# Patient Record
Sex: FEMALE | Race: WHITE | NOT HISPANIC OR LATINO | Employment: OTHER | URBAN - METROPOLITAN AREA
[De-identification: names, ages, dates, MRNs, and addresses within clinical notes are randomized per-mention and may not be internally consistent; named-entity substitution may affect disease eponyms.]

---

## 2017-02-14 ENCOUNTER — GENERIC CONVERSION - ENCOUNTER (OUTPATIENT)
Dept: OTHER | Facility: OTHER | Age: 67
End: 2017-02-14

## 2017-02-24 ENCOUNTER — GENERIC CONVERSION - ENCOUNTER (OUTPATIENT)
Dept: OTHER | Facility: OTHER | Age: 67
End: 2017-02-24

## 2017-03-09 ENCOUNTER — ALLSCRIPTS OFFICE VISIT (OUTPATIENT)
Dept: OTHER | Facility: OTHER | Age: 67
End: 2017-03-09

## 2017-03-09 DIAGNOSIS — R03.0 ELEVATED BLOOD PRESSURE READING WITHOUT DIAGNOSIS OF HYPERTENSION: ICD-10-CM

## 2017-03-09 DIAGNOSIS — I77.3 ARTERIAL FIBROMUSCULAR DYSPLASIA (HCC): ICD-10-CM

## 2017-03-09 LAB
BILIRUB UR QL STRIP: NEGATIVE
CLARITY UR: NORMAL
COLOR UR: YELLOW
GLUCOSE (HISTORICAL): NEGATIVE
HGB UR QL STRIP.AUTO: NEGATIVE
KETONES UR STRIP-MCNC: NEGATIVE MG/DL
LEUKOCYTE ESTERASE UR QL STRIP: NEGATIVE
NITRITE UR QL STRIP: NEGATIVE
PH UR STRIP.AUTO: 5 [PH]
PROT UR STRIP-MCNC: NEGATIVE MG/DL
SP GR UR STRIP.AUTO: 1.01
UROBILINOGEN UR QL STRIP.AUTO: 0.2

## 2017-03-15 ENCOUNTER — TRANSCRIBE ORDERS (OUTPATIENT)
Dept: LAB | Facility: HOSPITAL | Age: 67
End: 2017-03-15

## 2017-03-15 ENCOUNTER — HOSPITAL ENCOUNTER (OUTPATIENT)
Dept: RADIOLOGY | Facility: HOSPITAL | Age: 67
Discharge: HOME | End: 2017-03-15
Attending: INTERNAL MEDICINE
Payer: MEDICARE

## 2017-03-15 ENCOUNTER — APPOINTMENT (OUTPATIENT)
Dept: LAB | Facility: HOSPITAL | Age: 67
End: 2017-03-15
Attending: INTERNAL MEDICINE
Payer: MEDICARE

## 2017-03-15 DIAGNOSIS — I77.3 ARTERIAL FIBROMUSCULAR DYSPLASIA (HCC): ICD-10-CM

## 2017-03-15 DIAGNOSIS — I77.3 ARTERIAL FIBROMUSCULAR DYSPLASIA (HCC): Primary | ICD-10-CM

## 2017-03-15 PROCEDURE — 86235 NUCLEAR ANTIGEN ANTIBODY: CPT

## 2017-03-15 PROCEDURE — 76770 US EXAM ABDO BACK WALL COMP: CPT

## 2017-03-15 PROCEDURE — 82088 ASSAY OF ALDOSTERONE: CPT

## 2017-03-15 PROCEDURE — 36415 COLL VENOUS BLD VENIPUNCTURE: CPT

## 2017-03-17 LAB
ENA SS-A AB SER-ACNC: <0.2 AI (ref 0–0.9)
ENA SS-B AB SER-ACNC: <0.2 AI (ref 0–0.9)

## 2017-03-19 LAB — ALDOST SERPL-MCNC: 2.6 NG/DL (ref 0–30)

## 2017-05-08 ENCOUNTER — ALLSCRIPTS OFFICE VISIT (OUTPATIENT)
Dept: OTHER | Facility: OTHER | Age: 67
End: 2017-05-08

## 2017-05-23 ENCOUNTER — GENERIC CONVERSION - ENCOUNTER (OUTPATIENT)
Dept: OTHER | Facility: OTHER | Age: 67
End: 2017-05-23

## 2017-06-05 ENCOUNTER — ALLSCRIPTS OFFICE VISIT (OUTPATIENT)
Dept: OTHER | Facility: OTHER | Age: 67
End: 2017-06-05

## 2018-01-10 NOTE — PROGRESS NOTES
Assessment    1  Fibromuscular dysplasia (447 8) (I77 3)   2  Blood pressure elevated without history of HTN (796 2) (R03 0)   3  Collagenous colitis (558 9) (K52 831)    Plan  Blood pressure elevated without history of HTN    · (1) CORTISOL AM SPECIMEN; Status:Active; Requested QBU:[id number];    Perform:[facility]; [id number]; Ordered; For:Blood pressure elevated without history of HTN; Ordered By:Bubba Eason;  Blood pressure elevated without history of HTN, Fibromuscular dysplasia    · Ambulatory Blood Pressure Monitoring 24 hours; Status:Active; Requested  [id number];    Perform: In Office; [id number]; Ordered; For:Blood pressure elevated without history of HTN, Fibromuscular dysplasia; Ordered By:Bubba Eason;   · Follow-up visit in 1 month Evaluation and Treatment  Follow-up  Status: Complete  Done:  [id number]   Ordered; For: Blood pressure elevated without history of HTN, Fibromuscular dysplasia; Ordered By: Mercedez An Performed:  Due: [id number]; Last Updated By: Alexandra Esquivel; 3/9/2017 2:53:42 PM  Fibromuscular dysplasia    · (1) METANEPHRINE, PLASMA; Status:Active; Requested KJH:[id number];    Perform:[facility]; HGX:[id number]; Ordered; For:Fibromuscular dysplasia; Ordered By:Bubba Eason;   · (Q) ALDOSTERONE/PLASMA RENIN ACTIVITY RATIO,LC/MS/MS; Status:Active; Requested GXR:[id number];    Perform:[facility]; NINA:[id number]; Ordered; For:Fibromuscular dysplasia; Ordered By:Bubba Eason;   · (Q) SJOGRENS ANTIBODIES (SS-A,SS-B); Status:Active; Requested SJW:[id number];    Perform:[facility]; RAMOS:[id number]; Ordered; For:Fibromuscular dysplasia; Ordered By:Bubba Eason;   · US RETROPERITONEAL COMPLETE; Status:Active; Requested GUV:[id number];    Perform:[facility]; MWJ:[id number]; Last Updated By:Liv Zapata; 3/9/2017 2:53:42 PM;Ordered; For:Fibromuscular dysplasia;  Ordered By:Bubba Eason;  PMH: FMD (facioscapulohumeral muscular dystrophy)    · Urine Dip Non-Automated- POC; Status:Resulted - Requires Verification,Retrospective  By Protocol Authorization;   Done: [id number] 01:40PM   Performed: In Office; 21 [phone]; Last Updated By:Cristy Cano; 3/9/2017 1:40:27 PM;Ordered; [address]: FMD (facioscapulohumeral muscular dystrophy); Ordered By:Bubba Eason;    Discussion/Summary    58-year-old female with a history of right renal fibromuscular dysplasia that was diagnosed via CT angiography 6 years ago presents for evaluation of a fluctuating blood pressure  She has no chronic kidney disease her creatinine is 0 66  She has no proteinuria or hematuria on urinalysis      At this point I recommend the following:    #1 As of now her fibromuscular dysplasia is not being treated as her blood pressures have been stable  And continue to hold blood pressure medications  #2 we'll check a renin, aldosterone, a m  cortisol, and plasma metanephrine to rule out other secondary causes of hypertension or fluctuating blood pressures  #3 check a renal ultrasound to look for any changes in size of her right kidney related to her FMD  #4 approximately 2 weeks after her uterine prolapse surgery we'll do a 24-hour ambulatory blood pressure monitor  She states that her blood pressures are elevated at home but are normal in the office  Could be a sign of masked hypertension     We will make further recommendations once the above is complete  Follow-up in one month    Sincerely,    Dallas GALLARDO  [facility]phrology  Reason For Visit  New patient consultation for history of fibromuscular dysplasia and hypertension      History of Present Illness  This is a 58-year-old with an extensive history:    She has a history of a collagen vascular disease undifferentiated he said a history of what appeared to be eating erythema nodosum, but has had negative serologies  Her daughter has lupus  She polycystic kidney disease in her family her  and her son are both affected   She is being evaluated for a transplant donor patient 6 years ago and subsequently was found to have a right fibromuscular dysplasia  This precluded her from being a donor  Her blood pressures have been relatively stable up until recently  She has noticed increased palpitations and blood pressures in the 127M-404 systolic at home and it fluctuates down to 903-482 systolic currently is not on any blood pressure medications  She has had increased dry eyes and arthritis as well currently does not see a rheumatologist     She also is at increased stress related to her job  She did have a 24-hour Holter and cardiology consultation with an echocardiogram which also seemed to be stable  Is a history of a mother who was hospitalized with fluctuating blood pressures and she said had an adrenal neoplasm       Review of Systems    Constitutional: no fever and no chills  Integumentary: no rashes  Gastrointestinal: no abdominal pain, no nausea, no diarrhea and no vomiting  Respiratory: shortness of breath  Cardiovascular: no orthopnea, no PND, no chest pain and no lower extremity edema  Musculoskeletal: joint pain, but no joint swelling  Neurological: no headache, no lightheadedness and no dizziness  Genitourinary: no dysuria  12 point review of systems was negative besides those mentioned above     ROS reviewed  Past Medical History    The active problems and past medical history were reviewed and updated today  Surgical History    The surgical history was reviewed and updated today  Family History    The family history was reviewed and updated today  Social History  The social history was reviewed and updated today  The social history was reviewed and is unchanged  Current Meds   1  Allegra Allergy TABS; TAKE 1 TABLET DAILY; Therapy: (Recorded:09Mar2017) to Recorded   2  Effexor XR 37 5 MG Oral Capsule Extended Release 24 Hour; TAKE 1 CAPSULE ONCE   DAILY WITH FOOD;    Therapy: (Recorded:09Mar2017) to Recorded   3  Olopatadine HCl - 0 6 % Nasal Solution; as needed; Therapy: [id number] to Recorded    The medication list was reviewed and updated today  Allergies    1  Relafen TABS   2  Sulfa Drugs    3  Other    Vitals  Vital Signs    ** Printed in Appendix #1 below  Physical Exam    Constitutional: General appearance: No acute distress, well appearing and well nourished  ENT: External ears and nose appear normal      Eyes: Anicteric sclerae  Neck: No bruit heard over either carotid  JVD:  No JVD present  Pulmonary: Respiratory effort: No increased work of breathing or signs of respiratory distress  Auscultation of lungs: Clear to auscultation  Cardiovascular: Auscultation of heart: Normal rate and rhythm, normal S1 and S2, without murmurs  Abdomen: Non-tender, no masses  Extremities: No cyanosis, clubbing or edema  Pulses: Dorsalis Pedis and Posterior Tibial pulses normal    Rash: No rash present  Neurologic: Non Focal      Psychiatric: Orientation to person, place, and time: Normal   and Mood and affect: Normal     Back: No CVA tenderness        Results/Data  Urine Dip Non-Automated- POC [id number] 01:40PM Shultz Flattery     Test Name Result Flag Reference   Color Yellow     Clarity Transparent     Leukocytes NEGATIVE     Nitrite NEGATIVE     Blood NEGATIVE     Bilirubin NEGATIVE     Urobilinogen 0 2     Protein NEGATIVE     Ph 5 0     Specific Gravity 1 010     Ketone NEGATIVE     Glucose NEGATIVE     Color Yellow     Clarity Transparent     Leukocytes NEGATIVE     Nitrite NEGATIVE     Blood NEGATIVE     Bilirubin NEGATIVE     Urobilinogen 0 2     Protein NEGATIVE     Ph 5 0     Specific Gravity 1 010     Ketone NEGATIVE     Glucose NEGATIVE               Future Appointments    Date/Time Provider Specialty Site   05/08/2017 11:00 AM Shultz Flattery, DO Nephrology [facility] 21     Signatures   Electronically signed by : Justin Livingston DO; Mar  9 2017  3:50PM EST                       (Author)    Appendix #1     Vital Signs   Patient: Julian Neff ; : 1950; MRN: [id number]      Recorded: 54TCJ8427 03:44PM Recorded: 37XIA7379 01:45PM   Heart Rate  76    Systolic 653  741, RUE, Sitting   Diastolic 88  80, RUE, Sitting   Height   5 ft 4 in   Weight   179 lb 2 08 oz   BMI Calculated   30 75   BSA Calculated   1 88

## 2018-01-12 VITALS
HEIGHT: 64 IN | HEART RATE: 64 BPM | WEIGHT: 175 LBS | SYSTOLIC BLOOD PRESSURE: 124 MMHG | RESPIRATION RATE: 16 BRPM | BODY MASS INDEX: 29.88 KG/M2 | DIASTOLIC BLOOD PRESSURE: 80 MMHG

## 2018-01-13 VITALS
BODY MASS INDEX: 30.58 KG/M2 | SYSTOLIC BLOOD PRESSURE: 122 MMHG | HEIGHT: 64 IN | DIASTOLIC BLOOD PRESSURE: 88 MMHG | WEIGHT: 179.13 LBS | HEART RATE: 76 BPM

## 2018-01-14 VITALS
HEIGHT: 64 IN | WEIGHT: 178.25 LBS | BODY MASS INDEX: 30.43 KG/M2 | HEART RATE: 70 BPM | SYSTOLIC BLOOD PRESSURE: 132 MMHG | DIASTOLIC BLOOD PRESSURE: 84 MMHG

## 2018-05-16 ENCOUNTER — EVALUATION (OUTPATIENT)
Dept: PHYSICAL THERAPY | Age: 68
End: 2018-05-16
Payer: MEDICARE

## 2018-05-16 DIAGNOSIS — R26.9 GAIT ABNORMALITY: Primary | ICD-10-CM

## 2018-05-16 PROBLEM — J45.909 ASTHMA: Status: ACTIVE | Noted: 2018-05-16

## 2018-05-16 PROBLEM — M81.0 OSTEOPOROSIS: Status: ACTIVE | Noted: 2018-05-16

## 2018-05-16 PROCEDURE — G8978 MOBILITY CURRENT STATUS: HCPCS | Performed by: PHYSICAL THERAPIST

## 2018-05-16 PROCEDURE — 97163 PT EVAL HIGH COMPLEX 45 MIN: CPT | Performed by: PHYSICAL THERAPIST

## 2018-05-16 PROCEDURE — G8979 MOBILITY GOAL STATUS: HCPCS | Performed by: PHYSICAL THERAPIST

## 2018-05-16 NOTE — PROGRESS NOTES
PT Evaluation     Today's date: 2018  Patient name: Maggie Torres  : 1950  MRN: [id number]  Referring provider: Rodolfo Colunga MD  Dx:   Encounter Diagnosis     ICD-10-CM    1  Gait abnormality R26 9                   Assessment  Impairments: abnormal gait, abnormal or restricted ROM, impaired physical strength, pain with function and safety issue    Assessment details: PT IE : 18  Patient noted her sit to stand transfers she has immediate low back pain aggravation and lob and balance deficits  Patient noted baker's cysts persist and she noted she still has bilateral knee pain and lumbar spine pain that limites mobility  Patient noted bilateral gastrocnemius muscle weakness  Plus, she noted she had distal bilateral le edema that was - for Doppler testing and she uses compression stockings  Patient noted lumbar spine pain limits prolonged standing based functional activities  Patient noted her original onset of low back pain was after severe sneeze  Understanding of Dx/Px/POC: good and excellent   Prognosis: good  Prognosis details: Patient is a 75y o  year old female seen for outpatient PT evaluation with mobility deficits due to gait dysfunction and lumbar spine region pain  Patient presents to PT IE with the following problems, concerns, deficits and impairments: lumbar spine and bilateral le pain, decreased lumbar spine range of motion, decreased bilateral LE strength, Gait and stair dysfunctions, decrease in balance status, functional limitations and decreased tolerance to activity    Patient would benefit from skilled PT services under the following PT treatment plan to address the above noted deficits: therapeutic exercises and activities to facilitate lumbar spine ROM and bilateral le ROM and strength, gait and stair training, balance and proprioception activities, DLS and abdominal strengthening, modalities, manual therapy techniques, mechanical traction, Instrument Aided STM techniques, Kinesio taping techniques and a hep  Thank you for the referral      Goals  Short Term goals - 4 weeks  1  Patient will be independent HEP  2   Patient will report a 25 - 50% decrease in pain complaints  3   Increase strength 1/2 grade  4   Increase ROM 5-10 degrees  Long Term goals - 8 weeks  1  Patient will report elimination of pain complaints  2   Patient will return to all work related activities without restriction  3   Patient will return to all recreational activities without restriction  4   ROM WFL  5   Strength 5/5   6   Patient will report transfers improved > 50 %  7  Patient will report prolonged walking and stair climbing improved > 50 %  8  Patient's self foto computerized functional index improved to > 62   9   Patient's balance scores with improve > 25 %  Plan  Patient would benefit from: skilled PT  Planned modality interventions: cryotherapy, TENS, thermotherapy: hydrocollator packs and unattended electrical stimulation  Planned therapy interventions: abdominal trunk stabilization, IADL retraining, joint mobilization, manual therapy, activity modification, ADL retraining, ADL training, balance/weight bearing training, balance, neuromuscular re-education, flexibility, patient education, postural training, self care, strengthening, stretching, therapeutic activities, therapeutic exercise, therapeutic training, transfer training, functional ROM exercises, gait training, graded activity, graded exercise, graded motor and home exercise program  Frequency: 2x week  Duration in weeks: 8  Treatment plan discussed with: patient        Subjective Evaluation    History of Present Illness  Mechanism of injury: PT IE : 5/16/18  Patient noted bilateral distal le edema and tenderness persists  Patient noted massage to bilateral gastrocnemius muscle region and distal le is pain aggravating  Patient noted she sees a nephrologist for kidney aneurism     Patient noted bilateral le and lumbar spine pain limits her mobility and limits her ability to exercise and she denies falls  Patient noted she is vitamin D deficit, Asthma, Colitis, likens planus, Osteoporosis, Seasonal Allergies, sulfa medication allergy, and surgical history of hysterectomy and tubal ligation  Pain  At best pain ratin  At worst pain rating: 10  Location: lumbar spine and bilateral knee  Patient Goals  Patient goals for therapy: decreased pain, improved balance, increased motion, increased strength and return to sport/leisure activities  Patient goal: Patient's goal:" to resume exercise and to walk better"  Objective     Active Range of Motion     Lumbar   Flexion: 100 degrees WFL  Extension: 16 degrees   Left lateral flexion: 20 degrees   Right lateral flexion: 20 degrees   Left rotation: 64 degrees   Right rotation: 62 degrees   Left Hip   Flexion: 105 degrees   Abduction: 26 degrees     Right Hip   Flexion: 100 degrees with pain  Abduction: 22 degrees   Left Knee   Flexion: 125 degrees   Extension: -5 degrees   Extensor la degrees     Right Knee   Flexion: 120 degrees   Extension: -4 degrees   Extensor la degrees   Left Ankle/Foot   Dorsiflexion (ke): 6 degrees   Plantar flexion: 35 degrees     Right Ankle/Foot   Dorsiflexion (ke): 8 degrees   Plantar flexion: 44 degrees     Additional Active Range of Motion Details  SLR on right at 60 degrees and left at 65 degrees  Strength/Myotome Testing     Left Hip   Planes of Motion   Flexion: 4  Extension: 4  Abduction: 4  Adduction: 4    Right Hip   Planes of Motion   Flexion: 4+  Extension: 4  Abduction: 4+  Adduction: 4+    Left Knee   Flexion: 4  Extension: 4    Right Knee   Flexion: 4  Extension: 4    Left Ankle/Foot   Dorsiflexion: 5  Plantar flexion: 5    Right Ankle/Foot   Dorsiflexion: 5  Plantar flexion: 5    Tests     Lumbar     Right   Positive crossed SLR       Ambulation     Ambulation: Level Surfaces   Ambulation without assistive device: independent    Additional Level Surfaces Ambulation Details  Patient ambulates with decrease in bilateral step length, slight weight shift to right with left stance phase and decrease in bilateral ankle PF with stance phase  Ambulation: Stairs   Ascend stairs: independent  Pattern: reciprocal  Railings: two rails  Descend stairs: independent  Pattern: reciprocal  Railings: two rails    Additional Stairs Ambulation Details  Patient presents with bilateral ue support and pulling up to perform stair ascent with decrease in unilateral le push off with ascent and lateral step down and decrease in pace with stair descent  Comments   Tug is at 11 56 seconds  m CTSIB: Eyes Open on Firm Surface with patient sway index at 1 12; Eyes Closed on Firm Surface with patient sway index at 1 33; Eyes Open on Foam Surface with patient sway index at 1 19; Eyes Closed on Foam Surface with patient sway index at 3 69 with 2 LOB  Flowsheet Rows      Most Recent Value   PT/OT G-Codes   Current Score  58   Projected Score  62   FOTO information reviewed  Yes   Assessment Type  Evaluation   G code set  Mobility: Walking & Moving Around   Mobility: Walking and Moving Around Current Status ()  CK   Mobility: Walking and Moving Around Goal Status ()  CJ          Precautions: Patient noted she is vitamin D deficit, Asthma, Colitis, likens planus, Osteoporosis, Seasonal Allergies, sulfa medication allergy, and surgical history of hysterectomy and tubal ligation          Daily Treatment Diary     Manual  5/16                                                                                 Exercise Diary  5/16            bike             Standing back bends with back against plinth             LAQ:B:             Hip flexion:B:             Ankle arom seated:B:             Hamstring stretch:B:             Piriformis stretch:B:             LTR:B:             DLS abdominal bracing             DLS abdominal bracing with hip flexion:B:             DLS abdominal bracing with slr flexion:B:             Opposite ue to le DLS:B:             bridges             Prone lying             Prone press ups             Mini squats with DLS             Lunges with DLS:B:             SLS:B:             Tandem stance:B:             slr x 3:B:             HR and TR:B:             Side stepping              Side stepping with squats             Biodex balance system: limits of stability                                           Modalities  5/16            TENS and MHP to lumbar spine prone PRN

## 2018-05-16 NOTE — LETTER
May 16, 2018    Thelma Valderrama MD  [address]    Patient: Maricarmen Arroyo   YOB: 1950   Date of Visit: 2018     Encounter Diagnosis     ICD-10-CM    1  Gait abnormality R26 9        Dear Dr Liat Ray:    Please review the attached Plan of Care from [facility] recent visit  Please verify that you agree therapy should continue by signing the attached document and sending it back to our office  If you have any questions or concerns, please don't hesitate to call  Sincerely,    Selin Gracia, PT      Referring Provider:      I certify that I have read the below Plan of Care and certify the need for these services furnished under this plan of treatment while under my care  Thelma Valderrama MD  [address]  VIA Facsimile: [phone]          PT Evaluation     Today's date: 2018  Patient name: Maricarmen Arroyo  : 1950  MRN: [id number]  Referring provider: Terri Barnhart MD  Dx:   Encounter Diagnosis     ICD-10-CM    1  Gait abnormality R26 9                   Assessment  Impairments: abnormal gait, abnormal or restricted ROM, impaired physical strength, pain with function and safety issue    Assessment details: PT IE : 18  Patient noted her sit to stand transfers she has immediate low back pain aggravation and lob and balance deficits  Patient noted baker's cysts persist and she noted she still has bilateral knee pain and lumbar spine pain that limites mobility  Patient noted bilateral gastrocnemius muscle weakness  Plus, she noted she had distal bilateral le edema that was - for Doppler testing and she uses compression stockings  Patient noted lumbar spine pain limits prolonged standing based functional activities  Patient noted her original onset of low back pain was after severe sneeze      Understanding of Dx/Px/POC: good and excellent Prognosis: good  Prognosis details: Patient is a 75y o  year old female seen for outpatient PT evaluation with mobility deficits due to gait dysfunction and lumbar spine region pain  Patient presents to PT IE with the following problems, concerns, deficits and impairments: lumbar spine and bilateral le pain, decreased lumbar spine range of motion, decreased bilateral LE strength, Gait and stair dysfunctions, decrease in balance status, functional limitations and decreased tolerance to activity  Patient would benefit from skilled PT services under the following PT treatment plan to address the above noted deficits: therapeutic exercises and activities to facilitate lumbar spine ROM and bilateral le ROM and strength, gait and stair training, balance and proprioception activities, DLS and abdominal strengthening, modalities, manual therapy techniques, mechanical traction, Instrument Aided STM techniques, Kinesio taping techniques and a hep  Thank you for the referral      Goals  Short Term goals - 4 weeks  1  Patient will be independent HEP  2   Patient will report a 25 - 50% decrease in pain complaints  3   Increase strength 1/2 grade  4   Increase ROM 5-10 degrees  Long Term goals - 8 weeks  1  Patient will report elimination of pain complaints  2   Patient will return to all work related activities without restriction  3   Patient will return to all recreational activities without restriction  4   ROM WFL  5   Strength 5/5   6   Patient will report transfers improved > 50 %  7  Patient will report prolonged walking and stair climbing improved > 50 %  8  Patient's self foto computerized functional index improved to > 62   9   Patient's balance scores with improve > 25 %      Plan  Patient would benefit from: skilled PT  Planned modality interventions: cryotherapy, TENS, thermotherapy: hydrocollator packs and unattended electrical stimulation  Planned therapy interventions: abdominal trunk stabilization, IADL retraining, joint mobilization, manual therapy, activity modification, ADL retraining, ADL training, balance/weight bearing training, balance, neuromuscular re-education, flexibility, patient education, postural training, self care, strengthening, stretching, therapeutic activities, therapeutic exercise, therapeutic training, transfer training, functional ROM exercises, gait training, graded activity, graded exercise, graded motor and home exercise program  Frequency: 2x week  Duration in weeks: 8  Treatment plan discussed with: patient        Subjective Evaluation    History of Present Illness  Mechanism of injury: PT IE : 18  Patient noted bilateral distal le edema and tenderness persists  Patient noted massage to bilateral gastrocnemius muscle region and distal le is pain aggravating  Patient noted she sees a nephrologist for kidney aneurism  Patient noted bilateral le and lumbar spine pain limits her mobility and limits her ability to exercise and she denies falls  Patient noted she is vitamin D deficit, Asthma, Colitis, likens planus, Osteoporosis, Seasonal Allergies, sulfa medication allergy, and surgical history of hysterectomy and tubal ligation  Pain  At best pain ratin  At worst pain rating: 10  Location: lumbar spine and bilateral knee  Patient Goals  Patient goals for therapy: decreased pain, improved balance, increased motion, increased strength and return to sport/leisure activities  Patient goal: Patient's goal:" to resume exercise and to walk better"          Objective     Active Range of Motion     Lumbar   Flexion: 100 degrees WFL  Extension: 16 degrees   Left lateral flexion: 20 degrees   Right lateral flexion: 20 degrees   Left rotation: 64 degrees   Right rotation: 62 degrees   Left Hip   Flexion: 105 degrees   Abduction: 26 degrees     Right Hip   Flexion: 100 degrees with pain  Abduction: 22 degrees   Left Knee   Flexion: 125 degrees   Extension: -5 degrees   Extensor la degrees     Right Knee   Flexion: 120 degrees   Extension: -4 degrees   Extensor la degrees   Left Ankle/Foot   Dorsiflexion (ke): 6 degrees   Plantar flexion: 35 degrees     Right Ankle/Foot   Dorsiflexion (ke): 8 degrees   Plantar flexion: 44 degrees     Additional Active Range of Motion Details  SLR on right at 60 degrees and left at 65 degrees  Strength/Myotome Testing     Left Hip   Planes of Motion   Flexion: 4  Extension: 4  Abduction: 4  Adduction: 4    Right Hip   Planes of Motion   Flexion: 4+  Extension: 4  Abduction: 4+  Adduction: 4+    Left Knee   Flexion: 4  Extension: 4    Right Knee   Flexion: 4  Extension: 4    Left Ankle/Foot   Dorsiflexion: 5  Plantar flexion: 5    Right Ankle/Foot   Dorsiflexion: 5  Plantar flexion: 5    Tests     Lumbar     Right   Positive crossed SLR  Ambulation     Ambulation: Level Surfaces   Ambulation without assistive device: independent    Additional Level Surfaces Ambulation Details  Patient ambulates with decrease in bilateral step length, slight weight shift to right with left stance phase and decrease in bilateral ankle PF with stance phase  Ambulation: Stairs   Ascend stairs: independent  Pattern: reciprocal  Railings: two rails  Descend stairs: independent  Pattern: reciprocal  Railings: two rails    Additional Stairs Ambulation Details  Patient presents with bilateral ue support and pulling up to perform stair ascent with decrease in unilateral le push off with ascent and lateral step down and decrease in pace with stair descent  Comments   Tug is at 11 56 seconds  m CTSIB: Eyes Open on Firm Surface with patient sway index at 1 12; Eyes Closed on Firm Surface with patient sway index at 1 33; Eyes Open on Foam Surface with patient sway index at 1 19; Eyes Closed on Foam Surface with patient sway index at 3 69 with 2 LOB           Flowsheet Rows      Most Recent Value   PT/OT G-Codes   Current Score  58   Projected Score 58   FOTO information reviewed  Yes   Assessment Type  Evaluation   G code set  Mobility: Walking & Moving Around   Mobility: Walking and Moving Around Current Status ()  CK   Mobility: Walking and Moving Around Goal Status ()  CJ          Precautions: Patient noted she is vitamin D deficit, Asthma, Colitis, likens planus, Osteoporosis, Seasonal Allergies, sulfa medication allergy, and surgical history of hysterectomy and tubal ligation          Daily Treatment Diary     Manual  5/16                                                                                 Exercise Diary  5/16            bike             Standing back bends with back against plinth             LAQ:B:             Hip flexion:B:             Ankle arom seated:B:             Hamstring stretch:B:             Piriformis stretch:B:             LTR:B:             DLS abdominal bracing             DLS abdominal bracing with hip flexion:B:             DLS abdominal bracing with slr flexion:B:             Opposite ue to le DLS:B:             bridges             Prone lying             Prone press ups             Mini squats with DLS             Lunges with DLS:B:             SLS:B:             Tandem stance:B:             slr x 3:B:             HR and TR:B:             Side stepping              Side stepping with squats             [employer] balance system: limits of stability                                           Modalities  5/16            TENS and MHP to lumbar spine prone PRN

## 2018-05-17 ENCOUNTER — OFFICE VISIT (OUTPATIENT)
Dept: PHYSICAL THERAPY | Age: 68
End: 2018-05-17
Payer: MEDICARE

## 2018-05-17 DIAGNOSIS — R26.9 GAIT ABNORMALITY: Primary | ICD-10-CM

## 2018-05-17 PROCEDURE — 97110 THERAPEUTIC EXERCISES: CPT

## 2018-05-17 PROCEDURE — 97014 ELECTRIC STIMULATION THERAPY: CPT

## 2018-05-17 NOTE — PROGRESS NOTES
Daily Note     Today's date: 2018  Patient name: Noris Dumont  : 1950  MRN: [id number]  Referring provider: Greg Arcos MD  Dx:   Encounter Diagnosis     ICD-10-CM    1  Gait abnormality R26 9                   Subjective: Pt reported  Soreness after yesterday's evaluation  Objective: See treatment diary below      Assessment: Pt was challenged with dynamic exercises due to B LE weakness  Plan: Continue as per plan of care        Precautions: Patient noted she is vitamin D deficit, Asthma, Colitis, likens planus, Osteoporosis, Seasonal Allergies, sulfa medication allergy, and surgical history of hysterectomy and tubal ligation          Daily Treatment Diary     Manual                                                                                   Exercise Diary              bike 10 min             Standing back bends with back against plinth 20x            LAQ:B: 20x 3sec             Hip flexion:B: 20x 3sec             Ankle arom seated:B:             Hamstring stretch:B: 20sec 5x             Piriformis stretch:B: 20sec 5x             LTR:B: 3sec 20x            DLS abdominal bracing NT             DLS abdominal bracing with hip flexion:B: NT             DLS abdominal bracing with slr flexion:B: NT            Opposite ue to le DLS:B: NT             bridges 20x             Prone lying NT             Prone press ups NT             Mini squats with DLS NT             Lunges with DLS:B: NT             SLS:B: NT             Tandem stance:B: NT            slr x 3:B: 20x             HR and TR:B: 20x             Backwards marching inside bars 6x            Side stepping  6x inside bars             Step up B 6" 20x            Side stepping with squats NT             [employer] balance system: limits of stability NT                                           Modalities              TENS and MHP to lumbar spine prone PRN 10 min

## 2018-05-21 ENCOUNTER — OFFICE VISIT (OUTPATIENT)
Dept: PHYSICAL THERAPY | Age: 68
End: 2018-05-21
Payer: MEDICARE

## 2018-05-21 DIAGNOSIS — R26.9 GAIT ABNORMALITY: Primary | ICD-10-CM

## 2018-05-21 PROCEDURE — 97112 NEUROMUSCULAR REEDUCATION: CPT

## 2018-05-21 PROCEDURE — 97116 GAIT TRAINING THERAPY: CPT

## 2018-05-21 NOTE — PROGRESS NOTES
Daily Note     Today's date: 2018  Patient name: Brianna Dorman  : 1950  MRN: [id number]  Referring provider: Edward Rielly MD  Dx:   Encounter Diagnosis     ICD-10-CM    1  Gait abnormality R26 9                   Subjective: Pt reported pain at L LE and baker's cyst area at L knee     Objective: See treatment diary below      Assessment: Challenged with dynamic exercises today  Plan: Continue as per plan of care        Precautions: Patient noted she is vitamin D deficit, Asthma, Colitis, likens planus, Osteoporosis, Seasonal Allergies, sulfa medication allergy, and surgical history of hysterectomy and tubal ligation          Daily Treatment Diary     Manual                                                                                   Exercise Diary             bike 10 min  10 min            Standing back bends with back against plinth 20x 20x 3sec            LAQ:B: 20x 3sec  30x 2sec 2#            Hip flexion:B: 20x 3sec  30x 2sec 2#            Ankle arom seated:B:  NT            Hamstring stretch:B: 20sec 5x  NT            Piriformis stretch:B: 20sec 5x  NT            LTR:B: 3sec 20x NT            DLS abdominal bracing NT  NT            DLS abdominal bracing with hip flexion:B: NT  NT            DLS abdominal bracing with slr flexion:B: NT NT            Opposite ue to le DLS:B: NT  NT            bridges 20x  NT            Prone lying NT  NT            Prone press ups NT  NT            Mini squats with DLS NT  92G            Lunges with DLS:B: NT  NT            SLS:B: NT  5sec 10x B            Tandem walk   6x across the BB on foam            Tandem stance:B: NT 5sec 10x            slr x 3:B: 20x  20x 2#n           HR and TR:B: 20x  30x            Backwards marching inside bars 6x NT            Side stepping  6x inside bars  NT            Step up B 6" 20x 20x 2#            Side stepping with squats NT  NT            [employer] balance system: limits of stability NT  NT [employer] balance walk forw/back   S/L alt sides   3x each way                             Modalities  5/16 5/21           TENS and MHP to lumbar spine prone PRN 10 min  NT

## 2018-05-23 ENCOUNTER — OFFICE VISIT (OUTPATIENT)
Dept: PHYSICAL THERAPY | Age: 68
End: 2018-05-23
Payer: MEDICARE

## 2018-05-23 DIAGNOSIS — R26.9 GAIT ABNORMALITY: Primary | ICD-10-CM

## 2018-05-23 PROCEDURE — 97110 THERAPEUTIC EXERCISES: CPT

## 2018-05-23 PROCEDURE — 97112 NEUROMUSCULAR REEDUCATION: CPT

## 2018-05-23 NOTE — PROGRESS NOTES
Daily Note     Today's date: 2018  Patient name: Saqib Ortega  : 1950  MRN: [id number]  Referring provider: Terrell Brizuela MD  Dx:   Encounter Diagnosis     ICD-10-CM    1  Gait abnormality R26 9                   Subjective: Pt reported improved tolerance to HEP "I got a script to be assessed for the swelling at my L LE  I am travelling to [facility] on  I am hoping to get custom fit garments"     Objective: See treatment diary below      Assessment: Pt presents with poor balance at L LE with dynamic exercises  Edema at medial L knee and L ankle continues  1:1 Time cedric Huber      Plan: Continue as per plan of care        Precautions: Patient noted she is vitamin D deficit, Asthma, Colitis, likens planus, Osteoporosis, Seasonal Allergies, sulfa medication allergy, and surgical history of hysterectomy and tubal ligation          Daily Treatment Diary     Manual                                                                                   Exercise Diary            bike 10 min  10 min  10 min           Standing back bends with back against plinth 20x 20x 3sec  20x 3sec           LAQ:B: 20x 3sec  30x 2sec 2#  30x  2 5#           Hip flexion:B: 20x 3sec  30x 2sec 2#  30x 2 5#          Ankle arom seated:B:  NT  NT           Hamstring stretch:B: 20sec 5x  NT  20 sec 5x           Piriformis stretch:B: 20sec 5x  NT  20sec 5x           LTR:B: 3sec 20x NT  NT           DLS abdominal bracing NT  NT  NT           DLS abdominal bracing with hip flexion:B: NT  NT  NT           DLS abdominal bracing with slr flexion:B: NT NT  30x 2 5#           Opposite ue to le DLS:B: NT  NT  NT           bridges 20x  NT  30x 3sec           Prone lying NT  NT  NT           Prone press ups NT  NT  NT           Mini squats with DLS NT  72I  33S           Lunges with DLS:B: NT  NT  NT            SLS:B: NT  5sec 10x B  5sec 10x           Tandem walk   6x across the BB on foam  6x across BBon foam Tandem stance:B: NT 5sec 10x  NT           slr x 3:B: 20x  20x 2#n 30x  2 5#           HR and TR:B: 20x  30x  30x           Backwards marching inside bars 6x NT  NT           Side stepping  6x inside bars  NT  4x 25'          Step up B 6" 20x 20x 2#  NT           Side stepping with squats NT  NT  4x 25'          [employer] balance system: limits of stability NT  NT  NT           [employer] balance walk forw/back   S/L alt sides   3x each way  NT                            Modalities  5/16 5/21 5/23          TENS and MHP to lumbar spine prone PRN 10 min  NT  NT

## 2018-05-24 ENCOUNTER — EVALUATION (OUTPATIENT)
Dept: PHYSICAL THERAPY | Age: 68
End: 2018-05-24
Payer: MEDICARE

## 2018-05-24 DIAGNOSIS — I89.0 LYMPHEDEMA: Primary | ICD-10-CM

## 2018-05-24 PROCEDURE — 97163 PT EVAL HIGH COMPLEX 45 MIN: CPT

## 2018-05-24 PROCEDURE — G8979 MOBILITY GOAL STATUS: HCPCS

## 2018-05-24 PROCEDURE — G8978 MOBILITY CURRENT STATUS: HCPCS

## 2018-05-24 NOTE — LETTER
May 29, 2018    Cinthia Welch MD  [address] [place] [address]    Patient: Crista Kenny   YOB: 1950   Date of Visit: 2018     Encounter Diagnosis     ICD-10-CM    1  Lymphedema I89 0        Dear Dr Jose Botello:    Please review the attached Plan of Care from [facility] recent visit  Please verify that you agree therapy should continue by signing the attached document and sending it back to our office  If you have any questions or concerns, please don't hesitate to call  Sincerely,    Zohra Bell, PT      Referring Provider:      I certify that I have read the below Plan of Care and certify the need for these services furnished under this plan of treatment while under my care  Cinthia Welch MD  [address] [place] [address]  VIA Facsimile: [phone]          PT Evaluation     Today's date: 2018  Patient name: Crista Kenny  : 1950  MRN: [id number]  Referring provider: Adrian Burgos MD  Dx:   Encounter Diagnosis     ICD-10-CM    1  Lymphedema I89 0                   Assessment  Impairments: abnormal or restricted ROM, activity intolerance, impaired physical strength, lacks appropriate home exercise program and pain with function  Other impairment: hx of chronic low back pain, bilateral knee pain and achilles tenderness - being seen also by ORTho PT for these conditions   Functional limitations: decreased tolerance to bending, squatting, prolonged standing and walking due to heaviness of limbs as day progresses,    Assessment details: The pt is a 75y o  year old  Female referred to outpt Physical therapy with diagnoses left le lymphedema with onset of symptoms noted shortly after undergoing pelvic floor surgery in 2017 with pt approx 6 weeks post op then noticing increased left le edema  The pt reported worsening edema since  with occasional right le edema now also noted  Nicho Colon presents with decreased range of motion,increased pain, increased girth , + tissue fibrosis and decreased tolerance to functional activity  PT is warranted to address these deficits in efforts to reduce girth, maximize function, and return to prior level of activity  Treatment shall include complex decongestive physical therapy, manual lymphatic drainage massage and soft tissue mobilization, there exer to increase lymphatic circulation, home exer programming, lymphedema education and skin care management, compression wrapping , rom exer, trial home compression pump usage with le elevation 40 MMHG, fitting for compression garments knee high versus thigh high (  4 refills ) 20-30 MMHG , trial aquatic PT with use of hydrostatic pressure to increase lymphaticdrainage  The pt's autoimmune history may impact left le girth reduction due to hx of internal scarring noted  PT will consistently monitor girth reduction  Understanding of Dx/Px/POC: good   Prognosis: good    Goals  STG 1  Independent in there exer program in two weeks           2  Reduction of girth by 2 cm in two weeks      LTG 1 Reduction of girth by 4 cm in 4 weeks  2 The pt shall be independent in donning and doffing compression garments  Plan  Patient would benefit from: PT eval, skilled physical therapy and lymphedema eval  Referral necessary: Yes  Planned therapy interventions: aquatic therapy, manual therapy, massage, neuromuscular re-education, muscle pump exercises, therapeutic activities, therapeutic exercise, stretching, strengthening, home exercise program, compression and patient education  Other planned therapy interventions: home compression pump 40 mmHG with bilateral le elevation   Frequency: 2x week  Duration in weeks: 8  Treatment plan discussed with: patient        Subjective Evaluation    History of Present Illness  Onset date: Jan 2018 exacerbation    Date of surgery: March 2017 s/p pelvic floor surgery then left le edema noted 6 weeks post   Mechanism of injury: The pt is a 75year old female referred to outpt PT with exacerbation of left le lymphedema noted in  and original onset of left le edema noted after undergoing in 2017 pelvic floor surgery for bladder lift with pt noting 6 weeks post surgery increasing left le edema and atrophy of the left le  The pt has a significant pmh including fibromuscular dysplagia, right  Renal aneurysm, s/p hysterecomty age 42 and laporoscopic surgery prior to this , + endometriosis , IBS, cologenous colitis with recent exacerbation in Dec of 2017 reported, hx of lumbar bulging discs, chronic low back pain, bilateral knee pain and newest c/o bilateral achilles tendon pain  The pt is being seen by Ortho PT at this same facility with lymphedema specialist to address edema problems only at this time  She also presents with bilateral Baker's cysts and c/o left le muscle atrophy  She reports swelling increases with prolonged sitting and standing and as the day progresses    Recurrent probem    Quality of life: good    Pain  Current pain ratin  At best pain ratin  At worst pain ratin  Location: left le , bilateral knees and low back  Quality: squeezing, tight and dull ache  Relieving factors: rest  Aggravating factors: sitting, stair climbing, walking and standing  Progression: worsening    Social Support  Steps to enter house: yes  Stairs in house: yes   Lives in: multiple-level home  Lives with: spouse    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)  Exercise history: does like to walk mostly sedentary now due to pain  Life stress: high due to work and 's health    Treatments  No previous or current treatments  Current treatment: physical therapy  Patient Goals  Patient goals for therapy: decreased pain, decreased edema and increased motion  Patient goal: to reduce swelling in the left leg and increase tolerance to activity        Objective     Active Range of Motion   Left Ankle/Foot   Dorsiflexion (ke): 15 degrees     Right Ankle/Foot   Dorsiflexion (ke): 15 degrees     Additional Active Range of Motion Details  Left slr 0-70   Right 0-75, mod quad tightness bilaterally ,, c/o left calf pain,  Myofascial ropelike pain noted bilateral ITB and ant thighs, bilateral knee pain noted c/o achilles tendonitis pain, decreased proximal hip muscle usage with gait noted     Goal 0-20    Strength/Myotome Testing     Left Hip   Planes of Motion   Flexion: 4  Extension: 3+  Abduction: 4  Adduction: 3+    Right Hip   Planes of Motion   Flexion: 4  Extension: 3+  Abduction: 4  Adduction: 3+    Ambulation     Ambulation: Level Surfaces   Ambulation without assistive device: independent    Additional Level Surfaces Ambulation Details  Decreased tolerance to bending, squatting, prolonged standing or sitting due to increased edema both le's ,  +2 left calf noted       Flowsheet Rows      Most Recent Value   PT/OT G-Codes   Current Score  49 6   FOTO information reviewed  Yes   Assessment Type  Evaluation   G code set  Mobility: Walking & Moving Around   Mobility: Walking and Moving Around Current Status ()  CK   Mobility: Walking and Moving Around Goal Status ()  CJ            5/24/18 Left  Right  reev left  Right     mtp 21 5 22      Lat malleolus 23 22 5      4cm prox to lat mal 20 5 20      8 22 5 22      12 28 27 5      16 32 32      20 35 5 35      24 37 36      28 38 37      32 38 37      36 39 38 5      40 42 5 44      44 43 5 44 5      48 46 48      52 50 50      56 54 54cm                          Precautions: PMH asthma, osteoporosis, seasonal allergies, back pain, acid reflux, sleep dysfunction, anxiety,     Daily Treatment Diary     Manual  5/24/18            Mld massage and soft tissue mob left le greater than right I eval            graston to left calf             Fit for compression thigh high measured today Exercise Diary  5/24            Nu step/lifecycle             Lymph le exer packet                                                                                                                                                                                                                                                           Modalities  5/24            Trial right le compression pump 40 mmHG with elevation of right le

## 2018-05-26 NOTE — PROGRESS NOTES
PT Evaluation     Today's date: 2018  Patient name: Viky Leung  : 1950  MRN: [id number]  Referring provider: Deysi Malcolm MD  Dx:   Encounter Diagnosis     ICD-10-CM    1  Lymphedema I89 0                   Assessment  Impairments: abnormal or restricted ROM, activity intolerance, impaired physical strength, lacks appropriate home exercise program and pain with function  Other impairment: hx of chronic low back pain, bilateral knee pain and achilles tenderness - being seen also by ORTho PT for these conditions   Functional limitations: decreased tolerance to bending, squatting, prolonged standing and walking due to heaviness of limbs as day progresses,    Assessment details: The pt is a 75y o  year old  Female referred to outpt Physical therapy with diagnoses left le lymphedema with onset of symptoms noted shortly after undergoing pelvic floor surgery in 2017 with pt approx 6 weeks post op then noticing increased left le edema  The pt reported worsening edema since  with occasional right le edema now also noted  Viky Leung presents with decreased range of motion,increased pain, increased girth , + tissue fibrosis and decreased tolerance to functional activity  PT is warranted to address these deficits in efforts to reduce girth, maximize function, and return to prior level of activity  Treatment shall include complex decongestive physical therapy, manual lymphatic drainage massage and soft tissue mobilization, there exer to increase lymphatic circulation, home exer programming, lymphedema education and skin care management, compression wrapping , rom exer, trial home compression pump usage with le elevation 40 MMHG, fitting for compression garments knee high versus thigh high (  4 refills ) 20-30 MMHG , trial aquatic PT with use of hydrostatic pressure to increase lymphaticdrainage   The pt's autoimmune history may impact left le girth reduction due to hx of internal scarring noted  PT will consistently monitor girth reduction  Understanding of Dx/Px/POC: good   Prognosis: good    Goals  STG 1  Independent in there exer program in two weeks           2  Reduction of girth by 2 cm in two weeks      LTG 1 Reduction of girth by 4 cm in 4 weeks  2 The pt shall be independent in donning and doffing compression garments  Plan  Patient would benefit from: PT eval, skilled physical therapy and lymphedema eval  Referral necessary: Yes  Planned therapy interventions: aquatic therapy, manual therapy, massage, neuromuscular re-education, muscle pump exercises, therapeutic activities, therapeutic exercise, stretching, strengthening, home exercise program, compression and patient education  Other planned therapy interventions: home compression pump 40 mmHG with bilateral le elevation   Frequency: 2x week  Duration in weeks: 8  Treatment plan discussed with: patient        Subjective Evaluation    History of Present Illness  Onset date: Jan 2018 exacerbation  Date of surgery: March 2017 s/p pelvic floor surgery then left le edema noted 6 weeks post   Mechanism of injury: The pt is a 75year old female referred to outpt PT with exacerbation of left le lymphedema noted in Jan of 2018 and original onset of left le edema noted after undergoing in March of 2017 pelvic floor surgery for bladder lift with pt noting 6 weeks post surgery increasing left le edema and atrophy of the left le  The pt has a significant pmh including fibromuscular dysplagia, right  Renal aneurysm, s/p hysterecomty age 42 and laporoscopic surgery prior to this , + endometriosis , IBS, cologenous colitis with recent exacerbation in Dec of 2017 reported, hx of lumbar bulging discs, chronic low back pain, bilateral knee pain and newest c/o bilateral achilles tendon pain  The pt is being seen by Ortho PT at this same facility with lymphedema specialist to address edema problems only at this time   She also presents with bilateral Baker's cysts and c/o left le muscle atrophy  She reports swelling increases with prolonged sitting and standing and as the day progresses  Recurrent probem    Quality of life: good    Pain  Current pain ratin  At best pain ratin  At worst pain ratin  Location: left le , bilateral knees and low back  Quality: squeezing, tight and dull ache  Relieving factors: rest  Aggravating factors: sitting, stair climbing, walking and standing  Progression: worsening    Social Support  Steps to enter house: yes  Stairs in house: yes   Lives in: multiple-level home  Lives with: spouse    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[employer])  Exercise history: does like to walk mostly sedentary now due to pain  Life stress: high due to work and 's health    Treatments  No previous or current treatments  Current treatment: physical therapy  Patient Goals  Patient goals for therapy: decreased pain, decreased edema and increased motion  Patient goal: to reduce swelling in the left leg and increase tolerance to activity        Objective     Active Range of Motion   Left Ankle/Foot   Dorsiflexion (ke): 15 degrees     Right Ankle/Foot   Dorsiflexion (ke): 15 degrees     Additional Active Range of Motion Details  Left slr 0-70   Right 0-75, mod quad tightness bilaterally ,, c/o left calf pain,  Myofascial ropelike pain noted bilateral ITB and ant thighs, bilateral knee pain noted c/o achilles tendonitis pain, decreased proximal hip muscle usage with gait noted     Goal 0-20    Strength/Myotome Testing     Left Hip   Planes of Motion   Flexion: 4  Extension: 3+  Abduction: 4  Adduction: 3+    Right Hip   Planes of Motion   Flexion: 4  Extension: 3+  Abduction: 4  Adduction: 3+    Ambulation     Ambulation: Level Surfaces   Ambulation without assistive device: independent    Additional Level Surfaces Ambulation Details  Decreased tolerance to bending, squatting, prolonged standing or sitting due to increased edema both le's ,  +2 left calf noted       Flowsheet Rows      Most Recent Value   PT/OT G-Codes   Current Score  49 6   FOTO information reviewed  Yes   Assessment Type  Evaluation   G code set  Mobility: Walking & Moving Around   Mobility: Walking and Moving Around Current Status ()  CK   Mobility: Walking and Moving Around Goal Status ()  CJ            5/24/18 Left  Right  reev left  Right     mtp 21 5 22      Lat malleolus 23 22 5      4cm prox to lat mal 20 5 20      8 22 5 22      12 28 27 5      16 32 32      20 35 5 35      24 37 36      28 38 37      32 38 37      36 39 38 5      40 42 5 44      44 43 5 44 5      48 46 48      52 50 50      56 54 54cm                          Precautions: PMH asthma, osteoporosis, seasonal allergies, back pain, acid reflux, sleep dysfunction, anxiety,     Daily Treatment Diary     Manual  5/24/18            Mld massage and soft tissue mob left le greater than right I eval            graston to left calf             Fit for compression thigh high measured today                                          Exercise Diary  5/24            Nu step/lifecycle             Lymph le exer packet                                                                                                                                                                                                                                                           Modalities  5/24            Trial right le compression pump 40 mmHG with elevation of right le

## 2018-05-29 ENCOUNTER — OFFICE VISIT (OUTPATIENT)
Dept: PHYSICAL THERAPY | Age: 68
End: 2018-05-29
Payer: MEDICARE

## 2018-05-29 DIAGNOSIS — R26.9 GAIT ABNORMALITY: Primary | ICD-10-CM

## 2018-05-29 PROCEDURE — 97110 THERAPEUTIC EXERCISES: CPT

## 2018-05-29 PROCEDURE — 97014 ELECTRIC STIMULATION THERAPY: CPT

## 2018-05-29 NOTE — PROGRESS NOTES
Daily Note     Today's date: 2018  Patient name: Maggie Kidney  : 1950  MRN: [id number]  Referring provider: Rodolfo Colunga MD  Dx:   Encounter Diagnosis     ICD-10-CM    1  Gait abnormality R26 9                   Subjective: Pt reported pain at L knee over the weekend but improved today  Objective: See treatment diary below      Assessment: Pt challenged with dynamic exercises  1:1 time Aguilar Rohan Due PT      Plan: Continue as per plan of care        Precautions: Patient noted she is vitamin D deficit, Asthma, Colitis, likens planus, Osteoporosis, Seasonal Allergies, sulfa medication allergy, and surgical history of hysterectomy and tubal ligation          Daily Treatment Diary     Manual                                                                                   Exercise Diary           bike 10 min  10 min  10 min  10min          Standing back bends with back against plinth 20x 20x 3sec  20x 3sec  NT          LAQ:B: 20x 3sec  30x 2sec 2#  30x  2 5#  NT          Hip flexion:B: 20x 3sec  30x 2sec 2#  30x 2 5# NT          Ankle arom seated:B:  NT  NT  NT          Hamstring stretch:B: 20sec 5x  NT  20 sec 5x  NT          Piriformis stretch:B: 20sec 5x  NT  20sec 5x  NT          LTR:B: 3sec 20x NT  NT  NT          DLS abdominal bracing NT  NT  NT  NT          DLS abdominal bracing with hip flexion:B: NT  NT  NT  NT          DLS abdominal bracing with slr flexion:B: NT NT  30x 2 5#  NT          Opposite ue to le DLS:B: NT  NT  NT  NT          bridges 20x  NT  30x 3sec  NT          Prone lying NT  NT  NT  NT          Prone press ups NT  NT  NT  NT          Mini squats with DLS NT  56S  95Y  39F          Lunges with DLS:B: NT  NT  NT   35E          SLS:B: NT  5sec 10x B  5sec 10x  NT          Tandem walk   6x across the BB on foam  6x across BBon foam  NT          Tandem stance:B: NT 5sec 10x  NT  NTb         slr x 3:B: 20x  20x 2#n 30x  2 5#  NT          HR and TR:B: 20x  30x  30x  30x         Backwards marching inside bars 6x NT  NT  NT          opp hand/ opp knee     4x 25' 2#           Backwards     4x 25' 2#                       Side stepping  6x inside bars  NT  4x 25' 4x 25' 2#          Step up B 6" 20x 20x 2#  NT  NT         Side stepping with squats NT  NT  4x 25' 4x 25' 2#          [employer] balance system: limits of stability and  maze NT  NT  NT  5x each          Lifefitness balance walk forw/back   S/L alt sides   3x each way  NT  25# 4x each                           Modalities  5/16 5/21 5/23 5/29         TENS and MHP to lumbar spine prone PRN 10 min  NT  NT  15 min

## 2018-05-31 ENCOUNTER — OFFICE VISIT (OUTPATIENT)
Dept: PHYSICAL THERAPY | Age: 68
End: 2018-05-31
Payer: MEDICARE

## 2018-05-31 DIAGNOSIS — I89.0 LYMPHEDEMA: Primary | ICD-10-CM

## 2018-05-31 PROCEDURE — 97140 MANUAL THERAPY 1/> REGIONS: CPT

## 2018-05-31 PROCEDURE — 97110 THERAPEUTIC EXERCISES: CPT

## 2018-05-31 PROCEDURE — 97750 PHYSICAL PERFORMANCE TEST: CPT

## 2018-05-31 PROCEDURE — 97530 THERAPEUTIC ACTIVITIES: CPT

## 2018-06-01 NOTE — PROGRESS NOTES
Daily Note     Today's date: 2018  Patient name: Nicho Colon  : 1950  MRN: [id number]  Referring provider: Octavio Norman MD  Dx:   Encounter Diagnosis     ICD-10-CM    1  Lymphedema I89 0                   Subjective: pt today c/o right ant dorsal ankle pain  - pt with hx of myalgia   Pt directed to ortho therapist for stretching exer so current PT can focus on lymphedema care  Objective: See treatment diary below  Manual  18                   Mld massage and soft tissue mob left le greater than right I eval                     graston to left calf emphasis and right distal le    man                   Fit for compression thigh high measured today  perf                                                                          Exercise Diary                     /lifecycle man    15 min                    Lymph le exer packet    issued 20 reps each exer                                                                                                                                                                                                                                                                                                                                                                                                                                                                         Modalities                       Trial right le compression pump 40 mmHG with elevation of right le                                                                                 Assessment: Tolerated treatment well  Patient would benefit from continued PT  Pt issued there exer lymphedema packet, fair tolerance  To graston, pt with hx of myalgia PT to monitor tolerance to manual treatment  Pt measured for thigh high compression garments today  Add mld massage and soft tissue mobilization  Will trial compression pump also in the future        Plan: Progress treatment as tolerated

## 2018-06-04 ENCOUNTER — OFFICE VISIT (OUTPATIENT)
Dept: PHYSICAL THERAPY | Age: 68
End: 2018-06-04
Payer: MEDICARE

## 2018-06-04 DIAGNOSIS — I89.0 LYMPHEDEMA: Primary | ICD-10-CM

## 2018-06-04 DIAGNOSIS — R26.9 GAIT ABNORMALITY: ICD-10-CM

## 2018-06-04 PROCEDURE — 97014 ELECTRIC STIMULATION THERAPY: CPT

## 2018-06-04 PROCEDURE — 97110 THERAPEUTIC EXERCISES: CPT

## 2018-06-04 NOTE — PROGRESS NOTES
Daily Note     Today's date: 2018  Patient name: Carl Daniel  : 1950  MRN: [id number]  Referring provider: Jeramie Avina MD  Dx:   Encounter Diagnosis     ICD-10-CM    1  Lymphedema I89 0    2  Gait abnormality R26 9                   Subjective: "I am feeling good, I am getting ready for my [place] trip"     Objective: See treatment diary below      Assessment: R LB discomfort with exercises, resolved with Modalities  Plan: Continue as per plan of care        Precautions: Patient noted she is vitamin D deficit, Asthma, Colitis, likens planus, Osteoporosis, Seasonal Allergies, sulfa medication allergy, and surgical history of hysterectomy and tubal ligation          Daily Treatment Diary     Manual                                                                                   Exercise Diary          bike 10 min  10 min  10 min  10min  10 min         Standing back bends with back against plinth 20x 20x 3sec  20x 3sec  NT  20sec 5x         LAQ:B: 20x 3sec  30x 2sec 2#  30x  2 5#  NT  NT         Hip flexion:B: 20x 3sec  30x 2sec 2#  30x 2 5# NT  NT         Ankle arom seated:B:  NT  NT  NT  NT         Hamstring stretch:B: 20sec 5x  NT  20 sec 5x  NT  20sec 5x         Piriformis stretch:B: 20sec 5x  NT  20sec 5x  NT  20sec 5x         LTR:B: 3sec 20x NT  NT  NT  3sec 20x         DLS abdominal bracing NT  NT  NT  NT  NT         DLS abdominal bracing with hip flexion:B: NT  NT  NT  NT  NT         DLS abdominal bracing with slr flexion:B: NT NT  30x 2 5#  NT  30x 2 5#         Opposite ue to le DLS:B: NT  NT  NT  NT  NT         bridges 20x  NT  30x 3sec  NT  30x 3sec         Prone lying NT  NT  NT  NT  NT         Prone press ups NT  NT  NT  NT  NT         Mini squats with DLS NT  63V  23G  88Z  30x         Lunges with DLS:B: NT  NT  NT   86M  NT         SLS:B: NT  5sec 10x B  5sec 10x  NT  NT        Tandem walk   6x across the BB on foam  6x across BBon foam  NT  6x across         Tandem stance:B: NT 5sec 10x  NT  NT NT        slr x 3:B: 20x  20x 2#n 30x  2 5#  NT  30x 2 5#         HR and TR:B: 20x  30x  30x  30x 30x         Backwards marching inside bars 6x NT  NT  NT  4x 2 5#         opp hand/ opp knee     4x 25' 2#   4x 2 5#         Backwards     4x 25' 2#  4x 2 5#                      Side stepping  6x inside bars  NT  4x 25' 4x 25' 2#  4x 2 5#         Step up B 6" 20x 20x 2#  NT  NT NT         Side stepping with squats NT  NT  4x 25' 4x 25' 2#  4x 2 5#         [employer] balance system: limits of stability and  maze NT  NT  NT  5x each  5x each   Level 10        Lifefitness balance walk forw/back   S/L alt sides   3x each way  NT  25# 4x each  NT                          Modalities  5/16 5/21 5/23 5/29 6/4        TENS and MHP to lumbar spine prone PRN 10 min  NT  NT  15 min  10 min

## 2018-06-06 ENCOUNTER — OFFICE VISIT (OUTPATIENT)
Dept: PHYSICAL THERAPY | Age: 68
End: 2018-06-06
Payer: MEDICARE

## 2018-06-06 DIAGNOSIS — I89.0 LYMPHEDEMA: Primary | ICD-10-CM

## 2018-06-06 PROCEDURE — 97140 MANUAL THERAPY 1/> REGIONS: CPT

## 2018-06-06 PROCEDURE — 97110 THERAPEUTIC EXERCISES: CPT

## 2018-06-06 NOTE — PROGRESS NOTES
Daily Note     Today's date: 2018  Patient name: Anais Montes  : 1950  MRN: [id number]  Referring provider: Narendra Granado MD  Dx:   Encounter Diagnosis     ICD-10-CM    1  Lymphedema I89 0                   Subjective: Pt  Without additional complaints      Objective: See treatment diary below      Assessment: Tolerated treatment well  Patient would benefit from continued PT      Plan: Continue per plan of care        Objective: See treatment diary below  Manual  18                 Mld massage and soft tissue mob left le greater than right I eval                     graston to left calf emphasis and right distal le    man  man  40 min                 Fit for compression thigh high measured today  perf                                                                          Exercise Diary                   /lifecycle man    15 min   15 min                 Lymph le exer packet    issued 20 reps each exer  yes                                                                                                                                                                                                                                                                                                                                                                                                                                                                       Modalities                       Trial right le compression pump 40 mmHG with elevation of right le

## 2018-06-07 ENCOUNTER — APPOINTMENT (OUTPATIENT)
Dept: PHYSICAL THERAPY | Age: 68
End: 2018-06-07
Payer: MEDICARE

## 2018-06-08 ENCOUNTER — OFFICE VISIT (OUTPATIENT)
Dept: PHYSICAL THERAPY | Age: 68
End: 2018-06-08
Payer: MEDICARE

## 2018-06-08 DIAGNOSIS — R26.9 GAIT ABNORMALITY: ICD-10-CM

## 2018-06-08 DIAGNOSIS — I89.0 LYMPHEDEMA: Primary | ICD-10-CM

## 2018-06-08 PROCEDURE — 97110 THERAPEUTIC EXERCISES: CPT

## 2018-06-08 PROCEDURE — 97014 ELECTRIC STIMULATION THERAPY: CPT

## 2018-06-08 NOTE — PROGRESS NOTES
Daily Note     Today's date: 2018  Patient name: Saqib Ortega  : 1950  MRN: [id number]  Referring provider: Terrell Brizuela MD  Dx:   Encounter Diagnosis     ICD-10-CM    1  Lymphedema I89 0    2  Gait abnormality R26 9                   Subjective: "I am feeling so much better and stronger"    Objective: See treatment diary below      Assessment: Tolerated progression well  Plan: Continue as per plan of care        Precautions: Patient noted she is vitamin D deficit, Asthma, Colitis, likens planus, Osteoporosis, Seasonal Allergies, sulfa medication allergy, and surgical history of hysterectomy and tubal ligation          Daily Treatment Diary                                                                                        Exercise Diary         bike 10 min  10 min  10 min  10min  10 min  10 min        Standing back bends with back against plinth 20x 20x 3sec  20x 3sec  NT  20sec 5x  20x 3sec        LAQ:B: 20x 3sec  30x 2sec 2#  30x  2 5#  NT  NT  NT        Hip flexion:B: 20x 3sec  30x 2sec 2#  30x 2 5# NT  NT  NT        Ankle arom seated:B:  NT  NT  NT  NT  NT        Hamstring stretch:B: 20sec 5x  NT  20 sec 5x  NT  20sec 5x  NT        Piriformis stretch:B: 20sec 5x  NT  20sec 5x  NT  20sec 5x  NT        LTR:B: 3sec 20x NT  NT  NT  3sec 20x  NT        DLS abdominal bracing NT  NT  NT  NT  NT  NT        DLS abdominal bracing with hip flexion:B: NT  NT  NT  NT  NT  NT        DLS abdominal bracing with slr flexion:B: NT NT  30x 2 5#  NT  30x 2 5#  NT        Opposite ue to le DLS:B: NT  NT  NT  NT  NT  NT        bridges 20x  NT  30x 3sec  NT  30x 3sec  NT        Prone lying NT  NT  NT  NT  NT  NT        Prone press ups NT  NT  NT  NT  NT  NT        Mini squats with DLS NT  80R  35O  31T  30x  NT        Lunges with DLS:B: NT  NT  NT   76B  NT  NT        SLS:B: NT  5sec 10x B  5sec 10x  NT  NT NT        Tandem walk   6x across the BB on foam  6x across BBon foam  NT  6x across  6x 3#        Tandem stance:B: NT 5sec 10x  NT  NT NT 10sec 10x        slr x 3:B: 20x  20x 2#n 30x  2 5#  NT  30x 2 5#  3# 20x        HR and TR:B: 20x  30x  30x  30x 30x  30x       Backwards marching 25' 6x NT  NT  NT  4x 2 5#  2x3#       opp hand/ opp knee 25'    4x 25' 2#   4x 2 5#  2x 3#       Backwards 25'    4x 25' 2#  4x 2 5#  2x 3#                     Side stepping 25' 6x inside bars  NT  4x 25' 4x 25' 2#  4x 2 5#  2x 3#       Step up B 6" 20x 20x 2#  NT  NT NT  30x        Side stepping with squats 25' NT  NT  4x 25' 4x 25' 2#  4x 2 5#  2x 3#       [employer] balance system: limits of stability and  maze NT  NT  NT  5x each  5x each   Level 10 5x eachlevel 10        Lifefitness balance walk forw/back   S/L alt sides   3x each way  NT  25# 4x each  NT  NT        Life fitness curls      50# 20x        ext      15# 20x        Leg press       50# 20x        abd       40# 20x                        Modalities  5/16 5/21 5/23 5/29 6/4 6/8       TENS and MHP to lumbar spine prone PRN 10 min  NT  NT  15 min  10 min  10 min

## 2018-06-11 ENCOUNTER — OFFICE VISIT (OUTPATIENT)
Dept: PHYSICAL THERAPY | Age: 68
End: 2018-06-11
Payer: MEDICARE

## 2018-06-11 DIAGNOSIS — R26.9 GAIT ABNORMALITY: Primary | ICD-10-CM

## 2018-06-11 PROCEDURE — G8980 MOBILITY D/C STATUS: HCPCS | Performed by: PHYSICAL THERAPIST

## 2018-06-11 PROCEDURE — G8979 MOBILITY GOAL STATUS: HCPCS | Performed by: PHYSICAL THERAPIST

## 2018-06-11 PROCEDURE — 97112 NEUROMUSCULAR REEDUCATION: CPT | Performed by: PHYSICAL THERAPIST

## 2018-06-11 PROCEDURE — 97110 THERAPEUTIC EXERCISES: CPT | Performed by: PHYSICAL THERAPIST

## 2018-06-11 NOTE — PROGRESS NOTES
PT Evaluation / Reassessment / Discharge    Today's date: 2018  Patient name: Ya Christine  : 1950  MRN: [id number]  Referring provider: Sotero Ladd MD  Dx:   Encounter Diagnosis     ICD-10-CM    1  Gait abnormality R26 9                   Assessment  Impairments: abnormal gait, abnormal or restricted ROM, impaired physical strength, pain with function and safety issue    Assessment details: PT IE : 18  Patient noted her sit to stand transfers she has immediate low back pain aggravation and lob and balance deficits  Patient noted baker's cysts persist and she noted she still has bilateral knee pain and lumbar spine pain that limites mobility  Patient noted bilateral gastrocnemius muscle weakness  Plus, she noted she had distal bilateral le edema that was - for Doppler testing and she uses compression stockings  Patient noted lumbar spine pain limits prolonged standing based functional activities  Patient noted her original onset of low back pain was after severe sneeze  PT Reassessment 18:    Understanding of Dx/Px/POC: good and excellent   Prognosis: good  Prognosis details: Patient presents with the following progress since onset of PT: decrease in bilateral le MMT, gait and stair improvements, balance improved, transfer improvements and functional progress  Thus, pt agrees with myself in d/c to hep and continues with lymphedema therapist when she returns home from trip to [facility] later this week  Therefore, patient in agreement with my plan with current PT POC  Goals  Short Term goals - 4 weeks  1  Patient will be independent HEP   MET  2   Patient will report a 25 - 50% decrease in pain complaints  MET  3   Increase strength 1/2 grade  MET  4   Increase ROM 5-10 degrees  MET    Long Term goals - 8 weeks  1  Patient will report elimination of pain complaints  Partially MET  2   Patient will return to all work related activities without restriction  Partially MET  3   Patient will return to all recreational activities without restriction  Partially MET  4   ROM WFL  Partially MET  5   Strength 5/5  Partially MET  6   Patient will report transfers improved > 50 %  Partially MET  7   Patient will report prolonged walking and stair climbing improved > 50 %  Partially MET  8   Patient's self foto computerized functional index improved to > 62  Partially MET  9   Patient's balance scores with improve > 25 %  MET  Plan  Patient would benefit from: skilled PT  Planned modality interventions: cryotherapy, TENS, thermotherapy: hydrocollator packs and unattended electrical stimulation  Planned therapy interventions: abdominal trunk stabilization, IADL retraining, joint mobilization, manual therapy, activity modification, ADL retraining, ADL training, balance/weight bearing training, balance, neuromuscular re-education, flexibility, patient education, postural training, self care, strengthening, stretching, therapeutic activities, therapeutic exercise, therapeutic training, transfer training, functional ROM exercises, gait training, graded activity, graded exercise, graded motor and home exercise program  Frequency: 2x week  Duration in weeks: 8  Treatment plan discussed with: patient        Subjective Evaluation    History of Present Illness  Mechanism of injury: PT IE : 5/16/18  Patient noted bilateral distal le edema and tenderness persists  Patient noted massage to bilateral gastrocnemius muscle region and distal le is pain aggravating  Patient noted she sees a nephrologist for kidney aneurism  Patient noted bilateral le and lumbar spine pain limits her mobility and limits her ability to exercise and she denies falls  Patient noted she is vitamin D deficit, Asthma, Colitis, likens planus, Osteoporosis, Seasonal Allergies, sulfa medication allergy, and surgical history of hysterectomy and tubal ligation      PT Reassessment 6-11-18:  Patient reported the following progress since onset of PT: more steady on her feet, balance and endurance improved  Patient noted she no longer has to pull herself up the stairs with bilateral ue support as well as go down into kneeling is improved  Patient noted the following deficits still persist: kneeling to standing transfers that pt noted she requires bilateral ue support, squatting functional activities, and stairs remain difficult  Patient noted she still gets left gastrocnemius region cramping at night  Pain  At best pain rating: 3  At worst pain ratin  Location: lumbar spine and bilateral knee  Patient Goals  Patient goals for therapy: decreased pain, improved balance, increased motion, increased strength and return to sport/leisure activities  Patient goal: Patient's goal:" to resume exercise and to walk better"  Objective     Active Range of Motion     Lumbar   Flexion: 100 degrees WFL  Extension: 16 degrees   Left lateral flexion: 20 degrees   Right lateral flexion: 20 degrees   Left rotation: 64 degrees   Right rotation: 62 degrees   Left Hip   Flexion: 105 degrees   Abduction: 26 degrees     Right Hip   Flexion: 100 degrees with pain  Abduction: 22 degrees   Left Knee   Flexion: 125 degrees   Extension: -5 degrees   Extensor la degrees     Right Knee   Flexion: 120 degrees   Extension: -4 degrees   Extensor la degrees   Left Ankle/Foot   Dorsiflexion (ke): 6 degrees   Plantar flexion: 35 degrees     Right Ankle/Foot   Dorsiflexion (ke): 8 degrees   Plantar flexion: 44 degrees     Additional Active Range of Motion Details  SLR on right at 60 degrees and left at 65 degrees      Strength/Myotome Testing     Left Hip   Planes of Motion   Flexion: 4+  Extension: 4+  Abduction: 4+  Adduction: 4+    Right Hip   Planes of Motion   Flexion: 5  Extension: 4+  Abduction: 4+  Adduction: 5    Left Knee   Flexion: 4+  Extension: 4+    Right Knee   Flexion: 5  Extension: 5    Left Ankle/Foot Dorsiflexion: 5  Plantar flexion: 5    Right Ankle/Foot   Dorsiflexion: 5  Plantar flexion: 5    Tests     Lumbar     Right   Positive crossed SLR  Ambulation     Ambulation: Level Surfaces   Ambulation without assistive device: independent    Additional Level Surfaces Ambulation Details  Patient ambulates with decrease in bilateral step length and decrease in bilateral ankle PF with stance phase  Ambulation: Stairs   Ascend stairs: independent  Pattern: reciprocal  Railings: two rails  Descend stairs: independent  Pattern: reciprocal  Railings: two rails    Additional Stairs Ambulation Details  Patient presents with bilateral ue support with decrease in unilateral le push off with ascent and lateral step down and decrease in pace with stair descent  Comments   Tug is at 9 40 seconds  m CTSIB: Eyes Open on Firm Surface with patient sway index at 0 54; Eyes Closed on Firm Surface with patient sway index at 0 89; Eyes Open on Foam Surface with patient sway index at 1 03; Eyes Closed on Foam Surface with patient sway index at 2 89  Flowsheet Rows      Most Recent Value   PT/OT G-Codes   Current Score  64   Projected Score  62   FOTO information reviewed  Yes   Assessment Type  Discharge   G code set  Mobility: Walking & Moving Around   Mobility: Walking and Moving Around Goal Status ()  CJ   Mobility: Walking and Moving Around Discharge Status ()  CJ          Precautions: Patient noted she is vitamin D deficit, Asthma, Colitis, likens planus, Osteoporosis, Seasonal Allergies, sulfa medication allergy, and surgical history of hysterectomy and tubal ligation          Precautions: Patient noted she is vitamin D deficit, Asthma, Colitis, likens planus, Osteoporosis, Seasonal Allergies, sulfa medication allergy, and surgical history of hysterectomy and tubal ligation           Daily Treatment Diary                                                                                                                                                            Exercise Diary  5/17 5/21 5/23 5/29 6/4 6/8  6/11         bike 10 min  10 min  10 min  10min  10 min  10 min   10 min         Standing back bends with back against plinth 20x 20x 3sec  20x 3sec  NT  20sec 5x  20x 3sec   NT         LAQ:B: 20x 3sec  30x 2sec 2#  30x  2 5#  NT  NT  NT   NT         Hip flexion:B: 20x 3sec  30x 2sec 2#  30x 2 5# NT  NT  NT   NT         Ankle arom seated:B:   NT  NT  NT  NT  NT   NT         Hamstring stretch:B: 20sec 5x  NT  20 sec 5x  NT  20sec 5x  NT   20 sec x 5         Piriformis stretch:B: 20sec 5x  NT  20sec 5x  NT  20sec 5x  NT   20 sec  x5         LTR:B: 3sec 20x NT  NT  NT  3sec 20x  NT   10 sec x 5         DLS abdominal bracing NT  NT  NT  NT  NT  NT   NT         DLS abdominal bracing with hip flexion:B: NT  NT  NT  NT  NT  NT   NT         DLS abdominal bracing with slr flexion:B: NT NT  30x 2 5#  NT  30x 2 5#  NT   NT         Opposite ue to le DLS:B: NT  NT  NT  NT  NT  NT   NT         bridges 20x  NT  30x 3sec  NT  30x 3sec  NT   NT         Prone lying NT  NT  NT  NT  NT  NT   NT         Prone press ups NT  NT  NT  NT  NT  NT   NT         Mini squats with DLS NT  19V  46S  51W  30x  NT   3 x 10         Lunges with DLS:B: NT  NT  NT   79M  NT  NT   3 x 10         SLS:B: NT  5sec 10x B  5sec 10x  NT  NT NT   30 sec x 2         Tandem walk    6x across the BB on foam  6x across BBon foam  NT  6x across  6x 3#   NT         Tandem stance:B: NT 5sec 10x  NT  NT NT 10sec 10x   30 sec x 2         slr x 3:B: 20x  20x 2#n 30x  2 5#  NT  30x 2 5#  3# 20x  NT         HR and TR:B: 20x  30x  30x  30x 30x  30x  NT         Backwards marching 25' 6x NT  NT  NT  4x 2 5#  2x3#  NT         opp hand/ opp knee 25'       4x 25' 2#   4x 2 5#  2x 3#  NT         Backwards 25'       4x 25' 2#  4x 2 5#  2x 3#   NT                                 Side stepping 25' 6x inside bars  NT  4x 25' 4x 25' 2#  4x 2 5#  2x 3#  NT         Step up B 6" 20x 20x 2#  NT  NT NT  30x   NT         Side stepping with squats 25' NT  NT  4x 25' 4x 25' 2#  4x 2 5#  2x 3#  2 x          Metafused balance system: limits of stability and  maze NT  NT  NT  5x each  5x each   Level 10 5x eachlevel 10   NT         Lifefitness balance walk forw/back   S/L alt sides    3x each way  NT  25# 4x each  NT  NT   NT         Life fitness curls           50# 20x   50#x20         ext           15# 20x   20#x20         Leg press            50# 20x   50#x20         abd            40# 20x  45#x20                                       Modalities  5/16 5/21 5/23 5/29 6/4 6/8 6/11         TENS and MHP to lumbar spine prone PRN 10 min  NT  NT  15 min  10 min  10 min     NT

## 2018-06-12 ENCOUNTER — APPOINTMENT (OUTPATIENT)
Dept: PHYSICAL THERAPY | Age: 68
End: 2018-06-12
Payer: MEDICARE

## 2018-06-13 ENCOUNTER — OFFICE VISIT (OUTPATIENT)
Dept: PHYSICAL THERAPY | Age: 68
End: 2018-06-13
Payer: MEDICARE

## 2018-06-13 DIAGNOSIS — I89.0 LYMPHEDEMA: Primary | ICD-10-CM

## 2018-06-13 PROCEDURE — 97110 THERAPEUTIC EXERCISES: CPT

## 2018-06-13 PROCEDURE — 97140 MANUAL THERAPY 1/> REGIONS: CPT

## 2018-06-13 NOTE — PROGRESS NOTES
Daily Note     Today's date: 2018  Patient name: Bro Power  : 1950  MRN: [id number]  Referring provider: Thurnell Boas , MD  Dx:   Encounter Diagnosis     ICD-10-CM    1  Lymphedema I89 0                   Subjective: Pt  Will be away for two weeks  Objective: See treatment diary below  B/L LE girth reduction  Assessment: Tolerated treatment well  Patient would benefit from continued PT      Plan: Continue per plan of care         Manual  18               Mld massage and soft tissue mob left le greater than right I eval                     graston to left calf emphasis and right distal le    man  man  40 min  40 min               Fit for compression thigh high measured today  perf                                                                          Exercise Diary                 /lifecycle man    15 min   15 min  15 min               Lymph le exer packet    issued 20 reps each exer  yes                                                                                                                                                                                                                                                                                                                                                                                                                                                                       Modalities                       Trial right le compression pump 40 mmHG with elevation of right le

## 2018-06-28 ENCOUNTER — OFFICE VISIT (OUTPATIENT)
Dept: PHYSICAL THERAPY | Age: 68
End: 2018-06-28
Payer: MEDICARE

## 2018-06-28 DIAGNOSIS — I89.0 LYMPHEDEMA: Primary | ICD-10-CM

## 2018-06-28 PROCEDURE — G8979 MOBILITY GOAL STATUS: HCPCS

## 2018-06-28 PROCEDURE — G8978 MOBILITY CURRENT STATUS: HCPCS

## 2018-06-28 PROCEDURE — 97140 MANUAL THERAPY 1/> REGIONS: CPT

## 2018-07-01 NOTE — PROGRESS NOTES
Daily Note     Today's date: 2018  Patient name: Nia Gonzalez  : 1950  MRN: [id number]  Referring provider: Dg Sharma MD  Dx:   Encounter Diagnosis     ICD-10-CM    1  Lymphedema I89 0                   Subjective: " My legs are down in size "        Objective: See treatment diary below  Manual  18             Mld massage and soft tissue mob left le greater than right I eval        30 min man             graston to left calf emphasis and right distal le    man  man  40 min  40 min  man 15             Fit for compression thigh high measured today  perf       girth taken                                                                   Exercise Diary               /lifecycle man    15 min   15 min  15 min               Lymph le exer packet    issued 20 reps each exer  yes                                                                                                                                                                                                                                                                                                                                                                                                                                                                       Modalities                       Trial right le compression pump 40 mmHG with elevation of right le                                                                                 Assessment: Tolerated treatment well  Patient would benefit from continued PT measurements for compression garments taken  Plan: Progress treatment as tolerated

## 2018-07-03 ENCOUNTER — OFFICE VISIT (OUTPATIENT)
Dept: PHYSICAL THERAPY | Age: 68
End: 2018-07-03
Payer: MEDICARE

## 2018-07-03 DIAGNOSIS — I89.0 LYMPHEDEMA: Primary | ICD-10-CM

## 2018-07-03 PROCEDURE — 97140 MANUAL THERAPY 1/> REGIONS: CPT

## 2018-07-03 PROCEDURE — 97530 THERAPEUTIC ACTIVITIES: CPT

## 2018-07-03 PROCEDURE — 97016 VASOPNEUMATIC DEVICE THERAPY: CPT

## 2018-07-09 NOTE — PROGRESS NOTES
Daily Note     Today's date: 2018  Patient name: Karolyn Grande  : 1950  MRN: [id number]  Referring provider: Ernesto Bernal MD  Dx:   Encounter Diagnosis     ICD-10-CM    1  Lymphedema I89 0                   Subjective: no new c/o's      Objective: See treatment diary below    Manual  18  7           Mld massage and soft tissue mob left le greater than right I eval        30 min man             graston to left calf emphasis and right distal le    man  man  40 min  40 min  man 15  man 15           Fit for compression thigh high measured today  perf       girth taken                                                                   Exercise Diary  5/24  5/31  6/6  6/13  6/28  7/3           /lifecycle man    15 min   15 min  15 min    15 min            Lymph le exer packet    issued 20 reps each exer  yes                                                                                                                                                                                                                                                                                                                                                                                                                                                                       Modalities  5/24          7/3           Trial right le compression pump 40 mmHG with elevation of right le            30 min                                                                 Assessment: Tolerated treatment well  Patient would benefit from continued PT      Plan: Progress treatment as tolerated

## 2018-07-16 ENCOUNTER — OFFICE VISIT (OUTPATIENT)
Dept: PHYSICAL THERAPY | Age: 68
End: 2018-07-16
Payer: MEDICARE

## 2018-07-16 DIAGNOSIS — I89.0 LYMPHEDEMA: Primary | ICD-10-CM

## 2018-07-16 PROCEDURE — G8979 MOBILITY GOAL STATUS: HCPCS

## 2018-07-16 PROCEDURE — 97750 PHYSICAL PERFORMANCE TEST: CPT

## 2018-07-16 PROCEDURE — G8978 MOBILITY CURRENT STATUS: HCPCS

## 2018-07-16 PROCEDURE — 97530 THERAPEUTIC ACTIVITIES: CPT

## 2018-07-16 PROCEDURE — 97016 VASOPNEUMATIC DEVICE THERAPY: CPT

## 2018-07-18 NOTE — PROGRESS NOTES
PT Re-Evaluation     Today's date: 2018  Patient name: Carl Daniel  : 1950  MRN: [id number]  Referring provider: Jeramie Avina MD  Dx:   Encounter Diagnosis     ICD-10-CM    1  Lymphedema I89 0                   Assessment  Functional limitations: all improved  pt now awaiting compression garments and home compression pump for bilateral le's 40 mmHG, prolonged sitting causes increased edema both le's pt now to change work station consider [address] details: The pt has made steady progress in le girth reduction of both le's , decreased tissue fibrosis and increased activity   PT is now awaiting compression garments and home compression pump for both le's 400 MHG with bilateral le elevation  Recommend to continue PT at this time until receipt of garments and pump  PT to consider [employer] to increased sit to stand activity versus static sitting exer  Understanding of Dx/Px/POC: good   Prognosis: good    Goals  STG 1  Independent in there exer program in two weeks  met           2  Reduction of girth by 2 cm in two weeks    Met   Further le girth reduction by 1 cm in two weeks     LTG 1 independence with home compression pump usage not yet met          2 The pt shall be independent in donning and doffing compression garments   Not yet met    Plan  Patient would benefit from: PT eval, lymphedema eval and skilled physical therapy  Referral necessary: Yes  Planned therapy interventions: therapeutic activities, therapeutic exercise, compression, massage, manual therapy, neuromuscular re-education, muscle pump exercises and home exercise program  Frequency: 2x week  Duration in weeks: 4  Treatment plan discussed with: patient        Subjective Evaluation    History of Present Illness  Mechanism of injury: See ieval  Pt has made steady progress towards le girth reduction with there exer for improved lymphatic drainage, and compression pump ,and mld massage and soft tissue mobilization, Pt now awaiting compression garments thigh highs and knee highs and home compression pump     Not a recurrent problem   Quality of life: good    Pain  Current pain ratin  At best pain ratin  At worst pain ratin  Location: le  Relieving factors: support  Progression: improved    Treatments  Current treatment: massage and physical therapy  Patient Goals  Patient goals for therapy: decreased edema, decreased pain, increased motion, return to sport/leisure activities and independence with ADLs/IADLs  Patient goal: reduce le girth and improve tolerance to prolonged walking, standing and sitting  met        Objective     Active Range of Motion   Left Ankle/Foot   Dorsiflexion (ke): 20 degrees     Right Ankle/Foot   Dorsiflexion (ke): 20 degrees     Additional Active Range of Motion Details  slr 0-80 bilaterally     Strength/Myotome Testing     Left Hip   Normal muscle strength    Right Hip   Normal muscle strength    Left Knee   Normal strength    Right Knee   Normal strength    Ambulation     Ambulation: Level Surfaces   Ambulation without assistive device: independent        18 Left  Right  reev left  Right      mtp 21 5 22  19  21     Lat malleolus 23 22 5  22  22     4cm prox to lat mal 20 5 20  20  19     8 22 5 22  21  21     12 28 27 5  24 5  25     16 32 32  29 5  30 5     20 35 5 35  33 5  34     24 37 36  35  35     28 38 37  36 5  35     32 38 37  37  36     36 39 38 5  37  36     40 42 5 44  40  41     44 43 5 44 5  41 5  42 5     48 46 48  43  44 5     52 50 50  45  45cm     56 54 54cm                                                 Precautions: none    Daily Treatment Diary     Manual              Mld massage and soft tissue mobilization bilateral le's Girth measurements for reassessment of status            [facility] lower calf                                                        Exercise Diary              Nu step  15 min r 4 Modalities  7/16            Compression pump bilateral le's 40 mmHG legs elevated 30 min

## 2018-07-19 ENCOUNTER — OFFICE VISIT (OUTPATIENT)
Dept: PHYSICAL THERAPY | Age: 68
End: 2018-07-19
Payer: MEDICARE

## 2018-07-19 DIAGNOSIS — I89.0 LYMPHEDEMA: Primary | ICD-10-CM

## 2018-07-19 PROCEDURE — 97016 VASOPNEUMATIC DEVICE THERAPY: CPT

## 2018-07-19 PROCEDURE — 97530 THERAPEUTIC ACTIVITIES: CPT

## 2018-07-20 NOTE — PROGRESS NOTES
Daily Note     Today's date: 2018  Patient name: Meagan Trevino  : 1950  MRN: [id number]  Referring provider: May Urbina MD  Dx:   Encounter Diagnosis     ICD-10-CM    1  Lymphedema I89 0                   Subjective: "my legs are down in size "      Objective: See treatment diary below    Manual                     Mld massage and soft tissue mobilization bilateral le's Girth measurements for reassessment of status                     [facility] lower calf                                                                                                     Exercise Diary                     Nu step  15 min r 4  15 mi nr 4                                                                                                                                                                                                                                                                                                                                                                                                                                                                                                 Modalities                     Compression pump bilateral le's 40 mmHG legs elevated 30 min  45 min                                                                            Assessment: Tolerated treatment well  Patient would benefit from continued PT      Plan: Progress treatment as tolerated

## 2018-07-24 ENCOUNTER — OFFICE VISIT (OUTPATIENT)
Dept: PHYSICAL THERAPY | Age: 68
End: 2018-07-24
Payer: MEDICARE

## 2018-07-24 ENCOUNTER — OFFICE VISIT (OUTPATIENT)
Dept: URGENT CARE | Age: 68
End: 2018-07-24
Payer: MEDICARE

## 2018-07-24 VITALS
OXYGEN SATURATION: 94 % | DIASTOLIC BLOOD PRESSURE: 69 MMHG | SYSTOLIC BLOOD PRESSURE: 148 MMHG | HEART RATE: 80 BPM | RESPIRATION RATE: 18 BRPM | WEIGHT: 184 LBS | HEIGHT: 64 IN | BODY MASS INDEX: 31.41 KG/M2 | TEMPERATURE: 98.7 F

## 2018-07-24 DIAGNOSIS — I89.0 LYMPHEDEMA: Primary | ICD-10-CM

## 2018-07-24 DIAGNOSIS — J01.40 ACUTE NON-RECURRENT PANSINUSITIS: Primary | ICD-10-CM

## 2018-07-24 PROCEDURE — 97016 VASOPNEUMATIC DEVICE THERAPY: CPT

## 2018-07-24 PROCEDURE — 99213 OFFICE O/P EST LOW 20 MIN: CPT | Performed by: FAMILY MEDICINE

## 2018-07-24 PROCEDURE — G0463 HOSPITAL OUTPT CLINIC VISIT: HCPCS | Performed by: FAMILY MEDICINE

## 2018-07-24 PROCEDURE — 97530 THERAPEUTIC ACTIVITIES: CPT

## 2018-07-24 RX ORDER — BRINZOLAMIDE 10 MG/ML
SUSPENSION/ DROPS OPHTHALMIC
COMMUNITY

## 2018-07-24 RX ORDER — BUDESONIDE 3 MG/1
CAPSULE, COATED PELLETS ORAL
COMMUNITY
Start: 2018-03-05

## 2018-07-24 RX ORDER — CHLORHEXIDINE GLUCONATE 0.12 MG/ML
RINSE ORAL
COMMUNITY

## 2018-07-24 RX ORDER — CLOBETASOL PROPIONATE 0.05 MG/G
GEL TOPICAL
COMMUNITY

## 2018-07-24 RX ORDER — AZITHROMYCIN 250 MG/1
TABLET, FILM COATED ORAL
Qty: 6 TABLET | Refills: 0 | Status: SHIPPED | OUTPATIENT
Start: 2018-07-24 | End: 2018-07-28

## 2018-07-24 RX ORDER — FEXOFENADINE HCL 180 MG/1
TABLET ORAL
COMMUNITY

## 2018-07-24 NOTE — PROGRESS NOTES
[facility]        NAME: Nicho Colon is a 70 y o  female  : 1950    MRN: [id number]  DATE: 2018  TIME: 6:28 PM    Assessment and Plan   Acute non-recurrent pansinusitis [J01 40]  1  Acute non-recurrent pansinusitis  azithromycin (ZITHROMAX) 250 mg tablet         Patient Instructions     Patient Instructions   Rest, limit activity, change positions slowly  Z-Jacinto as directed two initially then 1 daily until finished (please take probiotics - try acidophilus)  Light diet  Sinus medication as discussed  Tylenol as needed  Use meclizine as needed  Recheck/follow-up with family physician  Please go to the hospital emergency department if needed  Follow up with PCP in 2-3 days  Proceed to  ER if symptoms worsen  Chief Complaint     Chief Complaint   Patient presents with    Dizziness     woke up this morning feeling dizzy         History of Present Illness       Patient with dizziness, nausea, ear discomfort, sinus pressure; patient states she has meclizine at home        Review of Systems   Review of Systems   Constitutional: Positive for fatigue  HENT: Positive for congestion and sinus pressure  Respiratory: Negative  Cardiovascular: Negative  Gastrointestinal: Positive for nausea  Negative for vomiting  Musculoskeletal: Negative  Skin: Negative  Neurological: Positive for dizziness  Current Medications       Current Outpatient Prescriptions:     Albuterol Sulfate 108 (90 Base) MCG/ACT AEPB, Inhale 2 puffs every 4 (four) hours, Disp: , Rfl:     budesonide (ENTOCORT EC) 3 MG capsule, TAKE 3 CAPSULES DAILY  , Disp: , Rfl:     azithromycin (ZITHROMAX) 250 mg tablet, Take 2 tablets today then 1 tablet daily x 4 days, Disp: 6 tablet, Rfl: 0    beclomethasone (QVAR) 40 MCG/ACT inhaler, Inhale 2 puffs, Disp: , Rfl:     brinzolamide (AZOPT) 1 % ophthalmic suspension, Azopt 1 % eye drops,suspension, Disp: , Rfl:     chlorhexidine (PERIDEX) 0 12 % solution, chlorhexidine gluconate 0 12 % mouthwash, Disp: , Rfl:     clobetasol (TEMOVATE) 0 05 % GEL, clobetasol 0 05 % topical gel, Disp: , Rfl:     fexofenadine (ALLEGRA ALLERGY) 180 MG tablet, Allegra, Disp: , Rfl:     Current Allergies     Allergies as of 07/24/2018 - Reviewed 07/24/2018   Allergen Reaction Noted    Aspirin Shortness Of Breath 04/16/2015    Penicillin g Anaphylaxis     Lidocaine Other (See Comments) 01/01/2017    Other  01/26/2016    Sulfa antibiotics Other (See Comments) 03/26/2010    Nabumetone Rash and Angioedema 04/16/2015            The following portions of the patient's history were reviewed and updated as appropriate: allergies, current medications, past family history, past medical history, past social history, past surgical history and problem list      No past medical history on file  No past surgical history on file  No family history on file  Medications have been verified  Objective   /69   Pulse 80   Temp 98 7 °F (37 1 °C)   Resp 18   Ht 5' 4" (1 626 m)   Wt 83 5 kg (184 lb)   SpO2 94%   BMI 31 58 kg/m²        Physical Exam     Physical Exam   Constitutional: She is oriented to person, place, and time  She appears well-developed and well-nourished  HENT:   Nasal congestion; tenderness over the sinuses; slight retraction of both eardrums; injection of the oropharynx   Eyes: Conjunctivae and EOM are normal  Pupils are equal, round, and reactive to light  Neck: Normal range of motion  Neck supple  Cardiovascular: Normal rate, regular rhythm and normal heart sounds  Pulmonary/Chest: Effort normal and breath sounds normal    Abdominal: Soft  She exhibits no distension  Generalized lower abdominal discomfort   Neurological: She is alert and oriented to person, place, and time  No nuchal rigidity   Skin: Skin is warm  Fair color and turgor   Psychiatric: She has a normal mood and affect   Her behavior is normal    Nursing note and vitals reviewed

## 2018-07-24 NOTE — PROGRESS NOTES
Daily Note     Today's date: 2018  Patient name: Carl Daniel  : 1950  MRN: [id number]  Referring provider: Jeramie Avina MD  Dx:   Encounter Diagnosis     ICD-10-CM    1  Lymphedema I89 0                   Subjective: My left ear may be infected as I have vertigo"      Objective: See treatment diary below    [facility]             Mld massage and soft tissue mobilization bilateral le's Girth measurements for reassessment of status                     [facility] lower calf                                                                                                     Exercise Diary                   Nu step  15 min r 4  15 mi nr 4   15min                                                                                                                                                                                                                                                                                                                                                                                                                                                                                                Modalities                   Compression pump bilateral le's 40 mmHG legs elevated 30 min  45 min   45 min                                                                             Assessment: Tolerated treatment well  Patient demonstrated fatigue post treatment and would benefit from continued PT      Plan: Progress treatment as tolerated

## 2018-07-24 NOTE — PATIENT INSTRUCTIONS
Rest, limit activity, change positions slowly  Z-Jacinto as directed two initially then 1 daily until finished (please take probiotics - try acidophilus)  Light diet  Sinus medication as discussed  Tylenol as needed  Use meclizine as needed  Recheck/follow-up with family physician  Please go to the hospital emergency department if needed

## 2018-07-26 ENCOUNTER — OFFICE VISIT (OUTPATIENT)
Dept: PHYSICAL THERAPY | Age: 68
End: 2018-07-26
Payer: MEDICARE

## 2018-07-26 DIAGNOSIS — I89.0 LYMPHEDEMA: Primary | ICD-10-CM

## 2018-07-26 PROCEDURE — 97110 THERAPEUTIC EXERCISES: CPT

## 2018-07-26 PROCEDURE — 97016 VASOPNEUMATIC DEVICE THERAPY: CPT

## 2018-07-26 NOTE — PROGRESS NOTES
Daily Note     Today's date: 2018  Patient name: Crista Kenny  : 1950  MRN: [id number]  Referring provider: Adrian Burgos MD  Dx:   Encounter Diagnosis     ICD-10-CM    1  Lymphedema I89 0                   Subjective: pt  Reports she feels her ankles are as small as they are going to get  One more visit then DC to HEP  Objective: See treatment diary below      Assessment: Tolerated treatment well   Patient would benefit from continued PT      Plan: Continue per plan of care        [facility]            Mld massage and soft tissue mobilization bilateral le's Girth measurements for reassessment of status                     [facility] lower calf                                                                                                     Exercise Diary                 Nu step  15 min r 4  15 mi nr 4   15min   15min                                                                                                                                                                                                                                                                                                                                                                                                                                                                                             Modalities                 Compression pump bilateral le's 40 mmHG legs elevated 30 min  45 min   45 min   45 min

## 2018-08-03 ENCOUNTER — APPOINTMENT (OUTPATIENT)
Dept: PHYSICAL THERAPY | Age: 68
End: 2018-08-03
Payer: MEDICARE

## 2018-08-15 ENCOUNTER — APPOINTMENT (OUTPATIENT)
Dept: PHYSICAL THERAPY | Age: 68
End: 2018-08-15
Payer: MEDICARE

## 2018-08-16 ENCOUNTER — EVALUATION (OUTPATIENT)
Dept: PHYSICAL THERAPY | Age: 68
End: 2018-08-16
Payer: MEDICARE

## 2018-08-16 DIAGNOSIS — I89.0 LYMPHEDEMA: Primary | ICD-10-CM

## 2018-08-16 PROCEDURE — 97016 VASOPNEUMATIC DEVICE THERAPY: CPT

## 2018-08-16 PROCEDURE — 97140 MANUAL THERAPY 1/> REGIONS: CPT

## 2018-08-16 PROCEDURE — G8978 MOBILITY CURRENT STATUS: HCPCS

## 2018-08-16 PROCEDURE — G8979 MOBILITY GOAL STATUS: HCPCS

## 2018-08-16 PROCEDURE — 97110 THERAPEUTIC EXERCISES: CPT

## 2018-08-16 NOTE — PROGRESS NOTES
Daily Note     Today's date: 2018  Patient name: Gwendolyn Gatica  : 1950  MRN: [id number]  Referring provider: Huan Villagomez MD  Dx:   Encounter Diagnosis     ICD-10-CM    1  Lymphedema I89 0                   Subjective: Pt  Still waiting for her compression stockings  Objective: See treatment diary below      Assessment: Tolerated treatment well   Patient would benefit from continued PT      Plan: Continue per plan of care        [facility]           Mld massage and soft tissue mobilization bilateral le's Girth measurements for reassessment of status      measurements               [facility] lower calf                                                                                                     Exercise Diary               Nu step  15 min r 4  15 mi nr 4   15min   15min  15 min                                                                                                                                                                                                                                                                                                                                                                                                                                                                                           Modalities               Compression pump bilateral le's 40 mmHG legs elevated 30 min  45 min   45 min   45 min  30 min

## 2019-03-18 ENCOUNTER — OFFICE VISIT (OUTPATIENT)
Dept: URGENT CARE | Age: 69
End: 2019-03-18
Payer: MEDICARE

## 2019-03-18 VITALS
WEIGHT: 183.2 LBS | HEART RATE: 70 BPM | BODY MASS INDEX: 31.28 KG/M2 | RESPIRATION RATE: 20 BRPM | HEIGHT: 64 IN | DIASTOLIC BLOOD PRESSURE: 76 MMHG | SYSTOLIC BLOOD PRESSURE: 144 MMHG | OXYGEN SATURATION: 98 % | TEMPERATURE: 98 F

## 2019-03-18 DIAGNOSIS — J01.90 ACUTE SINUSITIS, RECURRENCE NOT SPECIFIED, UNSPECIFIED LOCATION: Primary | ICD-10-CM

## 2019-03-18 PROCEDURE — 99214 OFFICE O/P EST MOD 30 MIN: CPT | Performed by: FAMILY MEDICINE

## 2019-03-18 PROCEDURE — G0463 HOSPITAL OUTPT CLINIC VISIT: HCPCS | Performed by: FAMILY MEDICINE

## 2019-03-18 RX ORDER — DOXYCYCLINE 100 MG/1
100 TABLET ORAL 2 TIMES DAILY
Qty: 14 TABLET | Refills: 0 | Status: SHIPPED | OUTPATIENT
Start: 2019-03-18 | End: 2019-03-25

## 2019-03-18 NOTE — PATIENT INSTRUCTIONS
Rest and drink extra fluids  Start antibiotic  Take probiotic  Continue all allergy medications and Flonase at this time  Continue with follow-up appointment with allergist next week  Nasal saline or Neti pot can help flushes sinuses  Follow-up family doctor no improvement  Go to the ER with any worsening symptoms, shortness of breath, chest pain or difficulty breathing

## 2019-03-18 NOTE — PROGRESS NOTES
[facility]        NAME: Princess Oh is a 70 y o  female  : 1950    MRN: [id number]  DATE: 2019  TIME: 5:11 PM    Assessment and Plan   Acute sinusitis, recurrence not specified, unspecified location [J01 90]  1  Acute sinusitis, recurrence not specified, unspecified location  doxycycline (ADOXA) 100 MG tablet         Patient Instructions     Patient Instructions   Rest and drink extra fluids  Start antibiotic  Take probiotic  Continue all allergy medications and Flonase at this time  Continue with follow-up appointment with allergist next week  Nasal saline or Neti pot can help flushes sinuses  Follow-up family doctor no improvement  Go to the ER with any worsening symptoms, shortness of breath, chest pain or difficulty breathing  Chief Complaint     Chief Complaint   Patient presents with    Sinus Problem     pt has a hx of sinus problems and was seen recently at ENT  pt states she now has a greenish drainage and feels a "sinus headache"  Pt called ent and cannot get in until next week  History of Present Illness   Princess Oh presents to the clinic c/o    This is a 27-year-old female here today with complaints of sinus pressure and congestion  She has had issues with her sinuses in the past   She is currently seen ENT and may possibly need surgery on her sinuses  She was seen recently there and they discussed this option  Over the last several days she has had increased sinus pressure and is now having green discharge  She denies fevers but is feeling poorly  She has follow-up with her allergy doctor next week  Review of Systems   Review of Systems   Constitutional: Positive for activity change  HENT: Positive for congestion, sinus pressure and sinus pain  Respiratory: Negative for cough  Cardiovascular: Negative  Skin: Negative  Neurological: Negative  Psychiatric/Behavioral: Negative            Current Medications Long-Term Medications   Medication Sig Dispense Refill    beclomethasone (QVAR) 40 MCG/ACT inhaler Inhale 2 puffs      brinzolamide (AZOPT) 1 % ophthalmic suspension Azopt 1 % eye drops,suspension      clobetasol (TEMOVATE) 0 05 % GEL clobetasol 0 05 % topical gel      fexofenadine (ALLEGRA ALLERGY) 180 MG tablet Allegra         Current Allergies     Allergies as of 03/18/2019 - Reviewed 03/18/2019   Allergen Reaction Noted    Aspirin Shortness Of Breath 04/16/2015    Penicillin g Anaphylaxis     Lidocaine Other (See Comments) 01/01/2017    Other  01/26/2016    Sulfa antibiotics Other (See Comments) 03/26/2010    Nabumetone Rash and Angioedema 04/16/2015            The following portions of the patient's history were reviewed and updated as appropriate: allergies, current medications, past family history, past medical history, past social history, past surgical history and problem list     Objective   /76 (BP Location: Left arm, Patient Position: Sitting, Cuff Size: Standard)   Pulse 70   Temp 98 °F (36 7 °C)   Resp 20   Ht 5' 4" (1 626 m)   Wt 83 1 kg (183 lb 3 2 oz)   SpO2 98%   BMI 31 45 kg/m²        Physical Exam     Physical Exam   Constitutional: She is oriented to person, place, and time  She appears well-developed and well-nourished  HENT:   Right Ear: External ear normal    Left Ear: External ear normal    Sinus pressure   Neck: Normal range of motion  Neck supple  Cardiovascular: Normal rate and regular rhythm  Pulmonary/Chest: Effort normal and breath sounds normal    Neurological: She is alert and oriented to person, place, and time  Skin: Skin is warm and dry  Psychiatric: She has a normal mood and affect  Her behavior is normal    Nursing note and vitals reviewed

## 2019-06-11 ENCOUNTER — OFFICE VISIT (OUTPATIENT)
Dept: URGENT CARE | Age: 69
End: 2019-06-11
Payer: MEDICARE

## 2019-06-11 VITALS
BODY MASS INDEX: 29.53 KG/M2 | RESPIRATION RATE: 18 BRPM | WEIGHT: 173 LBS | DIASTOLIC BLOOD PRESSURE: 68 MMHG | SYSTOLIC BLOOD PRESSURE: 144 MMHG | OXYGEN SATURATION: 99 % | HEART RATE: 63 BPM | HEIGHT: 64 IN | TEMPERATURE: 98.3 F

## 2019-06-11 DIAGNOSIS — J45.21 MILD INTERMITTENT ASTHMA WITH ACUTE EXACERBATION: ICD-10-CM

## 2019-06-11 DIAGNOSIS — J01.10 ACUTE NON-RECURRENT FRONTAL SINUSITIS: ICD-10-CM

## 2019-06-11 DIAGNOSIS — J02.9 SORE THROAT: ICD-10-CM

## 2019-06-11 DIAGNOSIS — J06.9 ACUTE UPPER RESPIRATORY INFECTION: Primary | ICD-10-CM

## 2019-06-11 LAB — S PYO AG THROAT QL: NEGATIVE

## 2019-06-11 PROCEDURE — 87070 CULTURE OTHR SPECIMN AEROBIC: CPT | Performed by: FAMILY MEDICINE

## 2019-06-11 PROCEDURE — 87880 STREP A ASSAY W/OPTIC: CPT | Performed by: FAMILY MEDICINE

## 2019-06-11 PROCEDURE — G0463 HOSPITAL OUTPT CLINIC VISIT: HCPCS | Performed by: FAMILY MEDICINE

## 2019-06-11 PROCEDURE — 99213 OFFICE O/P EST LOW 20 MIN: CPT | Performed by: FAMILY MEDICINE

## 2019-06-11 RX ORDER — AZITHROMYCIN 250 MG/1
TABLET, FILM COATED ORAL
Qty: 6 TABLET | Refills: 0 | Status: SHIPPED | OUTPATIENT
Start: 2019-06-11 | End: 2019-06-15

## 2019-06-11 RX ORDER — METHYLPREDNISOLONE 4 MG/1
TABLET ORAL
Qty: 21 TABLET | Refills: 0 | Status: SHIPPED | OUTPATIENT
Start: 2019-06-11

## 2019-06-13 LAB — BACTERIA THROAT CULT: NORMAL

## 2019-06-30 ENCOUNTER — OFFICE VISIT (OUTPATIENT)
Dept: URGENT CARE | Age: 69
End: 2019-06-30
Payer: MEDICARE

## 2019-06-30 VITALS
RESPIRATION RATE: 18 BRPM | TEMPERATURE: 98.5 F | SYSTOLIC BLOOD PRESSURE: 176 MMHG | DIASTOLIC BLOOD PRESSURE: 69 MMHG | HEART RATE: 73 BPM | BODY MASS INDEX: 29.37 KG/M2 | WEIGHT: 172 LBS | HEIGHT: 64 IN | OXYGEN SATURATION: 98 %

## 2019-06-30 DIAGNOSIS — J01.90 ACUTE SINUSITIS, RECURRENCE NOT SPECIFIED, UNSPECIFIED LOCATION: Primary | ICD-10-CM

## 2019-06-30 PROCEDURE — 99213 OFFICE O/P EST LOW 20 MIN: CPT | Performed by: FAMILY MEDICINE

## 2019-06-30 PROCEDURE — G0463 HOSPITAL OUTPT CLINIC VISIT: HCPCS | Performed by: FAMILY MEDICINE

## 2019-06-30 RX ORDER — BIMATOPROST 0.01 %
0 DROPS OPHTHALMIC (EYE) 2 TIMES DAILY
Refills: 11 | COMMUNITY
Start: 2019-05-03

## 2019-06-30 RX ORDER — VENLAFAXINE HYDROCHLORIDE 37.5 MG/1
37.5 CAPSULE, EXTENDED RELEASE ORAL ONCE
COMMUNITY

## 2019-06-30 RX ORDER — ESTRADIOL 0.1 MG/G
0.01 CREAM VAGINAL ONCE AS NEEDED
COMMUNITY

## 2019-06-30 RX ORDER — CEFUROXIME AXETIL 500 MG/1
500 TABLET ORAL EVERY 12 HOURS SCHEDULED
Qty: 20 TABLET | Refills: 0 | Status: SHIPPED | OUTPATIENT
Start: 2019-06-30 | End: 2019-07-10

## 2019-07-17 ENCOUNTER — TELEPHONE (OUTPATIENT)
Dept: NEPHROLOGY | Facility: CLINIC | Age: 69
End: 2019-07-17

## 2019-07-17 NOTE — TELEPHONE ENCOUNTER
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  She called in because recently she has been experiencing elevated blood pressure  She stated that she went to her PCP to follow the BP issue, but would also like to follow up with her Nephrologist as well to check on BP  She has questions regarding a BP medication her PCP placed her on and how/if it would be affecting her kidneys  She can be reached at [phone]  Patient is scheduled on 9/11 with Dr Lisa Mendieta  I offered her soon appointments with Paramjit, but she refused  Since it has been two years since she's last been seen she may need labs put in as well  If so, please let me know and I can mail them out to her  Thank you!

## 2019-07-25 NOTE — TELEPHONE ENCOUNTER
I was finally able to get a hold of Neri Henryetta  She stated that she was prescribed Losartan Potassium 25mg once daily  She wanted to know if this was okay from your standpoint  Please advise

## 2019-08-02 NOTE — TELEPHONE ENCOUNTER
I was able to get in contact with Tawni Mohs  She has been made aware that continuing Losartan Potassium is okay as long as her levels and creatinine remain stable  She understands this and has no additional questions or concerns

## 2019-09-11 ENCOUNTER — OFFICE VISIT (OUTPATIENT)
Dept: NEPHROLOGY | Facility: CLINIC | Age: 69
End: 2019-09-11
Payer: MEDICARE

## 2019-09-11 VITALS
SYSTOLIC BLOOD PRESSURE: 143 MMHG | RESPIRATION RATE: 18 BRPM | WEIGHT: 168.8 LBS | DIASTOLIC BLOOD PRESSURE: 65 MMHG | HEART RATE: 63 BPM | HEIGHT: 64 IN | BODY MASS INDEX: 28.82 KG/M2

## 2019-09-11 DIAGNOSIS — I77.3 FIBROMUSCULAR DYSPLASIA (HCC): Primary | ICD-10-CM

## 2019-09-11 DIAGNOSIS — E26.1 SECONDARY HYPERALDOSTERONISM (HCC): ICD-10-CM

## 2019-09-11 PROBLEM — I15.0 RENOVASCULAR HYPERTENSION: Status: ACTIVE | Noted: 2019-09-11

## 2019-09-11 PROCEDURE — 99214 OFFICE O/P EST MOD 30 MIN: CPT | Performed by: INTERNAL MEDICINE

## 2019-09-11 RX ORDER — LOSARTAN POTASSIUM 25 MG/1
25 TABLET ORAL DAILY
COMMUNITY

## 2019-09-11 RX ORDER — CHLORAL HYDRATE 500 MG
1000 CAPSULE ORAL DAILY
COMMUNITY

## 2019-09-11 NOTE — PROGRESS NOTES
OFFICE FOLLOW UP - Nephrology   Carl Daniel 78 y o  female MRN: [id number]    Encounter: [id number]        48 [address] Em[address] Finch was seen today for follow-up and hypertension  62-year-old female with a history of a right renal artery fibromuscular dysplasia that was diagnosed by CT angiography in 2011 who presents for follow-up regarding her blood pressure    Diagnoses and all orders for this visit:    Fibromuscular dysplasia ([address] )  Secondary hyperaldosteronism ([address] )  Renovascular hypertension    Most recently she was started on losartan her blood pressures today are less than 347 systolic  At home her blood pressures are averaging 075-131 systolic  Would continue current medication blood work was reviewed from July and is currently stable  Will check a renal artery Doppler for surveillance  Electrolytes and acid-base status acceptable  Blood work has been stable with a normal kidney function  Will follow up on a yearly basis if she is stable        HPI: Carl Daniel is a 78 y o  female who is here for Follow-up and Hypertension    62-year-old who is here with a history of right renal fibromuscular dysplasia diagnosed by CT angiography 6 years ago events for evaluation of fluctuating blood pressure she has a history of no chronic kidney disease in her creatinine has remained around 0 6 she has had no proteinuria or hematuria she does have family history in her  side of on cystic kidney disease and also affected shoulder and as well there was a workup for autoimmune disorders lb or she has been stable otherwise does have some increased stress with working up feels well      ROS:   All the systems were reviewed and were negative except as documented on the HPI  Allergies:  Other; Sulfa antibiotics; and Nabumetone    Medications:   Current Outpatient Medications:     Albuterol Sulfate 108 (90 Base) MCG/ACT AEPB, Inhale 2 puffs every 4 (four) hours, Disp: , Rfl:     beclomethasone (QVAR) 40 MCG/ACT inhaler, Inhale 2 puffs, Disp: , Rfl:     brinzolamide (AZOPT) 1 % ophthalmic suspension, Azopt 1 % eye drops,suspension, Disp: , Rfl:     budesonide (ENTOCORT EC) 3 MG capsule, TAKE 3 CAPSULES DAILY  , Disp: , Rfl:     fexofenadine (ALLEGRA ALLERGY) 180 MG tablet, Allegra, Disp: , Rfl:     losartan (COZAAR) 50 mg tablet, Take 50 mg by mouth daily, Disp: , Rfl:     LUMIGAN 0 01 % ophthalmic drops, Administer 0 001 drops to both eyes 2 (two) times a day, Disp: , Rfl: 11    Omega-3 Fatty Acids (FISH OIL) 1,000 mg, Take 1,000 mg by mouth daily, Disp: , Rfl:     TURMERIC PO, Take by mouth, Disp: , Rfl:     venlafaxine (EFFEXOR-XR) 37 5 mg 24 hr capsule, Take 37 5 mg by mouth once, Disp: , Rfl:     Venlafaxine HCl (EFFEXOR PO), Take 75 tablets by mouth once, Disp: , Rfl:     chlorhexidine (PERIDEX) 0 12 % solution, chlorhexidine gluconate 0 12 % mouthwash, Disp: , Rfl:     Cholecalciferol 5000 units capsule, Take 5,000 Units by mouth daily, Disp: , Rfl:     clobetasol (TEMOVATE) 0 05 % GEL, clobetasol 0 05 % topical gel, Disp: , Rfl:     estradiol (ESTRACE VAGINAL) 0 1 mg/g vaginal cream, Insert 1 03 application into the vagina once as needed, Disp: , Rfl:     methylPREDNISolone 4 MG tablet therapy pack, Use as directed on package (Patient not taking: Reported on 9/11/2019), Disp: 21 tablet, Rfl: 0    History reviewed  No pertinent past medical history  History reviewed  No pertinent surgical history  History reviewed  No pertinent family history  reports that she has never smoked  She has never used smokeless tobacco       Physical Exam:   Vitals:    09/11/19 0922   BP: 143/65   BP Location: Right arm   Patient Position: Sitting   Cuff Size: Standard   Pulse: 63   Resp: 18   Weight: 76 6 kg (168 lb 12 8 oz)   Height: 5' 4" (1 626 m)     Body mass index is 28 97 kg/m²      General: conscious, cooperative, in not acute distress  Eyes: conjunctivae pink, anicteric sclerae  ENT: lips and mucous membranes moist  Neck: supple, no JVD  Chest: clear breath sounds bilateral, no crackles, ronchus or wheezings  CVS: distinct S1 & S2, normal rate, regular rhythm  Abdomen: soft, non-tender, non-distended, normoactive bowel sounds  Extremities: no edema of both legs  Skin: no rash  Neuro: awake, alert, oriented      Lab Results:    Results for orders placed or performed in visit on 06/11/19   Throat culture   Result Value Ref Range    Throat Culture Negative for beta-hemolytic Streptococcus    POCT rapid strepA   Result Value Ref Range     RAPID STREP A Negative Negative     Creatinine 0 6    Portions of the record may have been created with voice recognition software  Occasional wrong word or "sound a like" substitutions may have occurred due to the inherent limitations of voice recognition software  Read the chart carefully and recognize, using context, where substitutions have occurred  If you have any questions, please contact the dictating provider

## 2019-09-11 NOTE — PATIENT INSTRUCTIONS

## 2019-09-18 ENCOUNTER — HOSPITAL ENCOUNTER (OUTPATIENT)
Dept: NON INVASIVE DIAGNOSTICS | Facility: HOSPITAL | Age: 69
Discharge: HOME | End: 2019-09-18
Attending: INTERNAL MEDICINE
Payer: MEDICARE

## 2019-09-18 DIAGNOSIS — I77.3 FIBROMUSCULAR DYSPLASIA (HCC): ICD-10-CM

## 2019-09-18 DIAGNOSIS — E26.1 SECONDARY HYPERALDOSTERONISM (HCC): ICD-10-CM

## 2019-09-18 PROCEDURE — 93975 VASCULAR STUDY: CPT | Performed by: SURGERY

## 2019-09-18 PROCEDURE — 93975 VASCULAR STUDY: CPT

## 2019-09-19 ENCOUNTER — TELEPHONE (OUTPATIENT)
Dept: NEPHROLOGY | Facility: CLINIC | Age: 69
End: 2019-09-19

## 2019-09-19 NOTE — TELEPHONE ENCOUNTER
Patient returned call, I relayed the Renal doppler results and the patient was pleased  She states her BP are controlled at this time and is aware to call the office if they seem to be getting out of control  No further questions or concerns at this time

## 2019-09-19 NOTE — TELEPHONE ENCOUNTER
----- Message from Georgia Moncada DO sent at 9/19/2019  2:30 PM EDT -----  Her renal artery Doppler was reviewed,She has the right renal artery stenosis that remains present, kidney size is relatively preserved    If blood pressures are well controlled she can continue current medication if blood pressure is not controlled t  hurley please let me know

## 2020-03-16 ENCOUNTER — EVALUATION (OUTPATIENT)
Dept: PHYSICAL THERAPY | Age: 70
End: 2020-03-16
Payer: MEDICARE

## 2020-03-16 DIAGNOSIS — M25.551 PAIN OF BOTH HIP JOINTS: Primary | ICD-10-CM

## 2020-03-16 DIAGNOSIS — M25.552 PAIN OF BOTH HIP JOINTS: Primary | ICD-10-CM

## 2020-03-16 PROCEDURE — 97162 PT EVAL MOD COMPLEX 30 MIN: CPT | Performed by: PHYSICAL THERAPIST

## 2020-03-16 NOTE — LETTER
2020    Geraldo Sandoval MD  [address] [place] [address]    Patient: Bar Blevins   YOB: 1950   Date of Visit: 3/16/2020     Encounter Diagnosis     ICD-10-CM    1  Pain of both hip joints M25 551     M25 552        Dear Dr Jose G Sinclair: Thank you for your recent referral of Bar Blevins  Please review the attached evaluation summary from [facility] recent visit  Please verify that you agree with the plan of care by signing the attached order  If you have any questions or concerns, please do not hesitate to call  I sincerely appreciate the opportunity to share in the care of one of your patients and hope to have another opportunity to work with you in the near future  Sincerely,    Vickey Gracia, PT      Referring Provider:      I certify that I have read the below Plan of Care and certify the need for these services furnished under this plan of treatment while under my care  Geraldo Sandoval MD  [address] [place] [address]  VIA Facsimile: [phone]          PT Evaluation     Today's date: 3/16/2020  Patient name: Bar Blevins  : 1950  MRN: [id number]  Referring provider: Frederic Mae MD  Dx:   Encounter Diagnosis     ICD-10-CM    1  Pain of both hip joints M25 551     M25 552                   Assessment  Assessment details: PT IE: 3-  Patient reported onset of bilateral lateral hip pain with insidious onset  Patient noted she uses Biofreeze, Accupressure, and self massage as pain reducing  Patient noted lying on either side as pain aggravating  Patient noted prolonged sitting aggravates bilateral hip pain  Patient noted she has not resumed her fitness center program   Patient noted prolonged walking does aggravate bilateral hip pain  Patient noted she can not squat down due to pain aggravation thus she will sit on the ground and do her iadls    Patient noted she had distal bilateral ankle pain that is due to bilateral le lymphadema  Patient noted she has stairs that are limited  Patient noted she has a balance deficits due to inner ear dysfunction  Patient noted she will side step to keep her self upright  Patient noted if it is really cold, she will have to side step more  Patient noted her pain at least is a 5 of 10 and an 8 of 10  Patient noted she will lie down to reduce her pain secondary to sitting as pain aggravating  Patient noted she continues to limit her sitting due to pain aggravation  Patient reported her legs have decreased in endurance and stopping her fitness center activities  Impairments: abnormal gait, abnormal or restricted ROM, abnormal movement and pain with function  Understanding of Dx/Px/POC: excellent   Prognosis: good  Prognosis details: Patient is a 78y o  year old female seen for outpatient PT evaluation with pain, mobility and functional deficits due to bilateral hip and leg pain  Patient presents to PT IE with the following problems, concerns, deficits and impairments: bilateral le pain, + TTP, gait and stair dysfunctions, decrease in balance, decreased bilateral le range of motion, decreased bilateral le strength, + special tests, functional limitations and decreased tolerance to activity  Patient would benefit from skilled PT services under the following PT treatment plan to address the above noted deficits: therapeutic exercises and activities to facilitate bilateral le rom and strength, modalities, manual therapy techniques, postural reeducation and strengthening, DLS and abdominal strengthening, gait and stair training, transfer training, balance and proprioception activities, IASTM techniques, Kinesio taping techniques and a hep  Thank you for the referral      Goals  Short Term goals - 4 weeks  1  Patient will be independent HEP  2   Patient will report a 25 - 50% decrease in pain complaints    3   Increase strength 1/2 grade  4   Increase ROM 5-10 degrees  Long Term goals - 8 weeks  1  Patient will report elimination of pain complaints  2   Patient will return to all work related activities without restriction  3   Patient will return to all recreational activities without restriction  4   ROM WFL  5   Strength 5/5   6   Patient will report ambulation improved > 50 % so she denies house hold walking deficits  7   Patient will report stair climbing improved > 50 % so she can perform house hold stair climbing improved without deficits  8   Patient will exhibit balance improved > 10 % to decrease fall risk  9   Patient will report standing activities improved > 50 % so she can perform all house hold activities without deficits  10   Patient's self foto computerized functional index improved to = or > predicted levels  Plan  Patient would benefit from: skilled physical therapy  Planned modality interventions: cryotherapy, TENS, thermotherapy: hydrocollator packs and unattended electrical stimulation  Planned therapy interventions: massage, joint mobilization, manual therapy, neuromuscular re-education, balance, balance/weight bearing training, aquatic therapy, body mechanics training, patient education, postural training, self care, strengthening, stretching, therapeutic activities, therapeutic exercise, therapeutic training, compression, flexibility, functional ROM exercises, gait training, graded exercise, graded motor, graded activity and home exercise program  Frequency: 2x week  Duration in weeks: 8  Treatment plan discussed with: patient        Subjective Evaluation    History of Present Illness  Mechanism of injury: Patient's PMHx is remarkable for Asthma, HTN, Osteoporosis, sleep apnea with CPAP use and Hysterectomy      Pain  At best pain ratin  At worst pain ratin  Location: hip and leg pain    Patient Goals  Patient goals for therapy: decreased pain, increased strength, independence with ADLs/IADLs, return to sport/leisure activities, improved balance and increased motion          Objective     Tenderness     Additional Tenderness Details  Patient is + moderate to severe TTP at bilateral ITB region along entire ITB and the greater trochanter  Active Range of Motion   Left Hip   Flexion: 108 degrees with pain  Abduction: 28 degrees with pain    Right Hip   Flexion: 106 degrees with pain  Abduction: 24 degrees with pain  Left Knee   Flexion: 124 degrees with pain  Extension: -5 degrees with pain  Extensor lag: 10 degrees     Right Knee   Flexion: 128 degrees with pain  Extension: -2 degrees with pain  Extensor lag: 10 degrees   Left Ankle/Foot   Dorsiflexion (ke): 8 degrees   Plantar flexion: 44 degrees     Right Ankle/Foot   Dorsiflexion (ke): 10 degrees   Plantar flexion: 58 degrees     Strength/Myotome Testing     Left Hip   Planes of Motion   Flexion: 4  Extension: 4+  Abduction: 4  Adduction: 4+    Right Hip   Planes of Motion   Flexion: 4  Extension: 4+  Abduction: 4  Adduction: 4+    Left Knee   Flexion: 4+  Extension: 4    Right Knee   Flexion: 4+  Extension: 4    Left Ankle/Foot   Dorsiflexion: 4  Plantar flexion: 4+    Right Ankle/Foot   Dorsiflexion: 4  Plantar flexion: 4+    Tests     Left Hip   Positive Robles and piriformis  Right Hip   Positive Robles and piriformis  Ambulation     Ambulation: Level Surfaces     Additional Level Surfaces Ambulation Details  Patient ambulates with decrease in pace and decrease in bilateral le step length  Ambulation: Stairs   Ascend stairs: independent  Pattern: reciprocal  Railings: two rails  Descend stairs: independent  Pattern: reciprocal  Railings: two rails    Additional Stairs Ambulation Details  Patient will perform stair ascent with decrease in unilateral le push off with ascent  Comments   Limits of stability Testing  Skill Level: Easy  Time to complete test: 56 seconds  No upper extremity support      Overall:          Actual: 33          Goal:     65  Forward:          Actual:     29          Goal:     65   Backward:          Actual:     28          Goal:     30  Left:                       Actual:     60          Goal:     65  Right:                       Actual:     34          Goal:     65  Forward / Left:          Actual:     33          Goal:     65  Forward / Right:          Actual:     41          Goal:     65   Backward / Left:          Actual:     25          Goal:     65   Backward / Right:          Actual:     52          Goal:     65  MCTSIB: Eyes Open on Firm Surface with Patient Sway index at 1 58; Eyes Closed on Firm Surface with Patient Sway index at 1 03; Eyes Open on Foam Surface with Patient Sway index at 1 61; Eyes Closed on Foam Surface with Patient Sway index at 2 95  Precautions: Patient's PMHx is remarkable for Asthma, HTN, Osteoporosis and Hysterectomy        Manual  3/16            Bilateral lateral hip and thigh IASTM techniques               Check leg length                                                      Exercise Diary  3/16            Nu step             Standing ITB stretch:B:             Standing knee flexion stretch off second step:B:             Standing gastrocnemius stretch off step:B:             Seated LAQ:B:             Seated Hip flexion:B:             Hamstring stretch:B:             Piriformis stretch:B:             SKTC:B:             LTR:B:             slr flexion:B:             SAQ:B:             Bridges             Prone TKE:B:             Prone hip extension:B:             Prone knee flexion self stretch:B:             Prone knee flexion arom:B:             Standing slr x 3:B:             Mini squats             Lunges:B:             SLS and tandem stance:B:             Forward Step ups:B 6"            Lateral step ups:B: 6"            Step downs:B: 4"            Side stepping with and without squats at ballet bar             Tandem ambulation forward and backward at ballet bar             Opposite ue to le walking walking with CG of 1 and gait belt                          Biodex Balance System Limits of stability                              Modalities  3/16            MHP to bilateral anterior and lateral hips PRN:B

## 2020-03-16 NOTE — PROGRESS NOTES
PT Evaluation     Today's date: 3/16/2020  Patient name: Nalini Pagan  : 1950  MRN: [id number]  Referring provider: Karla Lopez MD  Dx:   Encounter Diagnosis     ICD-10-CM    1  Pain of both hip joints M25 551     M25 552                   Assessment  Assessment details: PT IE: 3-  Patient reported onset of bilateral lateral hip pain with insidious onset  Patient noted she uses Biofreeze, Accupressure, and self massage as pain reducing  Patient noted lying on either side as pain aggravating  Patient noted prolonged sitting aggravates bilateral hip pain  Patient noted she has not resumed her fitness center program   Patient noted prolonged walking does aggravate bilateral hip pain  Patient noted she can not squat down due to pain aggravation thus she will sit on the ground and do her iadls  Patient noted she had distal bilateral ankle pain that is due to bilateral le lymphadema  Patient noted she has stairs that are limited  Patient noted she has a balance deficits due to inner ear dysfunction  Patient noted she will side step to keep her self upright  Patient noted if it is really cold, she will have to side step more  Patient noted her pain at least is a 5 of 10 and an 8 of 10  Patient noted she will lie down to reduce her pain secondary to sitting as pain aggravating  Patient noted she continues to limit her sitting due to pain aggravation  Patient reported her legs have decreased in endurance and stopping her fitness center activities  Impairments: abnormal gait, abnormal or restricted ROM, abnormal movement and pain with function  Understanding of Dx/Px/POC: excellent   Prognosis: good  Prognosis details: Patient is a 78y o  year old female seen for outpatient PT evaluation with pain, mobility and functional deficits due to bilateral hip and leg pain   Patient presents to PT IE with the following problems, concerns, deficits and impairments: bilateral le pain, + TTP, gait and stair dysfunctions, decrease in balance, decreased bilateral le range of motion, decreased bilateral le strength, + special tests, functional limitations and decreased tolerance to activity  Patient would benefit from skilled PT services under the following PT treatment plan to address the above noted deficits: therapeutic exercises and activities to facilitate bilateral le rom and strength, modalities, manual therapy techniques, postural reeducation and strengthening, DLS and abdominal strengthening, gait and stair training, transfer training, balance and proprioception activities, IASTM techniques, Kinesio taping techniques and a hep  Thank you for the referral      Goals  Short Term goals - 4 weeks  1  Patient will be independent HEP  2   Patient will report a 25 - 50% decrease in pain complaints  3   Increase strength 1/2 grade  4   Increase ROM 5-10 degrees  Long Term goals - 8 weeks  1  Patient will report elimination of pain complaints  2   Patient will return to all work related activities without restriction  3   Patient will return to all recreational activities without restriction  4   ROM WFL  5   Strength 5/5   6   Patient will report ambulation improved > 50 % so she denies house hold walking deficits  7   Patient will report stair climbing improved > 50 % so she can perform house hold stair climbing improved without deficits  8   Patient will exhibit balance improved > 10 % to decrease fall risk  9   Patient will report standing activities improved > 50 % so she can perform all house hold activities without deficits  10   Patient's self foto computerized functional index improved to = or > predicted levels      Plan  Patient would benefit from: skilled physical therapy  Planned modality interventions: cryotherapy, TENS, thermotherapy: hydrocollator packs and unattended electrical stimulation  Planned therapy interventions: massage, joint mobilization, manual therapy, neuromuscular re-education, balance, balance/weight bearing training, aquatic therapy, body mechanics training, patient education, postural training, self care, strengthening, stretching, therapeutic activities, therapeutic exercise, therapeutic training, compression, flexibility, functional ROM exercises, gait training, graded exercise, graded motor, graded activity and home exercise program  Frequency: 2x week  Duration in weeks: 8  Treatment plan discussed with: patient        Subjective Evaluation    History of Present Illness  Mechanism of injury: Patient's PMHx is remarkable for Asthma, HTN, Osteoporosis, sleep apnea with CPAP use and Hysterectomy  Pain  At best pain ratin  At worst pain ratin  Location: hip and leg pain    Patient Goals  Patient goals for therapy: decreased pain, increased strength, independence with ADLs/IADLs, return to sport/leisure activities, improved balance and increased motion          Objective     Tenderness     Additional Tenderness Details  Patient is + moderate to severe TTP at bilateral ITB region along entire ITB and the greater trochanter      Active Range of Motion   Left Hip   Flexion: 108 degrees with pain  Abduction: 28 degrees with pain    Right Hip   Flexion: 106 degrees with pain  Abduction: 24 degrees with pain  Left Knee   Flexion: 124 degrees with pain  Extension: -5 degrees with pain  Extensor lag: 10 degrees     Right Knee   Flexion: 128 degrees with pain  Extension: -2 degrees with pain  Extensor lag: 10 degrees   Left Ankle/Foot   Dorsiflexion (ke): 8 degrees   Plantar flexion: 44 degrees     Right Ankle/Foot   Dorsiflexion (ke): 10 degrees   Plantar flexion: 58 degrees     Strength/Myotome Testing     Left Hip   Planes of Motion   Flexion: 4  Extension: 4+  Abduction: 4  Adduction: 4+    Right Hip   Planes of Motion   Flexion: 4  Extension: 4+  Abduction: 4  Adduction: 4+    Left Knee   Flexion: 4+  Extension: 4    Right Knee   Flexion: 4+  Extension: 4    Left Ankle/Foot   Dorsiflexion: 4  Plantar flexion: 4+    Right Ankle/Foot   Dorsiflexion: 4  Plantar flexion: 4+    Tests     Left Hip   Positive Robles and piriformis  Right Hip   Positive Robles and piriformis  Ambulation     Ambulation: Level Surfaces     Additional Level Surfaces Ambulation Details  Patient ambulates with decrease in pace and decrease in bilateral le step length  Ambulation: Stairs   Ascend stairs: independent  Pattern: reciprocal  Railings: two rails  Descend stairs: independent  Pattern: reciprocal  Railings: two rails    Additional Stairs Ambulation Details  Patient will perform stair ascent with decrease in unilateral le push off with ascent  Comments   Limits of stability Testing  Skill Level: Easy  Time to complete test: 56 seconds  No upper extremity support  Overall:          Actual:     33          Goal:     65  Forward:          Actual:     29          Goal:     65   Backward:          Actual:     28          Goal:     30  Left:                       Actual:     60          Goal:     65  Right:                       Actual:     34          Goal:     65  Forward / Left:          Actual:     33          Goal:     65  Forward / Right:          Actual:     41          Goal:     65   Backward / Left:          Actual:     25          Goal:     65   Backward / Right:          Actual:     52          Goal:     65  MCTSIB: Eyes Open on Firm Surface with Patient Sway index at 1 58; Eyes Closed on Firm Surface with Patient Sway index at 1 03; Eyes Open on Foam Surface with Patient Sway index at 1 61; Eyes Closed on Foam Surface with Patient Sway index at 2 95  Precautions: Patient's PMHx is remarkable for Asthma, HTN, Osteoporosis and Hysterectomy        Manual  3/16            Bilateral lateral hip and thigh IASTM techniques               Check leg length                                                      Exercise Diary  3/16            Nu step Standing ITB stretch:B:             Standing knee flexion stretch off second step:B:             Standing gastrocnemius stretch off step:B:             Seated LAQ:B:             Seated Hip flexion:B:             Hamstring stretch:B:             Piriformis stretch:B:             SKTC:B:             LTR:B:             slr flexion:B:             SAQ:B:             Bridges             Prone TKE:B:             Prone hip extension:B:             Prone knee flexion self stretch:B:             Prone knee flexion arom:B:             Standing slr x 3:B:             Mini squats             Lunges:B:             SLS and tandem stance:B:             Forward Step ups:B 6"            Lateral step ups:B: 6"            Step downs:B: 4"            Side stepping with and without squats at ballet bar             Tandem ambulation forward and backward at ballet bar             Opposite ue to le walking walking with CG of 1 and gait belt                          Biodex Balance System Limits of stability                              Modalities  3/16            MHP to bilateral anterior and lateral hips PRN:B

## 2020-03-18 ENCOUNTER — APPOINTMENT (OUTPATIENT)
Dept: PHYSICAL THERAPY | Age: 70
End: 2020-03-18
Payer: MEDICARE

## 2020-03-23 ENCOUNTER — APPOINTMENT (OUTPATIENT)
Dept: PHYSICAL THERAPY | Age: 70
End: 2020-03-23
Payer: MEDICARE

## 2020-03-30 ENCOUNTER — APPOINTMENT (OUTPATIENT)
Dept: PHYSICAL THERAPY | Age: 70
End: 2020-03-30
Payer: MEDICARE

## 2020-03-31 ENCOUNTER — TRANSCRIBE ORDERS (OUTPATIENT)
Dept: PHYSICAL THERAPY | Age: 70
End: 2020-03-31

## 2020-07-01 ENCOUNTER — EVALUATION (OUTPATIENT)
Dept: PHYSICAL THERAPY | Age: 70
End: 2020-07-01
Payer: MEDICARE

## 2020-07-01 DIAGNOSIS — R42 VERTIGO: ICD-10-CM

## 2020-07-01 DIAGNOSIS — R26.9 ABNORMALITY OF GAIT: Primary | ICD-10-CM

## 2020-07-01 PROCEDURE — 97162 PT EVAL MOD COMPLEX 30 MIN: CPT

## 2020-07-01 PROCEDURE — 97140 MANUAL THERAPY 1/> REGIONS: CPT

## 2020-07-02 ENCOUNTER — APPOINTMENT (OUTPATIENT)
Dept: PHYSICAL THERAPY | Age: 70
End: 2020-07-02
Payer: MEDICARE

## 2020-07-02 NOTE — PROGRESS NOTES
PT Evaluation     Today's date: 2020  Patient name: Claudia Vee  : 1950  MRN: [id number]  Referring provider: Daphney Gomes MD  Dx:   Encounter Diagnosis     ICD-10-CM    1  Abnormality of gait R26 9    2   Vertigo R42                   Assessment/Plan    Subjective Evaluation    History of Present Illness  Date of onset: 2020 (exacerbation of vertigo, intermittent vertigo since 2020)          Recurrent probem    Quality of life: good    Pain  Current pain ratin  At best pain ratin  At worst pain ratin  Location: cervical pain right greater than left sided  Quality: dull ache, tight, sharp and pulling  Relieving factors: change in position, heat and rest  Aggravating factors: overhead activity and walking (worse with movement)  Progression: no change    Social Support  Lives with: spouse    Employment status: working (owns own business  full time )  Exercise history: enjoys walking     Treatments  Previous treatment: physical therapy  Current treatment: physical therapy  Patient Goals  Patient goals for therapy: independence with ADLs/IADLs, return to sport/leisure activities, return to work, improved balance and decreased pain  Patient goal: reduction of vertigo by 80 percent in 4 weeks         Objective    Flowsheet Rows      Most Recent Value   PT/OT G-Codes   Current Score  60   Projected Score  70   FOTO information reviewed  Yes   Assessment Type  Evaluation   G code set  Mobility: Walking & Moving Around   Mobility: Walking and Moving Around Current Status ()  CL   Mobility: Walking and Moving Around Goal Status ()  CK             Precautions: allergies, nabumentone, sulfa antibiotics, other,       Manuals 20             I eval             Right epley man            Retest alan hallpike              biodex balance testing and training              Neuro Re-Ed             Ankle sway referencing flat and foam eo, ec             Foam head turns vertical , horizontal eo, ec             Ball pass activities in future              Squats watch hip pain on left             Tandem gait             Tandem stance                          Ther Ex                                                                                                                     Ther Activity                                       Gait Training                                       Modalities

## 2020-07-08 ENCOUNTER — OFFICE VISIT (OUTPATIENT)
Dept: PHYSICAL THERAPY | Age: 70
End: 2020-07-08
Payer: MEDICARE

## 2020-07-08 DIAGNOSIS — R26.9 ABNORMALITY OF GAIT: Primary | ICD-10-CM

## 2020-07-08 PROCEDURE — 97112 NEUROMUSCULAR REEDUCATION: CPT

## 2020-07-08 NOTE — PROGRESS NOTES
Daily Note     Today's date: 2020  Patient name: Maria D Peters  : 1950  MRN: [id number]  Referring provider: Sapphire Ko MD  Dx:   Encounter Diagnosis     ICD-10-CM    1  Abnormality of gait R26 9                   Subjective: Pt  Denies dizziness just a slight "whoosh" with forward bending  Objective: See treatment diary below      Assessment: Tolerated treatment well  Patient would benefit from continued PT      Plan: Continue per plan of care        Precautions: allergies, nabumentone, sulfa antibiotics, other,       Manuals 20            I eval             Right epley man            Retest alan hallpike              biodex balance testing and training              Neuro Re-Ed             Ankle sway referencing flat and foam eo, ec  yes           Foam head turns vertical , horizontal eo, ec  yes           Ball pass activities in future   yes           Squats watch hip pain on left  30x           Tandem gait  50ft           Tandem stance  20sec x 5                        Ther Ex                                                                                                                     Ther Activity                                       Gait Training                                       Modalities

## 2020-07-15 ENCOUNTER — OFFICE VISIT (OUTPATIENT)
Dept: PHYSICAL THERAPY | Age: 70
End: 2020-07-15
Payer: MEDICARE

## 2020-07-15 DIAGNOSIS — R26.9 ABNORMALITY OF GAIT: Primary | ICD-10-CM

## 2020-07-15 PROCEDURE — 97110 THERAPEUTIC EXERCISES: CPT

## 2020-07-15 NOTE — PROGRESS NOTES
Daily Note     Today's date: 7/15/2020  Patient name: Adrienne Chau  : 1950  MRN: [id number]  Referring provider: Pawel Gama MD  Dx:   Encounter Diagnosis     ICD-10-CM    1  Abnormality of gait R26 9                   Subjective: Pt  Denies dizziness with all ADL's  Pt  Will have one more visit then DC to hep  Objective: See treatment diary below      Assessment: Tolerated treatment well  Patient would benefit from continued PT      Plan: Continue per plan of care        Precautions: allergies, nabumentone, sulfa antibiotics, other,       Manuals 7/1/20 7/8 7/15           I eval             Right epley man            Retest alan hallpike              biodex balance testing and training    yes          Neuro Re-Ed             Ankle sway referencing flat and foam eo, ec  yes yes          Foam head turns vertical , horizontal eo, ec  yes yes          Ball pass activities in future   yes yes          Squats watch hip pain on left  30x 30x          Tandem gait  50ft 50ft          Tandem stance  20sec x 5 20sec x 5          t-mill eye/head   10 min          Ther Ex   yes          Ball pass                                                                                                        Ther Activity                                       Gait Training                                       Modalities

## 2020-07-22 ENCOUNTER — OFFICE VISIT (OUTPATIENT)
Dept: PHYSICAL THERAPY | Age: 70
End: 2020-07-22
Payer: MEDICARE

## 2020-07-22 DIAGNOSIS — R42 VERTIGO: ICD-10-CM

## 2020-07-22 DIAGNOSIS — R26.9 ABNORMALITY OF GAIT: Primary | ICD-10-CM

## 2020-07-22 PROCEDURE — 97140 MANUAL THERAPY 1/> REGIONS: CPT

## 2020-07-22 PROCEDURE — 97112 NEUROMUSCULAR REEDUCATION: CPT

## 2020-07-23 NOTE — PROGRESS NOTES
Daily Note     Today's date: 2020  Patient name: Luis Armando Britton  : 1950  MRN: [id number]  Referring provider: Catherine Regan MD  Dx:   Encounter Diagnosis     ICD-10-CM    1  Abnormality of gait R26 9    2  Vertigo R42                   Subjective:"i don't have any vertigo , and i've tested it with all kinds of movements and bending  "      Objective: See treatment diary below      Assessment: Tolerated treatment well  Patient would benefit from continued PT to address bilateral trochanteric bursitis  Pt post mri will bring in new prescription to address this chronic condition  In the near future   Set goals achieved for vertigo at this time with pt instructed in self right epley maneuver as needed  Plan: discharge of PT for vertigo at this time  Set goals achieved        Precautions: allergies, nabumentone, sulfa antibiotics, other,       Manuals 7/1/20 7/8 7/15 7/22          I eval             Right epley man   man         Retest alan hallpike     man         biodex balance testing and training    yes          Neuro Re-Ed             Ankle sway referencing flat and foam eo, ec  yes yes 10 reps each         Foam head turns vertical , horizontal eo, ec  yes yes 10 reps          Ball pass activities in future   yes yes          Squats watch hip pain on left  30x 30x 3 x 10         Tandem gait  50ft 50ft  1min x 1         Tandem stance  20sec x 5 20sec x 5 30 se c x 3         t-mill eye/head   10 min          Ther Ex   yes                                                                                                                  Ther Activity                                       Gait Training                                       Modalities

## 2020-08-12 ENCOUNTER — TELEPHONE (OUTPATIENT)
Dept: NEPHROLOGY | Facility: CLINIC | Age: 70
End: 2020-08-12

## 2020-08-12 NOTE — TELEPHONE ENCOUNTER
Left a message to schedule pt's follow up with Dr Lamont Gonzalez if its ok with the pt they can also be scheduled with Bigg Hollis or Cuco Wiley

## 2020-08-19 ENCOUNTER — EVALUATION (OUTPATIENT)
Dept: PHYSICAL THERAPY | Age: 70
End: 2020-08-19
Payer: MEDICARE

## 2020-08-19 DIAGNOSIS — M70.62 TROCHANTERIC BURSITIS OF BOTH HIPS: Primary | ICD-10-CM

## 2020-08-19 DIAGNOSIS — M70.61 TROCHANTERIC BURSITIS OF BOTH HIPS: Primary | ICD-10-CM

## 2020-08-19 PROCEDURE — 97162 PT EVAL MOD COMPLEX 30 MIN: CPT | Performed by: PHYSICAL THERAPIST

## 2020-08-19 PROCEDURE — 97140 MANUAL THERAPY 1/> REGIONS: CPT | Performed by: PHYSICAL THERAPIST

## 2020-08-19 NOTE — PROGRESS NOTES
PT Evaluation     Today's date: 2020  Patient name: Fuad Meyer  : 1950  MRN: [id number]  Referring provider: Dolly Chambers MD  Dx:   Encounter Diagnosis     ICD-10-CM    1  Trochanteric bursitis of both hips  M70 61     M70 62                   Assessment  Assessment details: PT IE: 2020  Patient noted long standing bilateral hip pain persists which limits sleep due to side lying position on either side waking her up  Patient noted she is also unable to resume recreational activities  Patient was scheduled for PT and COVID-19 pandemic occurred  Patient noted her balance remains limited still  Patient noted bilateral hips remain weak  Patient noted walking and stair climbing is limited by bilateral hip pain  Patient noted squatting is also limited by bilateral hip pain aggravation with functional activities like cleaning up the floor  Patient noted use of manual therapy techniques, supine lying and stretching and pool based swimming is helpful in pain reduction of bilateral hip pain  Patient noted prolonged static standing and sitting aggravates her pain  Patient noted bilateral hip pain at least is a 5 of 10 and at worst at 9 of 10  Patient noted lying on either side feels as if she is lying on "rocks"  Patient noted she will tightness into distal right lateral ITB at knee  Patient denies any recent falls  Patient noted her balance is improved since treated for vertigo by PT  Patient denies bilateral hip diagnostic testing  Patient noted she wakes in the morning with right SI joint region pain that radiates into the left low back as well as right groin pain        Impairments: abnormal gait, abnormal or restricted ROM, abnormal movement, activity intolerance, lacks appropriate home exercise program, pain with function and poor body mechanics  Understanding of Dx/Px/POC: good   Prognosis: good  Prognosis details: Patient is a 69y o  year old female seen for outpatient PT evaluation with pain, mobility and functional deficits due to bilateral hip trochanteric bursitis  Patient presents to PT IE with the following problems, concerns, deficits and impairments: bilateral lateral hip and distal ITB region pain, decreased bilateral le range of motion, decreased bilateral le strength, + TTP, gait and stair dysfunctions, decrease in balance, functional limitations and decreased tolerance to activity  Patient would benefit from skilled PT services under the following PT treatment plan to address the above noted deficits: therapeutic exercises and activities to facilitate bilateral le rom and strength, gait and stair training, balance and proprioception activities, modalities, manual therapy techniques, kinesio taping techniques, IASTM techniques and a hep  Thank you for the referral      Goals  Short Term goals - 4 weeks  1  Patient will be independent HEP  2   Patient will report a 25 - 50% decrease in pain complaints  3   Increase strength 1/2 grade  4   Increase ROM 5-10 degrees  Long Term goals - 8 weeks  1  Patient will report elimination of pain complaints  2  Patient will return to all recreational activities without restriction  3   ROM WFL  4   Strength 5/5   5   Patient will report Sleep improved by > 60 minutes due to increase in side lying tolerance  6   Patient will report being able to lie on either Side lying position without pain aggravation  7   Patient will report Walking improved by > 50 % so she can resume walking with for fitness activities  8   Patient will report Stair climbing improve to reciprocal pattern with both ascent and descent  9   Patient will exhibit Balance improvement by > 25 %  10   Patient will exhibit Squatting improved during functional activities of picking up items off the floor    11   Patient will report Prolonged sitting and standing based functional activities improved so she lacks functional deficits  Plan  Patient would benefit from: skilled physical therapy  Planned modality interventions: cryotherapy, TENS, thermotherapy: hydrocollator packs and unattended electrical stimulation  Planned therapy interventions: manual therapy, massage, joint mobilization, aquatic therapy, balance, balance/weight bearing training, neuromuscular re-education, patient education, postural training, body mechanics training, self care, compression, strengthening, stretching, therapeutic activities, therapeutic exercise, therapeutic training, flexibility, functional ROM exercises, gait training, graded activity, graded exercise, graded motor and home exercise program  Frequency: 2x week  Duration in weeks: 8  Treatment plan discussed with: patient        Subjective Evaluation    History of Present Illness  Mechanism of injury: Patient's PMHx is remarkable for Vertigo, Asthma, HTN, Osteoporosis and Hysterectomy  Pain  At best pain ratin  At worst pain ratin  Location: Bilateral hip and ITB    Patient Goals  Patient goals for therapy: decreased pain, improved balance, increased motion, increased strength and return to sport/leisure activities  Patient goal: Patient's goal is to walk without pain"  Objective     Tenderness     Additional Tenderness Details  Patient is + moderate to severe TTP at bilateral greater trochanter region and distal ITB      Active Range of Motion   Left Hip   Flexion: 116 degrees with pain  Abduction: 30 degrees     Right Hip   Flexion: 112 degrees with pain  Abduction: 30 degrees   Left Knee   Flexion: 132 degrees   Extension: -5 degrees   Extensor la degrees     Right Knee   Flexion: 135 degrees   Extension: -5 degrees   Extensor la degrees     Strength/Myotome Testing     Left Hip   Planes of Motion   Flexion: 4  Extension: 4  Abduction: 4+  Adduction: 5  External rotation: 4-  Internal rotation: 4    Right Hip   Planes of Motion   Flexion: 4+  Extension: 4+  Abduction: 4+  Adduction: 5  External rotation: 4-  Internal rotation: 4-    Left Knee   Flexion: 4  Extension: 4    Right Knee   Flexion: 4+  Extension: 5    Left Ankle/Foot   Dorsiflexion: 5    Right Ankle/Foot   Dorsiflexion: 5    Ambulation     Ambulation: Level Surfaces   Ambulation without assistive device: independent    Additional Level Surfaces Ambulation Details  Patient ambulates with compensated bilateral trendelenburg gait deviation  Ambulation: Stairs   Ascend stairs: independent  Pattern: non-reciprocal  Railings: two rails  Descend stairs: independent  Pattern: non-reciprocal  Railings: two rails    Comments   PT IE: 08/19/2020  TUG is at 10 21 seconds  Limits of Stability testing  No UE support  Skill level: Easy  Time to Complete level:  37  Seconds  Overall:            Actual:     51       Goal:    65  Forward:           Actual:     66      Goal:    65   Backward:        Actual:     82      Goal:    30  Left:                   Actual:     55       Goal:    65  Right:                    Actual:     72      Goal:    65  Forward / Left:        Actual:     53      Goal:    65  Forward / Right:            Actual:     47       Goal:    65   Backward / Left:           Actual:     63      Goal:    65   Backward / Right:        Actual:     59      Goal:    65       Limits of Stability testing  No UE support  Skill level: Moderate  Time to Complete level:  55  Seconds  Overall:            Actual:     50       Goal:    65  Forward:           Actual:     76      Goal:    65   Backward:        Actual:     31      Goal:    30  Left:                   Actual:     62       Goal:    65  Right:                    Actual:     80     Goal:    65  Forward / Left:        Actual:     46      Goal:    65  Forward / Right:            Actual:     52       Goal:    65   Backward / Left:           Actual:     37      Goal:    65   Backward / Right:        Actual:     49      Goal:    65  Flowsheet Rows      Most Recent Value   PT/OT G-Codes   Current Score  49   Projected Score  60             Precautions: Patient's PMHx is remarkable for Asthma, HTN, Osteoporosis and Hysterectomy        Manuals 8/19            IASTM techniques to bilateral ITB and lateral hips in side lying 10 min                                                   Neuro Re-Ed                                                                                                        Ther Ex             Nu step or bike             Standing ITB stretch:B:             Standing hip flexor stretch:B:             LAQ:B:             Hip flexion:B:             Hamstring stretch forward and diagonal:B:             piriformis stretch:B:             LTR:B:             DLS abdominal bracing:B:             DLS abdominal bracing with slr flexion:B:             DLS abdominal bracing with hip flexion:B:             Bridges             Prone lying             Prone press ups:B:             Prone hip IR and ER:B:             Prone hip extension:B:                          Mini squats with DLS:B:             Lunges with DLS:B:             Forward step ups:B:             Lateral step ups:B:             Step downs:B:             Hip hiking:B:                          SLS and Tandem stance:B:             Tandem ambulation and side stepping in parallel bars or ballet bar with foam                           [employer] balance system limits of stability                                       Ther Activity                                       Gait Training                                       Modalities             MHP to bilateral hips seated or supine

## 2020-08-19 NOTE — LETTER
October 15, 2020    Dalia Morrow MD  [address]    Patient: Adrienne Chau   YOB: 1950   Date of Visit: 2020     Encounter Diagnosis     ICD-10-CM    1  Trochanteric bursitis of both hips  M70 61     M70 62        Dear Dr Rasmussen Bones: Thank you for your recent referral of Adrienne Chau  Please review the attached evaluation summary from [facility] recent visit  Please verify that you agree with the plan of care by signing the attached order  If you have any questions or concerns, please do not hesitate to call  I sincerely appreciate the opportunity to share in the care of one of your patients and hope to have another opportunity to work with you in the near future  Sincerely,    Mata Gracia, PT      Referring Provider:      I certify that I have read the below Plan of Care and certify the need for these services furnished under this plan of treatment while under my care  Dalia Morrow MD  [address] [place] [address] Road: [phone]          PT Evaluation     Today's date: 2020  Patient name: Adrienne Chau  : 1950  MRN: [id number]  Referring provider: Pawel Gama MD  Dx:   Encounter Diagnosis     ICD-10-CM    1  Trochanteric bursitis of both hips  M70 61     M70 62                   Assessment  Assessment details: PT IE: 2020  Patient noted long standing bilateral hip pain persists which limits sleep due to side lying position on either side waking her up  Patient noted she is also unable to resume recreational activities  Patient was scheduled for PT and COVID-19 pandemic occurred  Patient noted her balance remains limited still  Patient noted bilateral hips remain weak  Patient noted walking and stair climbing is limited by bilateral hip pain    Patient noted squatting is also limited by bilateral hip pain aggravation with functional activities like cleaning up the floor  Patient noted use of manual therapy techniques, supine lying and stretching and pool based swimming is helpful in pain reduction of bilateral hip pain  Patient noted prolonged static standing and sitting aggravates her pain  Patient noted bilateral hip pain at least is a 5 of 10 and at worst at 9 of 10  Patient noted lying on either side feels as if she is lying on "rocks"  Patient noted she will tightness into distal right lateral ITB at knee  Patient denies any recent falls  Patient noted her balance is improved since treated for vertigo by PT  Patient denies bilateral hip diagnostic testing  Patient noted she wakes in the morning with right SI joint region pain that radiates into the left low back as well as right groin pain  Impairments: abnormal gait, abnormal or restricted ROM, abnormal movement, activity intolerance, lacks appropriate home exercise program, pain with function and poor body mechanics  Understanding of Dx/Px/POC: good   Prognosis: good  Prognosis details: Patient is a 69y o  year old female seen for outpatient PT evaluation with pain, mobility and functional deficits due to bilateral hip trochanteric bursitis  Patient presents to PT IE with the following problems, concerns, deficits and impairments: bilateral lateral hip and distal ITB region pain, decreased bilateral le range of motion, decreased bilateral le strength, + TTP, gait and stair dysfunctions, decrease in balance, functional limitations and decreased tolerance to activity  Patient would benefit from skilled PT services under the following PT treatment plan to address the above noted deficits: therapeutic exercises and activities to facilitate bilateral le rom and strength, gait and stair training, balance and proprioception activities, modalities, manual therapy techniques, kinesio taping techniques, IASTM techniques and a hep    Thank you for the referral  Goals  Short Term goals - 4 weeks  1  Patient will be independent HEP  2   Patient will report a 25 - 50% decrease in pain complaints  3   Increase strength 1/2 grade  4   Increase ROM 5-10 degrees  Long Term goals - 8 weeks  1  Patient will report elimination of pain complaints  2  Patient will return to all recreational activities without restriction  3   ROM WFL  4   Strength 5/5   5   Patient will report Sleep improved by > 60 minutes due to increase in side lying tolerance  6   Patient will report being able to lie on either Side lying position without pain aggravation  7   Patient will report Walking improved by > 50 % so she can resume walking with for fitness activities  8   Patient will report Stair climbing improve to reciprocal pattern with both ascent and descent  9   Patient will exhibit Balance improvement by > 25 %  10   Patient will exhibit Squatting improved during functional activities of picking up items off the floor  11   Patient will report Prolonged sitting and standing based functional activities improved so she lacks functional deficits      Plan  Patient would benefit from: skilled physical therapy  Planned modality interventions: cryotherapy, TENS, thermotherapy: hydrocollator packs and unattended electrical stimulation  Planned therapy interventions: manual therapy, massage, joint mobilization, aquatic therapy, balance, balance/weight bearing training, neuromuscular re-education, patient education, postural training, body mechanics training, self care, compression, strengthening, stretching, therapeutic activities, therapeutic exercise, therapeutic training, flexibility, functional ROM exercises, gait training, graded activity, graded exercise, graded motor and home exercise program  Frequency: 2x week  Duration in weeks: 8  Treatment plan discussed with: patient        Subjective Evaluation    History of Present Illness  Mechanism of injury: Patient's PMHx is remarkable for Vertigo, Asthma, HTN, Osteoporosis and Hysterectomy  Pain  At best pain ratin  At worst pain ratin  Location: Bilateral hip and ITB    Patient Goals  Patient goals for therapy: decreased pain, improved balance, increased motion, increased strength and return to sport/leisure activities  Patient goal: Patient's goal is to walk without pain"  Objective     Tenderness     Additional Tenderness Details  Patient is + moderate to severe TTP at bilateral greater trochanter region and distal ITB  Active Range of Motion   Left Hip   Flexion: 116 degrees with pain  Abduction: 30 degrees     Right Hip   Flexion: 112 degrees with pain  Abduction: 30 degrees   Left Knee   Flexion: 132 degrees   Extension: -5 degrees   Extensor la degrees     Right Knee   Flexion: 135 degrees   Extension: -5 degrees   Extensor la degrees     Strength/Myotome Testing     Left Hip   Planes of Motion   Flexion: 4  Extension: 4  Abduction: 4+  Adduction: 5  External rotation: 4-  Internal rotation: 4    Right Hip   Planes of Motion   Flexion: 4+  Extension: 4+  Abduction: 4+  Adduction: 5  External rotation: 4-  Internal rotation: 4-    Left Knee   Flexion: 4  Extension: 4    Right Knee   Flexion: 4+  Extension: 5    Left Ankle/Foot   Dorsiflexion: 5    Right Ankle/Foot   Dorsiflexion: 5    Ambulation     Ambulation: Level Surfaces   Ambulation without assistive device: independent    Additional Level Surfaces Ambulation Details  Patient ambulates with compensated bilateral trendelenburg gait deviation  Ambulation: Stairs   Ascend stairs: independent  Pattern: non-reciprocal  Railings: two rails  Descend stairs: independent  Pattern: non-reciprocal  Railings: two rails    Comments   PT IE: 2020  TUG is at 10 21 seconds  Limits of Stability testing  No UE support  Skill level: Easy  Time to Complete level:  37  Seconds  Overall:            Actual:     51       Goal:    65    Forward: Actual:     66      Goal:    65   Backward:        Actual:     82      Goal:    30  Left:                   Actual:     55       Goal:    65  Right:                    Actual:     72      Goal:    65  Forward / Left:        Actual:     53      Goal:    65  Forward / Right:            Actual:     47       Goal:    65   Backward / Left:           Actual:     63      Goal:    65   Backward / Right:        Actual:     59      Goal:    65       Limits of Stability testing  No UE support  Skill level: Moderate  Time to Complete level:  55  Seconds  Overall:            Actual:     50       Goal:    65  Forward:           Actual:     76      Goal:    65   Backward:        Actual:     31      Goal:    30  Left:                   Actual:     62       Goal:    65  Right:                    Actual:     80     Goal:    65  Forward / Left:        Actual:     46      Goal:    65  Forward / Right:            Actual:     52       Goal:    65   Backward / Left:           Actual:     37      Goal:    65   Backward / Right:        Actual:     49      Goal:    65         Flowsheet Rows      Most Recent Value   PT/OT G-Codes   Current Score  49   Projected Score  60             Precautions: Patient's PMHx is remarkable for Asthma, HTN, Osteoporosis and Hysterectomy        Manuals 8/19            IASTM techniques to bilateral ITB and lateral hips in side lying 10 min                                                   Neuro Re-Ed                                                                                                        Ther Ex             Nu step or bike             Standing ITB stretch:B:             Standing hip flexor stretch:B:             LAQ:B:             Hip flexion:B:             Hamstring stretch forward and diagonal:B:             piriformis stretch:B:             LTR:B:             DLS abdominal bracing:B:             DLS abdominal bracing with slr flexion:B:             DLS abdominal bracing with hip flexion:B:             Bridges             Prone lying             Prone press ups:B:             Prone hip IR and ER:B:             Prone hip extension:B:                          Mini squats with DLS:B:             Lunges with DLS:B:             Forward step ups:B:             Lateral step ups:B:             Step downs:B:             Hip hiking:B:                          SLS and Tandem stance:B:             Tandem ambulation and side stepping in parallel bars or ballet bar with foam                           [employer] balance system limits of stability                                       Ther Activity                                       Gait Training                                       Modalities             MHP to bilateral hips seated or supine

## 2020-08-21 ENCOUNTER — OFFICE VISIT (OUTPATIENT)
Dept: PHYSICAL THERAPY | Age: 70
End: 2020-08-21
Payer: MEDICARE

## 2020-08-21 DIAGNOSIS — M70.62 TROCHANTERIC BURSITIS OF BOTH HIPS: Primary | ICD-10-CM

## 2020-08-21 DIAGNOSIS — M70.61 TROCHANTERIC BURSITIS OF BOTH HIPS: Primary | ICD-10-CM

## 2020-08-21 PROCEDURE — 97140 MANUAL THERAPY 1/> REGIONS: CPT

## 2020-08-21 PROCEDURE — 97110 THERAPEUTIC EXERCISES: CPT

## 2020-08-21 NOTE — PROGRESS NOTES
Daily Note     Today's date: 2020  Patient name: Rose Delgado  : 1950  MRN: [id number]  Referring provider: Liliam Arboleda MD  Dx:   Encounter Diagnosis     ICD-10-CM    1  Trochanteric bursitis of both hips  M70 61     M70 62                   Subjective: Pt reported 10 B hip pain today       Objective: See treatment diary below      Assessment: Pain with man work, progress as able  Plan: Cont with plan of care      Precautions: Patient's PMHx is remarkable for Asthma, HTN, Osteoporosis and Hysterectomy        Manuals            IASTM techniques to bilateral ITB and lateral hips in side lying 10 min 10 MIN                                                   Ther Ex             Nu step or bike 10 MIN  10 MIN            Standing ITB stretch:B:  NT           Standing hip flexor stretch:B:  NT           LAQ:B:             Hip flexion:B:             Hamstring stretch forward and diagonal:B:  20SEC 5X            piriformis stretch:B:  20SEC 5X           LTR:B:  20X 3SEC            DLS abdominal bracing:B:  30X 3SEC            DLS abdominal bracing with slr flexion:B:  NT           DLS abdominal bracing with hip flexion:B:  NT           Bridges  20X 2SEC            Prone lying  3 MIN            Prone press ups:B:  20X            Prone hip IR and ER:B:  20x            Prone hip extension:B:  NT                        Mini squats with DLS:B:  NT           Lunges with DLS:B:  NT           Forward step ups:B:  NT           Lateral step ups:B:  NT           Step downs:B:  NT           Hip hiking:B:  NT                        SLS and Tandem stance:B:  NT           Tandem ambulation and side stepping in parallel bars or ballet bar with foam   NT                        [employer] balance system limits of stability  NT                                     Modalities             MHP to bilateral hips seated or supine  12 MIN

## 2020-08-25 ENCOUNTER — OFFICE VISIT (OUTPATIENT)
Dept: PHYSICAL THERAPY | Age: 70
End: 2020-08-25
Payer: MEDICARE

## 2020-08-25 DIAGNOSIS — M70.62 TROCHANTERIC BURSITIS OF BOTH HIPS: Primary | ICD-10-CM

## 2020-08-25 DIAGNOSIS — M70.61 TROCHANTERIC BURSITIS OF BOTH HIPS: Primary | ICD-10-CM

## 2020-08-25 DIAGNOSIS — R26.9 ABNORMALITY OF GAIT: ICD-10-CM

## 2020-08-25 PROCEDURE — 97110 THERAPEUTIC EXERCISES: CPT

## 2020-08-25 PROCEDURE — 97140 MANUAL THERAPY 1/> REGIONS: CPT

## 2020-08-25 NOTE — PROGRESS NOTES
Daily Note     Today's date: 2020  Patient name: Maurine Burkitt  : 1950  MRN: [id number]  Referring provider: Tabitha Velásquez MD  Dx:   Encounter Diagnosis     ICD-10-CM    1  Trochanteric bursitis of both hips  M70 61     M70 62    2  Abnormality of gait  R26 9                   Subjective: Pt reported 6/10 pain R> L hip today       Objective: See treatment diary below      Assessment: Pain with man work, Tender areas at both ITB and hamstrings       Plan: Cont with plan of care      Precautions: Patient's PMHx is remarkable for Asthma, HTN, Osteoporosis and Hysterectomy        Manuals           IASTM techniques to bilateral ITB and lateral hips in side lying 10 min 10 MIN  10 min                                                  Ther Ex             Nu step or bike 10 MIN  10 MIN  10 min           Standing ITB stretch:B:  NT NT          Standing hip flexor stretch:B:  NT NT          LAQ:B:   NT          Hip flexion:B:             Hamstring stretch forward and diagonal:B:  20SEC 5X  20SEC 5X           piriformis stretch:B:  20SEC 5X 20SEC 5X          LTR:B:  20X 3SEC  20X 3SEC           DLS abdominal bracing:B:  30X 3SEC  30X 3SEC           DLS abdominal bracing with slr flexion:B:  NT 20X 2#           DLS abdominal bracing with hip flexion:B:  NT 20X 2#           Bridges  20X 2SEC  20X 3SEC           Prone lying  3 MIN  3 MIN           Prone press ups:B:  20X            Prone hip IR and ER:B:  20x  20x 2#          Prone hip extension:B:  NT NT          SLRX3    20X 2#           Mini squats with DLS:B:  NT NT          Lunges with DLS:B:  NT NT          Forward step ups:B:  NT NT          Lateral step ups:B:  NT NT          Step downs:B:  NT NT          Hip hiking:B:  NT NT                       SLS and Tandem stance:B:  NT NT          Tandem ambulation and side stepping in parallel bars or ballet bar with foam   NT NT                       [employer] balance system limits of stability NT NT                                    Modalities             MHP to bilateral hips seated or supine  12 MIN  12 MIN

## 2020-08-28 ENCOUNTER — OFFICE VISIT (OUTPATIENT)
Dept: PHYSICAL THERAPY | Age: 70
End: 2020-08-28
Payer: MEDICARE

## 2020-08-28 DIAGNOSIS — R42 VERTIGO: ICD-10-CM

## 2020-08-28 DIAGNOSIS — M70.61 TROCHANTERIC BURSITIS OF BOTH HIPS: Primary | ICD-10-CM

## 2020-08-28 DIAGNOSIS — R26.9 ABNORMALITY OF GAIT: ICD-10-CM

## 2020-08-28 DIAGNOSIS — M70.62 TROCHANTERIC BURSITIS OF BOTH HIPS: Primary | ICD-10-CM

## 2020-08-28 PROCEDURE — 97110 THERAPEUTIC EXERCISES: CPT

## 2020-08-28 PROCEDURE — 97140 MANUAL THERAPY 1/> REGIONS: CPT

## 2020-08-28 NOTE — PROGRESS NOTES
Daily Note     Today's date: 2020  Patient name: Bailey Fox  : 1950  MRN: [id number]  Referring provider: Jai Mac MD  Dx:   Encounter Diagnosis     ICD-10-CM    1  Trochanteric bursitis of both hips  M70 61     M70 62    2  Abnormality of gait  R26 9    3  Vertigo  R42        Start Time: 1130  Stop Time: 1230  Total time in clinic (min): 60 minutes    Subjective: Pt reported that B hip pain has improved since initiating PT and using beach towel for a lumbar roll while sleeping  Objective: See treatment diary below      Assessment: Pt educated in ways to manage LB, B hips and piriformis symptoms at home  Decreased B hip pain after man work  Plan: Cont with plan of care      Precautions: Patient's PMHx is remarkable for Asthma, HTN, Osteoporosis and Hysterectomy        Manuals          IASTM techniques to bilateral ITB and lateral hips in side lying    And piriformis man work on R  10 min 10 MIN  10 min  10 min                                                 Ther Ex             Nu step or bike 10 MIN  10 MIN  10 min  10 min          Standing ITB stretch:B:  NT NT NT         Standing hip flexor stretch:B:  NT NT NT         LAQ:B:   NT NT         Hip flexion:B:             Hamstring stretch forward and diagonal:B:  20SEC 5X  20SEC 5X  20SEC 5X          piriformis stretch:B:  20SEC 5X 20SEC 5X 20SEC 5X         LTR:B:  20X 3SEC  20X 3SEC  20X 3SEC          DLS abdominal bracing:B:  30X 3SEC  30X 3SEC  30X 2SEC          DLS abdominal bracing with slr flexion:B:  NT 20X 2#  20X          DLS abdominal bracing with hip flexion:B:  NT 20X 2#  20X 3SEC          Bridges  20X 2SEC  20X 3SEC  30X 3SEC          Prone lying  3 MIN  3 MIN  3 MIN          Prone press ups:B:  20X   20X          Prone hip IR and ER:B:  20x  20x 2# NT         Prone hip extension:B:  NT NT NT         SLRX3    20X 2#  20X          Mini squats with DLS:B:  NT NT 41D          Lunges with DLS:B:  NT NT NT         Forward step ups:B:  NT NT NT         Lateral step ups:B:  NT NT NT         Step downs:B:  NT NT NT         Hip hiking:B:  NT NT NT                      SLS and Tandem stance:B:  NT NT NT         Tandem ambulation and side stepping in parallel bars or ballet bar with foam   NT NT NT                      Adhysteria balance system limits of stability  NT NT NT                                   Modalities             MHP to bilateral hips seated or supine  12 MIN  12 MIN  NT

## 2020-09-01 ENCOUNTER — OFFICE VISIT (OUTPATIENT)
Dept: PHYSICAL THERAPY | Age: 70
End: 2020-09-01
Payer: MEDICARE

## 2020-09-01 DIAGNOSIS — R26.9 ABNORMALITY OF GAIT: ICD-10-CM

## 2020-09-01 DIAGNOSIS — M70.62 TROCHANTERIC BURSITIS OF BOTH HIPS: Primary | ICD-10-CM

## 2020-09-01 DIAGNOSIS — R42 VERTIGO: ICD-10-CM

## 2020-09-01 DIAGNOSIS — M70.61 TROCHANTERIC BURSITIS OF BOTH HIPS: Primary | ICD-10-CM

## 2020-09-01 PROCEDURE — 97110 THERAPEUTIC EXERCISES: CPT

## 2020-09-01 PROCEDURE — 97140 MANUAL THERAPY 1/> REGIONS: CPT

## 2020-09-01 NOTE — PROGRESS NOTES
Daily Note     Today's date: 2020  Patient name: Flor Isaac  : 1950  MRN: [id number]  Referring provider: Tri Lopez MD  Dx:   Encounter Diagnosis     ICD-10-CM    1  Trochanteric bursitis of both hips  M70 61     M70 62    2  Abnormality of gait  R26 9    3  Vertigo  R42                   Subjective: Pt reported that B hip pain has improved since initiating PT and using beach towel for a lumbar roll while sleeping  Objective: See treatment diary below      Assessment: Pt educated in ways to manage LB, B hips and piriformis symptoms at home  Decreased B hip pain after man work  Plan: Cont with plan of care      Precautions: Patient's PMHx is remarkable for Asthma, HTN, Osteoporosis and Hysterectomy        Manuals         IASTM techniques to bilateral ITB and lateral hips in side lying    And piriformis man work on R  10 min 10 MIN  10 min  10 min  10 min                                                Ther Ex             Nu step or bike 10 MIN  10 MIN  10 min  10 min  10 min         Standing ITB stretch:B:  NT NT NT NT        Standing hip flexor stretch:B:  NT NT NT NT        LAQ:B:   NT NT NT        Hip flexion:B:     NT        Hamstring stretch forward and diagonal:B:  20SEC 5X  20SEC 5X  20SEC 5X  20sec 5x         piriformis stretch:B:  20SEC 5X 20SEC 5X 20SEC 5X 20sec 5x         LTR:B:  20X 3SEC  20X 3SEC  20X 3SEC  20x 3sec         DLS abdominal bracing:B:  30X 3SEC  30X 3SEC  30X 2SEC  30x 3sec         DLS abdominal bracing with slr flexion:B:  NT 20X 2#  20X  20x 2sec         DLS abdominal bracing with hip flexion:B:  NT 20X 2#  20X 3SEC  20x 3sec         Bridges  20X 2SEC  20X 3SEC  30X 3SEC  20x 3sec         Prone lying  3 MIN  3 MIN  3 MIN  3 min         Prone press ups:B:  20X   20X  30x         Prone hip IR and ER:B:  20x  20x 2# NT NT        Prone hip extension:B:  NT NT NT NT        SLRX3    20X 2#  20X  NT         Mini squats with DLS:B: NT NT 20X  20X         Lunges with DLS:B:  NT NT NT NT        Forward step ups:B:  NT NT NT NT        Lateral step ups:B:  NT NT NT NT        Step downs:B:  NT NT NT NT        Hip hiking:B:  NT NT NT NT                     SLS and Tandem stance:B:  NT NT NT NT        Tandem ambulation and side stepping in parallel bars or ballet bar with foam   NT NT NT NT                     [employer] balance system limits of stability  NT NT NT NT                                  Modalities             MHP to bilateral hips seated or supine  12 MIN  12 MIN  NT NT

## 2020-09-04 ENCOUNTER — OFFICE VISIT (OUTPATIENT)
Dept: PHYSICAL THERAPY | Age: 70
End: 2020-09-04
Payer: MEDICARE

## 2020-09-04 DIAGNOSIS — R42 VERTIGO: ICD-10-CM

## 2020-09-04 DIAGNOSIS — R26.9 ABNORMALITY OF GAIT: ICD-10-CM

## 2020-09-04 DIAGNOSIS — M70.61 TROCHANTERIC BURSITIS OF BOTH HIPS: Primary | ICD-10-CM

## 2020-09-04 DIAGNOSIS — M70.62 TROCHANTERIC BURSITIS OF BOTH HIPS: Primary | ICD-10-CM

## 2020-09-04 PROCEDURE — 97110 THERAPEUTIC EXERCISES: CPT

## 2020-09-04 PROCEDURE — 97140 MANUAL THERAPY 1/> REGIONS: CPT

## 2020-09-04 NOTE — PROGRESS NOTES
Daily Note     Today's date: 2020  Patient name: Bhumika Banuelos  : 1950  MRN: [id number]  Referring provider: Isabel Jenkins MD  Dx:   Encounter Diagnosis     ICD-10-CM    1  Trochanteric bursitis of both hips  M70 61     M70 62    2  Abnormality of gait  R26 9    3  Vertigo  R42                   Subjective: "My right buttock and leg hurt a lot over the last few days but its better now"       Objective: See treatment diary below      Assessment: Improved tolerance to B buttock and ITB man work, progress as able       Plan: Cont with plan of care      Precautions: Patient's PMHx is remarkable for Asthma, HTN, Osteoporosis and Hysterectomy        Manuals        IASTM techniques to bilateral ITB and lateral hips in side lying    And piriformis man work B  10 min 10 MIN  10 min  10 min  10 min  15 MIN                                               Ther Ex             Nu step or bike 10 MIN  10 MIN  10 min  10 min  10 min  10 MIN        Standing ITB stretch:B:  NT NT NT NT NT       Standing hip flexor stretch:B:  NT NT NT NT NT       LAQ:B:   NT NT NT NT       Hip flexion:B:     NT NT       Hamstring stretch forward and diagonal:B:  20SEC 5X  20SEC 5X  20SEC 5X  20sec 5x  20SEC 5X       piriformis stretch:B:  20SEC 5X 20SEC 5X 20SEC 5X 20sec 5x  20SEC 5X       LTR:B:  20X 3SEC  20X 3SEC  20X 3SEC  20x 3sec  20X 3SEC        DLS abdominal bracing:B:  30X 3SEC  30X 3SEC  30X 2SEC  30x 3sec  30X 3SEC        DLS abdominal bracing with slr flexion:B:  NT 20X 2#  20X  20x 2sec  20X       DLS abdominal bracing with hip flexion:B:  NT 20X 2#  20X 3SEC  20x 3sec  20X        Bridges  20X 2SEC  20X 3SEC  30X 3SEC  20x 3sec  [id number]       Prone lying  3 MIN  3 MIN  3 MIN  3 min  3 MIN        Prone press ups:B:  20X   20X  30x  30X       Prone hip IR and ER:B:  20x  20x 2# NT NT 20X       Prone hip extension:B:  NT NT NT NT NT       SLRX3    20X 2#  20X  NT  NT       Mini squats with DLS:B:  NT NT 30U  27U  NT       Lunges with DLS:B:  NT NT NT NT NT       Forward step ups:B:  NT NT NT NT NT       Lateral step ups:B:  NT NT NT NT NT       Step downs:B:  NT NT NT NT NT       Hip hiking:B:  NT NT NT NT NT                    SLS and Tandem stance:B:  NT NT NT NT NT       Tandem ambulation and side stepping in parallel bars or ballet bar with foam   NT NT NT NT NT                    [employer] balance system limits of stability  NT NT NT NT NT                                 Modalities             MHP to bilateral hips seated or supine  12 MIN  12 MIN  NT NT NT

## 2020-09-08 ENCOUNTER — OFFICE VISIT (OUTPATIENT)
Dept: PHYSICAL THERAPY | Age: 70
End: 2020-09-08
Payer: MEDICARE

## 2020-09-08 DIAGNOSIS — M70.62 TROCHANTERIC BURSITIS OF BOTH HIPS: Primary | ICD-10-CM

## 2020-09-08 DIAGNOSIS — R26.9 ABNORMALITY OF GAIT: ICD-10-CM

## 2020-09-08 DIAGNOSIS — M70.61 TROCHANTERIC BURSITIS OF BOTH HIPS: Primary | ICD-10-CM

## 2020-09-08 DIAGNOSIS — R42 VERTIGO: ICD-10-CM

## 2020-09-08 PROCEDURE — 97140 MANUAL THERAPY 1/> REGIONS: CPT

## 2020-09-08 PROCEDURE — 97110 THERAPEUTIC EXERCISES: CPT

## 2020-09-08 NOTE — PROGRESS NOTES
Daily Note     Today's date: 2020  Patient name: Nicole Hernandez  : 1950  MRN: [id number]  Referring provider: Liss Horton MD  Dx:   Encounter Diagnosis     ICD-10-CM    1  Trochanteric bursitis of both hips  M70 61     M70 62    2  Abnormality of gait  R26 9    3  Vertigo  R42                   Subjective: Pt reported improved B LE and buttock pain       Objective: See treatment diary below      Assessment: Pain with man work at R  L piriformis area, progress as able       Plan: Cont with plan of care      Precautions: Patient's PMHx is remarkable for Asthma, HTN, Osteoporosis and Hysterectomy        Manuals       IASTM techniques to bilateral ITB and lateral hips in side lying    And piriformis man work B  10 min 10 MIN  10 min  10 min  10 min  15 MIN  15 min                                              Ther Ex             Nu step or bike 10 MIN  10 MIN  10 min  10 min  10 min  10 MIN  10 min       Standing ITB stretch:B:  NT NT NT NT NT NT      Standing hip flexor stretch:B:  NT NT NT NT NT NT      LAQ:B:   NT NT NT NT 30X 2SEC 2#       Hip flexion:B:     NT NT       Hamstring stretch forward and diagonal:B:  20SEC 5X  20SEC 5X  20SEC 5X  20sec 5x  20SEC 5X NT      piriformis stretch:B:  20SEC 5X 20SEC 5X 20SEC 5X 20sec 5x  20SEC 5X NT      LTR:B:  20X 3SEC  20X 3SEC  20X 3SEC  20x 3sec  20X 3SEC  NT      DLS abdominal bracing:B:  30X 3SEC  30X 3SEC  30X 2SEC  30x 3sec  30X 3SEC  NT      DLS abdominal bracing with slr flexion:B:  NT 20X 2#  20X  20x 2sec  20X 20X      DLS abdominal bracing with hip flexion:B:  NT 20X 2#  20X 3SEC  20x 3sec  20X  20X      Bridges  20X 2SEC  20X 3SEC  30X 3SEC  20x 3sec  [id number] 20X 3SEC       Prone lying  3 MIN  3 MIN  3 MIN  3 min  3 MIN  3 MIN       Prone press ups:B:  20X   20X  30x  30X 30X      Prone hip IR and ER:B:  20x  20x 2# NT NT 20X NT      Prone hip extension:B:  NT NT NT NT NT NT      Curls        30x 2# Reema Tinsleyos 2#  20X  NT  NT 30X 2#       Mini squats with DLS:B:  NT NT 60A  14Y  NT 20X      Lunges with DLS:B:  NT NT NT NT NT NT      Forward step ups:B:  NT NT NT NT NT NT       Lateral step ups:B:  NT NT NT NT NT NT      Step downs:B:  NT NT NT NT NT NT      Hip hiking:B:  NT NT NT NT NT NT                   SLS and Tandem stance:B:  NT NT NT NT NT NT      Tandem ambulation and side stepping in parallel bars or ballet bar with foam   NT NT NT NT NT NT                   [employer] balance system limits of stability  NT NT NT NT NT NT                                Modalities             MHP to bilateral hips seated or supine  12 MIN  12 MIN  NT NT NT NT

## 2020-09-11 ENCOUNTER — OFFICE VISIT (OUTPATIENT)
Dept: PHYSICAL THERAPY | Age: 70
End: 2020-09-11
Payer: MEDICARE

## 2020-09-11 DIAGNOSIS — R26.9 ABNORMALITY OF GAIT: ICD-10-CM

## 2020-09-11 DIAGNOSIS — M70.61 TROCHANTERIC BURSITIS OF BOTH HIPS: Primary | ICD-10-CM

## 2020-09-11 DIAGNOSIS — M70.62 TROCHANTERIC BURSITIS OF BOTH HIPS: Primary | ICD-10-CM

## 2020-09-11 PROCEDURE — 97140 MANUAL THERAPY 1/> REGIONS: CPT

## 2020-09-11 PROCEDURE — 97110 THERAPEUTIC EXERCISES: CPT

## 2020-09-11 NOTE — PROGRESS NOTES
Daily Note     Today's date: 2020  Patient name: Noelle Castro  : 1950  MRN: [id number]  Referring provider: Jay James MD  Dx:   Encounter Diagnosis     ICD-10-CM    1  Trochanteric bursitis of both hips  M70 61     M70 62    2  Abnormality of gait  R26 9                   Subjective: Pt noted no pain today       Objective: See treatment diary below      Assessment: L>R piriformis pain today, overall improved pain and tenderness at B ITB       Plan: Cont with plan of care      Precautions: Patient's PMHx is remarkable for Asthma, HTN, Osteoporosis and Hysterectomy        Manuals      IASTM techniques to bilateral ITB and lateral hips in side lying    And piriformis man work B  10 min 10 MIN  10 min  10 min  10 min  15 MIN  15 min  15 min                                             Ther Ex             Nu step or bike 10 MIN  10 MIN  10 min  10 min  10 min  10 MIN  10 min  10 min      Standing ITB stretch:B:  NT NT NT NT NT NT NT     Standing hip flexor stretch:B:  NT NT NT NT NT NT NT     LAQ:B:   NT NT NT NT 30X 2SEC 2#  NT     Hip flexion:B:     NT NT  NT     Hamstring stretch forward and diagonal:B:  20SEC 5X  20SEC 5X  20SEC 5X  20sec 5x  20SEC 5X NT 20SEC 5X     piriformis stretch:B:  20SEC 5X 20SEC 5X 20SEC 5X 20sec 5x  20SEC 5X NT 20SEC 5X     LTR:B:  20X 3SEC  20X 3SEC  20X 3SEC  20x 3sec  20X 3SEC  NT 20X 3SEC      DLS abdominal bracing:B:  30X 3SEC  30X 3SEC  30X 2SEC  30x 3sec  30X 3SEC  NT 30X 3SEC      DLS abdominal bracing with slr flexion:B:  NT 20X 2#  20X  20x 2sec  20X 20X 20X      DLS abdominal bracing with hip flexion:B:  NT 20X 2#  20X 3SEC  20x 3sec  20X  20X 20X      Bridges  20X 2SEC  20X 3SEC  30X 3SEC  20x 3sec  [id number] 20X 3SEC  20X 3SEC      Prone lying  3 MIN  3 MIN  3 MIN  3 min  3 MIN  3 MIN  3 MIN      Prone press ups:B:  20X   20X  30x  30X 30X 30X     Prone hip IR and ER:B:  20x  20x 2# NT NT 20X NT 30X     Prone hip extension:B:  NT NT NT NT NT NT NT     Curls        30x 2#  30X 2#      [id number]    20X 2#  20X  NT  NT 30X 2#  30X      Mini squats with DLS:B:  NT NT 02J  67L  NT 20X 20X     Lunges with DLS:B:  NT NT NT NT NT NT NT     Forward step ups:B:  NT NT NT NT NT NT  NT     Lateral step ups:B:  NT NT NT NT NT NT NT     Step downs:B:  NT NT NT NT NT NT NT     Hip hiking:B:  NT NT NT NT NT NT NT             NT     SLS and Tandem stance:B:  NT NT NT NT NT NT NT     Tandem ambulation and side stepping in parallel bars or ballet bar with foam   NT NT NT NT NT NT NT                  [employer] balance system limits of stability  NT NT NT NT NT NT NT                               Modalities             MHP to bilateral hips seated or supine  12 MIN  12 MIN  NT NT NT NT NT

## 2020-09-15 ENCOUNTER — OFFICE VISIT (OUTPATIENT)
Dept: PHYSICAL THERAPY | Age: 70
End: 2020-09-15
Payer: MEDICARE

## 2020-09-15 DIAGNOSIS — R26.9 ABNORMALITY OF GAIT: ICD-10-CM

## 2020-09-15 DIAGNOSIS — R42 VERTIGO: ICD-10-CM

## 2020-09-15 DIAGNOSIS — M70.62 TROCHANTERIC BURSITIS OF BOTH HIPS: Primary | ICD-10-CM

## 2020-09-15 DIAGNOSIS — M70.61 TROCHANTERIC BURSITIS OF BOTH HIPS: Primary | ICD-10-CM

## 2020-09-15 PROCEDURE — 97110 THERAPEUTIC EXERCISES: CPT

## 2020-09-15 PROCEDURE — 97140 MANUAL THERAPY 1/> REGIONS: CPT

## 2020-09-15 NOTE — PROGRESS NOTES
Daily Note     Today's date: 9/15/2020  Patient name: Damian Acevedo  : 1950  MRN: [id number]  Referring provider: Jaylan Matias MD  Dx:   Encounter Diagnosis     ICD-10-CM    1  Trochanteric bursitis of both hips  M70 61     M70 62    2  Abnormality of gait  R26 9    3  Vertigo  R42                   Subjective:  "I am feeling much better"       Objective: See treatment diary below      Assessment: Good tolerance to exercises no pain at B LE and B buttock       Plan: Cont with plan of care      Precautions: Patient's PMHx is remarkable for Asthma, HTN, Osteoporosis and Hysterectomy        Manuals 8/19 8/21 8/25 8/28 9/1 9/4 9/8 9/11 9/15    IASTM techniques to bilateral ITB and lateral hips in side lying    And piriformis man work B  10 min 10 MIN  10 min  10 min  10 min  15 MIN  15 min  15 min  15 min                                            Ther Ex             Nu step or bike 10 MIN  10 MIN  10 min  10 min  10 min  10 MIN  10 min  10 min  10 min     Standing ITB stretch:B:  NT NT NT NT NT NT NT HEP     Standing hip flexor stretch:B:  NT NT NT NT NT NT NT HEP     LAQ:B:   NT NT NT NT 30X 2SEC 2#  NT NT    Hip flexion:B:     NT NT  NT NT    Hamstring stretch forward and diagonal:B:  20SEC 5X  20SEC 5X  20SEC 5X  20sec 5x  20SEC 5X NT 20SEC 5X 20sec 5x    piriformis stretch:B:  20SEC 5X 20SEC 5X 20SEC 5X 20sec 5x  20SEC 5X NT 20SEC 5X 20sec 5x    LTR:B:  20X 3SEC  20X 3SEC  20X 3SEC  20x 3sec  20X 3SEC  NT 20X 3SEC  20x 3sec     DLS abdominal bracing:B:  30X 3SEC  30X 3SEC  30X 2SEC  30x 3sec  30X 3SEC  NT 30X 3SEC  30x 3sec     DLS abdominal bracing with slr flexion:B:  NT 20X 2#  20X  20x 2sec  20X 20X 20X  20x    DLS abdominal bracing with hip flexion:B:  NT 20X 2#  20X 3SEC  20x 3sec  20X  20X 20X  20x    Bridges  20X 2SEC  20X 3SEC  30X 3SEC  20x 3sec  [id number] 20X 3SEC  20X 3SEC  [id number]     Prone lying  3 MIN  3 MIN  3 MIN  3 min  3 MIN  3 MIN  3 MIN  3 min     Prone press ups:B:  20X 20X  30x  30X 30X 30X 30x     Prone hip IR and ER:B:  20x  20x 2# NT NT 20X NT 30X NT    Prone hip extension:B:  NT NT NT NT NT NT NT NT    Curls        30x 2#  30X 2#  30X 3#     [place]    20X 2#  20X  NT  NT 30X 2#  30X  30X 3#     Mini squats with DLS:B:  NT NT 03J  19L  NT 20X 20X 30X     Lunges with DLS:B:  NT NT NT NT NT NT NT NT    Forward step ups:B:  NT NT NT NT NT NT  NT NT    Lateral step ups:B:  NT NT NT NT NT NT NT NT    Step downs:B:  NT NT NT NT NT NT NT NT    Hip hiking:B:  NT NT NT NT NT NT NT NT            NT NT    SLS and Tandem stance:B:  NT NT NT NT NT NT NT NT    Tandem ambulation and side stepping in parallel bars or ballet bar with foam   NT NT NT NT NT NT NT NT                 [employer] balance system limits of stability  NT NT NT NT NT NT NT NT                              Modalities             MHP to bilateral hips seated or supine  12 MIN  12 MIN  NT NT NT NT NT NT

## 2020-09-18 ENCOUNTER — OFFICE VISIT (OUTPATIENT)
Dept: PHYSICAL THERAPY | Age: 70
End: 2020-09-18
Payer: MEDICARE

## 2020-09-18 DIAGNOSIS — M70.62 TROCHANTERIC BURSITIS OF BOTH HIPS: Primary | ICD-10-CM

## 2020-09-18 DIAGNOSIS — R26.9 ABNORMALITY OF GAIT: ICD-10-CM

## 2020-09-18 DIAGNOSIS — R42 VERTIGO: ICD-10-CM

## 2020-09-18 DIAGNOSIS — M70.61 TROCHANTERIC BURSITIS OF BOTH HIPS: Primary | ICD-10-CM

## 2020-09-18 PROCEDURE — 97110 THERAPEUTIC EXERCISES: CPT

## 2020-09-18 PROCEDURE — 97140 MANUAL THERAPY 1/> REGIONS: CPT

## 2020-09-18 NOTE — PROGRESS NOTES
Daily Note     Today's date: 2020  Patient name: Princess Oh  : 1950  MRN: [id number]  Referring provider: Sandy James MD  Dx:   Encounter Diagnosis     ICD-10-CM    1  Trochanteric bursitis of both hips  M70 61     M70 62    2  Abnormality of gait  R26 9    3  Vertigo  R42                   Subjective: "my hip hurts on the inside otherwise I feel great"       Objective: See treatment diary below      Assessment: Pt tolerated session well  Tentative D/C next week     Plan: Cont with plan of care      Precautions: Patient's PMHx is remarkable for Asthma, HTN, Osteoporosis and Hysterectomy        Manuals 8/19 8/21 8/25 8/28 9/1 9/4 9/8 9/11 9/15 9/18   IASTM techniques to bilateral ITB and lateral hips in side lying    And piriformis man work B  10 min 10 MIN  10 min  10 min  10 min  15 MIN  15 min  15 min  15 min  15 min                                           Ther Ex             Nu step or bike 10 MIN  10 MIN  10 min  10 min  10 min  10 MIN  10 min  10 min  10 min  10 min    Standing ITB stretch:B:  NT NT NT NT NT NT NT HEP  20sec 5x B    Standing hip flexor stretch:B:  NT NT NT NT NT NT NT HEP  20sec 5x    LAQ:B:   NT NT NT NT 30X 2SEC 2#  NT NT NT   Hip flexion:B:     NT NT  NT NT NT   Hamstring stretch forward and diagonal:B:  20SEC 5X  20SEC 5X  20SEC 5X  20sec 5x  20SEC 5X NT 20SEC 5X 20sec 5x 20SEC 5X   piriformis stretch:B:  20SEC 5X 20SEC 5X 20SEC 5X 20sec 5x  20SEC 5X NT 20SEC 5X 20sec 5x 20SEC 5X   LTR:B:  20X 3SEC  20X 3SEC  20X 3SEC  20x 3sec  20X 3SEC  NT 20X 3SEC  20x 3sec  20X 3SEC    DLS abdominal bracing:B:  30X 3SEC  30X 3SEC  30X 2SEC  30x 3sec  30X 3SEC  NT 30X 3SEC  30x 3sec  20X 2SEC    DLS abdominal bracing with slr flexion:B:  NT 20X 2#  20X  20x 2sec  20X 20X 20X  20x 20X 3SEC    DLS abdominal bracing with hip flexion:B:  NT 20X 2#  20X 3SEC  20x 3sec  20X  20X 20X  20x 20X    Bridges  20X 2SEC  20X 3SEC  30X 3SEC  20x 3sec  [id number] 20X 3SEC  20X 3SEC  [id number] 20X 3SEC    Prone lying  3 MIN  3 MIN  3 MIN  3 min  3 MIN  3 MIN  3 MIN  3 min  3 MIN    Prone press ups:B:  20X   20X  30x  30X 30X 30X 30x  30X    Prone hip IR and ER:B:  20x  20x 2# NT NT 20X NT 30X NT 20X    Prone hip extension:B:  NT NT NT NT NT NT NT NT NT   Curls        30x 2#  30X 2#  30X 3#  20X   [id number]    20X 2#  20X  NT  NT 30X 2#  30X  30X 3#  20X   Mini squats with DLS:B:  NT NT 37S  44L  NT 20X 20X 30X  30X   Lunges with DLS:B:  NT NT NT NT NT NT NT NT 30X   Forward step ups:B:  NT NT NT NT NT NT  NT NT NT   Lateral step ups:B:  NT NT NT NT NT NT NT NT NT   Step downs:B:  NT NT NT NT NT NT NT NT NT   Hip hiking:B:  NT NT NT NT NT NT NT NT NT           NT NT NT   SLS and Tandem stance:B:  NT NT NT NT NT NT NT NT NT   Tandem ambulation and side stepping in parallel bars or ballet bar with foam   NT NT NT NT NT NT NT NT NT                [employer] balance system limits of stability  NT NT NT NT NT NT NT NT NT                             Modalities             MHP to bilateral hips seated or supine  12 MIN  12 MIN  NT NT NT NT NT NT CP sideline to R hip

## 2020-09-22 ENCOUNTER — OFFICE VISIT (OUTPATIENT)
Dept: PHYSICAL THERAPY | Age: 70
End: 2020-09-22
Payer: MEDICARE

## 2020-09-22 DIAGNOSIS — M70.61 TROCHANTERIC BURSITIS OF BOTH HIPS: Primary | ICD-10-CM

## 2020-09-22 DIAGNOSIS — R26.9 ABNORMALITY OF GAIT: ICD-10-CM

## 2020-09-22 DIAGNOSIS — M70.62 TROCHANTERIC BURSITIS OF BOTH HIPS: Primary | ICD-10-CM

## 2020-09-22 PROCEDURE — 97140 MANUAL THERAPY 1/> REGIONS: CPT | Performed by: PHYSICAL THERAPIST

## 2020-09-22 PROCEDURE — 97110 THERAPEUTIC EXERCISES: CPT | Performed by: PHYSICAL THERAPIST

## 2020-09-22 NOTE — PROGRESS NOTES
Daily Note     Today's date: 2020  Patient name: Rose Delgado  : 1950  MRN: [id number]  Referring provider: Liliam Arboleda MD  Dx:   Encounter Diagnosis     ICD-10-CM    1  Trochanteric bursitis of both hips  M70 61     M70 62    2  Abnormality of gait  R26 9                   Subjective: Patient reported minimal bilateral lateral hip and buttock soft tissue region pain persists that limits prolonged weight baring activities  Objective: See treatment diary below  Assessment: Patient exhibits short term bilateral ITB and piriformis region pain eliminated with manual therapy techniques, therapeutic exercises and self stretching thus pt educated on use of importance of hep of stretching and arom activities to provide long term pain reduction and functional progress  Plan: Cont with plan of care  Precautions: Patient's PMHx is remarkable for Asthma, HTN, Osteoporosis and Hysterectomy        Manuals 9/22 8/21 8/25 8/28 9/1 9/4 9/8 9/11 9/15 9/18   IASTM techniques to bilateral ITB and lateral hips in side lying    And piriformis man work B  15 min 10 MIN  10 min  10 min  10 min  15 MIN  15 min  15 min  15 min  15 min                                           Ther Ex             Nu step or bike 10 MIN  10 MIN  10 min  10 min  10 min  10 MIN  10 min  10 min  10 min  10 min    Standing ITB stretch:B: 20 sec x 5 NT NT NT NT NT NT NT HEP  20sec 5x B    Standing hip flexor stretch:B: 20 sec x 5 NT NT NT NT NT NT NT HEP  20sec 5x    LAQ:B: NT  NT NT NT NT 30X 2SEC 2#  NT NT NT   Hip flexion:B: NT    NT NT  NT NT NT   Hamstring stretch forward and diagonal:B: 20 sec x 5 20SEC 5X  20SEC 5X  20SEC 5X  20sec 5x  20SEC 5X NT 20SEC 5X 20sec 5x 20SEC 5X   piriformis stretch:B: 20 sec x 5 20SEC 5X 20SEC 5X 20SEC 5X 20sec 5x  20SEC 5X NT 20SEC 5X 20sec 5x 20SEC 5X   LTR:B: 10 sec x 5 20X 3SEC  20X 3SEC  20X 3SEC  20x 3sec  20X 3SEC  NT 20X 3SEC  20x 3sec  20X 3SEC    DLS abdominal bracing:B: NT 30X 3SEC  30X 3SEC  30X 2SEC  30x 3sec  30X 3SEC  NT 30X 3SEC  30x 3sec  20X 2SEC    DLS abdominal bracing with slr flexion:B: 2 x 10 NT 20X 2#  20X  20x 2sec  20X 20X 20X  20x 20X 3SEC    DLS abdominal bracing with hip flexion:B: 2 x 10 NT 20X 2#  20X 3SEC  20x 3sec  20X  20X 20X  20x 20X    Bridges 2 x 10 20X 2SEC  20X 3SEC  30X 3SEC  20x 3sec  [id number] 20X 3SEC  20X 3SEC  [id number]  20X 3SEC    Prone lying NT 3 MIN  3 MIN  3 MIN  3 min  3 MIN  3 MIN  3 MIN  3 min  3 MIN    Prone press ups:B: NT 20X   20X  30x  30X 30X 30X 30x  30X    Prone hip IR and ER:B: NT 20x  20x 2# NT NT 20X NT 30X NT 20X    Prone hip extension:B: NT NT NT NT NT NT NT NT NT NT   Curls  NT      30x 2#  30X 2#  30X 3#  20X   SLRX3  2 x 10  20X 2#  20X  NT  NT 30X 2#  30X  30X 3#  20X   Mini squats with DLS:B: 3 x 10 NT NT 72A  20X  NT 20X 20X 30X  30X   Lunges with DLS:B: NT NT NT NT NT NT NT NT NT 30X   Forward step ups:B: NT NT NT NT NT NT NT  NT NT NT   Lateral step ups:B: NT NT NT NT NT NT NT NT NT NT   Step downs:B: NT NT NT NT NT NT NT NT NT NT   Hip hiking:B: NT NT NT NT NT NT NT NT NT NT           NT NT NT   SLS and Tandem stance:B: NT NT NT NT NT NT NT NT NT NT   Tandem ambulation and side stepping in parallel bars or ballet bar with foam  NT NT NT NT NT NT NT NT NT NT                Biodex balance system limits of stability  NT NT NT NT NT NT NT NT NT                             Modalities             MHP to bilateral hips seated or supine NT 12 MIN  12 MIN  NT NT NT NT NT NT CP sideline to R hip

## 2020-09-25 ENCOUNTER — APPOINTMENT (OUTPATIENT)
Dept: PHYSICAL THERAPY | Age: 70
End: 2020-09-25
Payer: MEDICARE

## 2020-09-29 ENCOUNTER — EVALUATION (OUTPATIENT)
Dept: PHYSICAL THERAPY | Age: 70
End: 2020-09-29
Payer: MEDICARE

## 2020-09-29 DIAGNOSIS — M70.61 TROCHANTERIC BURSITIS OF BOTH HIPS: Primary | ICD-10-CM

## 2020-09-29 DIAGNOSIS — M70.62 TROCHANTERIC BURSITIS OF BOTH HIPS: Primary | ICD-10-CM

## 2020-09-29 DIAGNOSIS — R26.9 ABNORMALITY OF GAIT: ICD-10-CM

## 2020-09-29 PROCEDURE — 97140 MANUAL THERAPY 1/> REGIONS: CPT | Performed by: PHYSICAL THERAPIST

## 2020-09-29 PROCEDURE — 97750 PHYSICAL PERFORMANCE TEST: CPT | Performed by: PHYSICAL THERAPIST

## 2020-09-29 PROCEDURE — 97110 THERAPEUTIC EXERCISES: CPT | Performed by: PHYSICAL THERAPIST

## 2020-09-29 NOTE — LETTER
October 15, 2020    Rickey Perrin MD  [address] [place] [address]    Patient: Oanh Little   YOB: 1950   Date of Visit: 2020     Encounter Diagnosis     ICD-10-CM    1  Trochanteric bursitis of both hips  M70 61     M70 62    2  Abnormality of gait  R26 9        Dear Dr Bruce Galeanaure: Thank you for your recent referral of Oanh Little  Please review the attached evaluation summary from [facility] recent visit  Please verify that you agree with the plan of care by signing the attached order  If you have any questions or concerns, please do not hesitate to call  I sincerely appreciate the opportunity to share in the care of one of your patients and hope to have another opportunity to work with you in the near future  Sincerely,    Mckayla Gracia, PT      Referring Provider:      I certify that I have read the below Plan of Care and certify the need for these services furnished under this plan of treatment while under my care  Rickey Perrin MD  [address] [place] [address]  VIA Facsimile: [phone]          PT Evaluation  / PT Reassessment    Today's date: 2020  Patient name: Oanh Little  : 1950  MRN: [id number]  Referring provider: Claudia Lim MD  Dx:   Encounter Diagnosis     ICD-10-CM    1  Trochanteric bursitis of both hips  M70 61     M70 62    2  Abnormality of gait  R26 9                   Assessment  Assessment details: PT Reassessment: 2020  Patient reported the following progress since onset of PT:decrease in bilateral hip pain, decrease in right piriformis region muscle guarding and pain, increase in bilateral le rom and strength, gait improved and functional progress  But she noted the following deficits that still persist: prolonged sitting creating pain in bilateral hip and low back, sit to stand transfers after prolonged sitting      PT IE: 08/19/2020  Patient noted long standing bilateral hip pain persists which limits sleep due to side lying position on either side waking her up  Patient noted she is also unable to resume recreational activities  Patient was scheduled for PT and COVID-19 pandemic occurred  Patient noted her balance remains limited still  Patient noted bilateral hips remain weak  Patient noted walking and stair climbing is limited by bilateral hip pain  Patient noted squatting is also limited by bilateral hip pain aggravation with functional activities like cleaning up the floor  Patient noted use of manual therapy techniques, supine lying and stretching and pool based swimming is helpful in pain reduction of bilateral hip pain  Patient noted prolonged static standing and sitting aggravates her pain  Patient noted bilateral hip pain at least is a 5 of 10 and at worst at 9 of 10  Patient noted lying on either side feels as if she is lying on "rocks"  Patient noted she will tightness into distal right lateral ITB at knee  Patient denies any recent falls  Patient noted her balance is improved since treated for vertigo by PT  Patient denies bilateral hip diagnostic testing  Patient noted she wakes in the morning with right SI joint region pain that radiates into the left low back as well as right groin pain  Impairments: abnormal gait, abnormal or restricted ROM, abnormal movement, activity intolerance, lacks appropriate home exercise program, pain with function and poor body mechanics  Understanding of Dx/Px/POC: good   Prognosis: good  Prognosis details: Patient is a 69y o  year old female seen for outpatient PT evaluation with pain, mobility and functional deficits due to bilateral hip trochanteric bursitis  Patient presents with the following progress since onset of PT: decrease in bilateral hip pain, increase in bilateral le rom and strength, gait and stair progress, balance progress and functional progress  But, patient presents to PT on reassessment with the following problems, concerns, deficits and that still persist: bilateral lateral hip and distal ITB region pain, decreased bilateral le range of motion, decreased bilateral le strength, + TTP, gait and stair dysfunctions, decrease in balance, functional limitations and decreased tolerance to activity  Patient would benefit from skilled PT services under the following PT treatment plan to address the above noted deficits: therapeutic exercises and activities to facilitate bilateral le rom and strength, gait and stair training, balance and proprioception activities, modalities, manual therapy techniques, kinesio taping techniques, IASTM techniques and a hep  Thank you for the referral      Goals  Short Term goals - 4 weeks  1  Patient will be independent HEP   MET  2   Patient will report a 25 - 50% decrease in pain complaints  MET  3   Increase strength 1/2 grade  MET  4   Increase ROM 5-10 degrees  MET  Long Term goals - 8 weeks  1  Patient will report elimination of pain complaints  Partially MET  2   Patient will return to all recreational activities without restriction  Partially MET  3   ROM WFL MET   4   Strength 4 to 5/5  MET  5   Patient will report Sleep improved by > 60 minutes due to increase in side lying tolerance  Partially MET  6   Patient will report being able to lie on either Side lying position without pain aggravation  Partially MET  7   Patient will report Walking improved by > 50 % so she can resume walking with for fitness activities  Partially MET  8   Patient will report Stair climbing improve to reciprocal pattern with both ascent and descent  Partially MET  9   Patient will exhibit Balance improvement by > 25 %  Partially MET  10   Patient will exhibit Squatting improved during functional activities of picking up items off the floor  Partially MET    11   Patient will report Prolonged sitting and standing based functional activities improved so she lacks functional deficits  Partially MET  Plan  Patient would benefit from: skilled physical therapy  Planned modality interventions: cryotherapy, TENS, thermotherapy: hydrocollator packs and unattended electrical stimulation  Planned therapy interventions: manual therapy, massage, joint mobilization, aquatic therapy, balance, balance/weight bearing training, neuromuscular re-education, patient education, postural training, body mechanics training, self care, compression, strengthening, stretching, therapeutic activities, therapeutic exercise, therapeutic training, flexibility, functional ROM exercises, gait training, graded activity, graded exercise, graded motor and home exercise program  Frequency: 2x week  Duration in weeks: 8  Treatment plan discussed with: patient        Subjective Evaluation    History of Present Illness  Mechanism of injury: Patient's PMHx is remarkable for Vertigo, Asthma, HTN, Osteoporosis and Hysterectomy  Pain  At best pain rating: 3  At worst pain ratin  Location: Bilateral hip and ITB    Patient Goals  Patient goals for therapy: decreased pain, improved balance, increased motion, increased strength and return to sport/leisure activities  Patient goal: Patient's goal is to walk without pain"  Objective     Tenderness     Additional Tenderness Details  Patient is + moderate TTP at bilateral greater trochanter region and distal ITB      Active Range of Motion   Left Hip   Flexion: 116 degrees with pain  Abduction: 30 degrees     Right Hip   Flexion: 112 degrees with pain  Abduction: 30 degrees   Left Knee   Flexion: 132 degrees   Extension: -5 degrees   Extensor la degrees     Right Knee   Flexion: 135 degrees   Extension: -5 degrees   Extensor la degrees     Strength/Myotome Testing     Left Hip   Planes of Motion   Flexion: 4+  Extension: 4+  Abduction: 4+  Adduction: 5  External rotation: 4  Internal rotation: 4+    Right Hip   Planes of Motion   Flexion: 4+  Extension: 4+  Abduction: 4+  Adduction: 5  External rotation: 4  Internal rotation: 4    Left Knee   Flexion: 4+  Extension: 5    Right Knee   Flexion: 4+  Extension: 5    Left Ankle/Foot   Dorsiflexion: 5    Right Ankle/Foot   Dorsiflexion: 5    Ambulation     Ambulation: Level Surfaces   Ambulation without assistive device: independent    Additional Level Surfaces Ambulation Details  Patient ambulates with compensated bilateral trendelenburg gait deviation  Ambulation: Stairs   Ascend stairs: independent  Pattern: reciprocal  Railings: two rails  Descend stairs: independent  Pattern: non-reciprocal  Railings: two rails    Comments   PT IE: 08/19/2020  TUG is at 10 21 seconds  Limits of Stability testing  No UE support  Skill level: Easy  Time to Complete level:  37  Seconds  Overall:            Actual:     51       Goal:    65  Forward:           Actual:     66      Goal:    65   Backward:        Actual:     82      Goal:    30  Left:                   Actual:     55       Goal:    65  Right:                    Actual:     72      Goal:    65  Forward / Left:        Actual:     53      Goal:    65  Forward / Right:            Actual:     47       Goal:    65   Backward / Left:           Actual:     63      Goal:    65   Backward / Right:        Actual:     59      Goal:    65       Limits of Stability testing  No UE support  Skill level: Moderate  Time to Complete level:  55  Seconds  Overall:            Actual:     50       Goal:    65  Forward:           Actual:     76      Goal:    65   Backward:        Actual:     31      Goal:    30  Left:                   Actual:     62       Goal:    65  Right:                    Actual:     80     Goal:    65  Forward / Left:        Actual:     46      Goal:    65    Forward / Right:            Actual:     52       Goal:    65   Backward / Left:           Actual:     37      Goal:    65   Backward / Right: Actual:     49      Goal:    65         PT IE: 09/29/2020  TUG is at 8 44 seconds  Limits of Stability testing  No UE support  Skill level: Easy  Time to Complete level:  38  Seconds  Overall:            Actual:     34       Goal:    65  Forward:           Actual:     51      Goal:    65   Backward:        Actual:     45      Goal:    30  Left:                   Actual:     63       Goal:    65  Right:                    Actual:     60      Goal:    65  Forward / Left:        Actual:     39      Goal:    65  Forward / Right:            Actual:     35       Goal:    65   Backward / Left:           Actual:     23      Goal:    65   Backward / Right:        Actual:     33      Goal:    65       Limits of Stability testing  No UE support  Skill level: Moderate  Time to Complete level:  41  Seconds  Overall:            Actual:     47       Goal:    65  Forward:           Actual:     50      Goal:    65   Backward:        Actual:     45      Goal:    30  Left:                   Actual:     51       Goal:    65  Right:                    Actual:     68     Goal:    65  Forward / Left:        Actual:     58      Goal:    65  Forward / Right:            Actual:     40       Goal:    65   Backward / Left:           Actual:     49      Goal:    65   Backward / Right:        Actual:     44      Goal:    65                          Precautions: Patient's PMHx is remarkable for Asthma, HTN, Osteoporosis and Hysterectomy        Manuals 9/22 9/29 8/25 8/28 9/1 9/4 9/8 9/11 9/15 9/18   IASTM techniques to bilateral ITB and lateral hips in side lying    And piriformis man work B  15 min 10 MIN  10 min  10 min  10 min  15 MIN  15 min  15 min  15 min  15 min                                           Ther Ex             Nu step or bike 10 MIN  10 MIN  10 min  10 min  10 min  10 MIN  10 min  10 min  10 min  10 min    Standing ITB stretch:B: 20 sec x 5 20 sec x 5 NT NT NT NT NT NT HEP  20sec 5x B    Standing hip flexor stretch:B: 20 sec x 5 NT NT NT NT NT NT NT HEP  20sec 5x    LAQ:B: NT NT NT NT NT NT 30X 2SEC 2#  NT NT NT   Hip flexion:B: NT NT   NT NT  NT NT NT   Hamstring stretch forward and diagonal:B: 20 sec x 5 20SEC 5X  20SEC 5X  20SEC 5X  20sec 5x  20SEC 5X NT 20SEC 5X 20sec 5x 20SEC 5X   piriformis stretch:B: 20 sec x 5 20SEC 5X 20SEC 5X 20SEC 5X 20sec 5x  20SEC 5X NT 20SEC 5X 20sec 5x 20SEC 5X   LTR:B: 10 sec x 5 20X 3SEC  20X 3SEC  20X 3SEC  20x 3sec  20X 3SEC  NT 20X 3SEC  20x 3sec  20X 3SEC    DLS abdominal bracing:B: NT NT  30X 3SEC  30X 2SEC  30x 3sec  30X 3SEC  NT 30X 3SEC  30x 3sec  20X 2SEC    DLS abdominal bracing with slr flexion:B: 2 x 10 2 x 10 20X 2#  20X  20x 2sec  20X 20X 20X  20x 20X 3SEC    DLS abdominal bracing with hip flexion:B: 2 x 10 NT 20X 2#  20X 3SEC  20x 3sec  20X  20X 20X  20x 20X    Bridges 2 x 10 20X 3SEC  20X 3SEC  30X 3SEC  20x 3sec  [id number] 20X 3SEC  20X 3SEC  [id number]  20X 3SEC    Prone lying NT NT 3 MIN  3 MIN  3 min  3 MIN  3 MIN  3 MIN  3 min  3 MIN    Prone press ups:B: NT NT   20X  30x  30X 30X 30X 30x  30X    Prone hip IR and ER:B: NT NT  20x 2# NT NT 20X NT 30X NT 20X    Prone hip extension:B: NT NT NT NT NT NT NT NT NT NT   Curls  NT 20x     30x 2#  30X 2#  30X 3#  20X   SLRX3  2 x 10 20 x  20X 2#  20X  NT  NT 30X 2#  30X  30X 3#  20X   Mini squats with DLS:B: 3 x 10 20 x NT 20X  20X  NT 20X 20X 30X  30X   Lunges with DLS:B: NT NT NT NT NT NT NT NT NT 30X   Forward step ups:B: NT NT NT NT NT NT NT  NT NT NT   Lateral step ups:B: NT NT NT NT NT NT NT NT NT NT   Step downs:B: NT NT NT NT NT NT NT NT NT NT   Hip hiking:B: NT NT NT NT NT NT NT NT NT NT           NT NT NT   SLS and Tandem stance:B: NT NT NT NT NT NT NT NT NT NT   Tandem ambulation and side stepping in parallel bars or ballet bar with foam  NT NT NT NT NT NT NT NT NT NT                Fastclick balance system limits of stability  NT NT NT NT NT NT NT NT NT                             Modalities             MHP to bilateral hips seated or supine NT NT  12 MIN  NT NT NT NT NT NT CP sideline to R hip

## 2020-09-29 NOTE — PROGRESS NOTES
PT Evaluation  / PT Reassessment    Today's date: 2020  Patient name: Ellie Ruiz  : 1950  MRN: [id number]  Referring provider: Danielle Clay MD  Dx:   Encounter Diagnosis     ICD-10-CM    1  Trochanteric bursitis of both hips  M70 61     M70 62    2  Abnormality of gait  R26 9                   Assessment  Assessment details: PT Reassessment: 2020  Patient reported the following progress since onset of PT:decrease in bilateral hip pain, decrease in right piriformis region muscle guarding and pain, increase in bilateral le rom and strength, gait improved and functional progress  But she noted the following deficits that still persist: prolonged sitting creating pain in bilateral hip and low back, sit to stand transfers after prolonged sitting  PT IE: 2020  Patient noted long standing bilateral hip pain persists which limits sleep due to side lying position on either side waking her up  Patient noted she is also unable to resume recreational activities  Patient was scheduled for PT and COVID-19 pandemic occurred  Patient noted her balance remains limited still  Patient noted bilateral hips remain weak  Patient noted walking and stair climbing is limited by bilateral hip pain  Patient noted squatting is also limited by bilateral hip pain aggravation with functional activities like cleaning up the floor  Patient noted use of manual therapy techniques, supine lying and stretching and pool based swimming is helpful in pain reduction of bilateral hip pain  Patient noted prolonged static standing and sitting aggravates her pain  Patient noted bilateral hip pain at least is a 5 of 10 and at worst at 9 of 10  Patient noted lying on either side feels as if she is lying on "rocks"  Patient noted she will tightness into distal right lateral ITB at knee  Patient denies any recent falls  Patient noted her balance is improved since treated for vertigo by PT    Patient denies bilateral hip diagnostic testing  Patient noted she wakes in the morning with right SI joint region pain that radiates into the left low back as well as right groin pain  Impairments: abnormal gait, abnormal or restricted ROM, abnormal movement, activity intolerance, lacks appropriate home exercise program, pain with function and poor body mechanics  Understanding of Dx/Px/POC: good   Prognosis: good  Prognosis details: Patient is a 69y o  year old female seen for outpatient PT evaluation with pain, mobility and functional deficits due to bilateral hip trochanteric bursitis  Patient presents with the following progress since onset of PT: decrease in bilateral hip pain, increase in bilateral le rom and strength, gait and stair progress, balance progress and functional progress  But, patient presents to PT on reassessment with the following problems, concerns, deficits and that still persist: bilateral lateral hip and distal ITB region pain, decreased bilateral le range of motion, decreased bilateral le strength, + TTP, gait and stair dysfunctions, decrease in balance, functional limitations and decreased tolerance to activity  Patient would benefit from skilled PT services under the following PT treatment plan to address the above noted deficits: therapeutic exercises and activities to facilitate bilateral le rom and strength, gait and stair training, balance and proprioception activities, modalities, manual therapy techniques, kinesio taping techniques, IASTM techniques and a hep  Thank you for the referral      Goals  Short Term goals - 4 weeks  1  Patient will be independent HEP   MET  2   Patient will report a 25 - 50% decrease in pain complaints  MET  3   Increase strength 1/2 grade  MET  4   Increase ROM 5-10 degrees  MET  Long Term goals - 8 weeks  1  Patient will report elimination of pain complaints  Partially MET    2   Patient will return to all recreational activities without restriction  Partially MET  3   ROM WFL MET   4   Strength 4 to 5/5  MET  5   Patient will report Sleep improved by > 60 minutes due to increase in side lying tolerance  Partially MET  6   Patient will report being able to lie on either Side lying position without pain aggravation  Partially MET  7   Patient will report Walking improved by > 50 % so she can resume walking with for fitness activities  Partially MET  8   Patient will report Stair climbing improve to reciprocal pattern with both ascent and descent  Partially MET  9   Patient will exhibit Balance improvement by > 25 %  Partially MET  10   Patient will exhibit Squatting improved during functional activities of picking up items off the floor  Partially MET  11   Patient will report Prolonged sitting and standing based functional activities improved so she lacks functional deficits  Partially MET  Plan  Patient would benefit from: skilled physical therapy  Planned modality interventions: cryotherapy, TENS, thermotherapy: hydrocollator packs and unattended electrical stimulation  Planned therapy interventions: manual therapy, massage, joint mobilization, aquatic therapy, balance, balance/weight bearing training, neuromuscular re-education, patient education, postural training, body mechanics training, self care, compression, strengthening, stretching, therapeutic activities, therapeutic exercise, therapeutic training, flexibility, functional ROM exercises, gait training, graded activity, graded exercise, graded motor and home exercise program  Frequency: 2x week  Duration in weeks: 8  Treatment plan discussed with: patient        Subjective Evaluation    History of Present Illness  Mechanism of injury: Patient's PMHx is remarkable for Vertigo, Asthma, HTN, Osteoporosis and Hysterectomy    Pain  At best pain rating: 3  At worst pain ratin  Location: Bilateral hip and ITB    Patient Goals  Patient goals for therapy: decreased pain, improved balance, increased motion, increased strength and return to sport/leisure activities  Patient goal: Patient's goal is to walk without pain"  Objective     Tenderness     Additional Tenderness Details  Patient is + moderate TTP at bilateral greater trochanter region and distal ITB  Active Range of Motion   Left Hip   Flexion: 116 degrees with pain  Abduction: 30 degrees     Right Hip   Flexion: 112 degrees with pain  Abduction: 30 degrees   Left Knee   Flexion: 132 degrees   Extension: -5 degrees   Extensor la degrees     Right Knee   Flexion: 135 degrees   Extension: -5 degrees   Extensor la degrees     Strength/Myotome Testing     Left Hip   Planes of Motion   Flexion: 4+  Extension: 4+  Abduction: 4+  Adduction: 5  External rotation: 4  Internal rotation: 4+    Right Hip   Planes of Motion   Flexion: 4+  Extension: 4+  Abduction: 4+  Adduction: 5  External rotation: 4  Internal rotation: 4    Left Knee   Flexion: 4+  Extension: 5    Right Knee   Flexion: 4+  Extension: 5    Left Ankle/Foot   Dorsiflexion: 5    Right Ankle/Foot   Dorsiflexion: 5    Ambulation     Ambulation: Level Surfaces   Ambulation without assistive device: independent    Additional Level Surfaces Ambulation Details  Patient ambulates with compensated bilateral trendelenburg gait deviation  Ambulation: Stairs   Ascend stairs: independent  Pattern: reciprocal  Railings: two rails  Descend stairs: independent  Pattern: non-reciprocal  Railings: two rails    Comments   PT IE: 2020  TUG is at 10 21 seconds  Limits of Stability testing  No UE support  Skill level: Easy  Time to Complete level:  37  Seconds  Overall:            Actual:     51       Goal:    65  Forward:           Actual:     66      Goal:    65   Backward:        Actual:     82      Goal:    30  Left:                   Actual:     55       Goal:    65  Right:                    Actual:     72      Goal:    65    Forward / Left:        Actual:     53 Goal:    65  Forward / Right:            Actual:     47       Goal:    65   Backward / Left:           Actual:     63      Goal:    65   Backward / Right:        Actual:     59      Goal:    65       Limits of Stability testing  No UE support  Skill level: Moderate  Time to Complete level:  55  Seconds  Overall:            Actual:     50       Goal:    65  Forward:           Actual:     76      Goal:    65   Backward:        Actual:     31      Goal:    30  Left:                   Actual:     62       Goal:    65  Right:                    Actual:     80     Goal:    65  Forward / Left:        Actual:     46      Goal:    65  Forward / Right:            Actual:     52       Goal:    65   Backward / Left:           Actual:     37      Goal:    65   Backward / Right:        Actual:     49      Goal:    65         PT IE: 09/29/2020  TUG is at 8 44 seconds  Limits of Stability testing  No UE support  Skill level: Easy  Time to Complete level:  38  Seconds  Overall:            Actual:     34       Goal:    65  Forward:           Actual:     51      Goal:    65   Backward:        Actual:     45      Goal:    30  Left:                   Actual:     63       Goal:    65  Right:                    Actual:     60      Goal:    65  Forward / Left:        Actual:     39      Goal:    65  Forward / Right:            Actual:     35       Goal:    65   Backward / Left:           Actual:     23      Goal:    65   Backward / Right:        Actual:     33      Goal:    65       Limits of Stability testing  No UE support  Skill level: Moderate  Time to Complete level:  41  Seconds  Overall:            Actual:     47       Goal:    65  Forward:           Actual:     50      Goal:    65   Backward:        Actual:     45      Goal:    30  Left:                   Actual:     51       Goal:    65  Right:                    Actual:     68     Goal:    65    Forward / Left:        Actual:     58 Goal:    65  Forward / Right:            Actual:     40       Goal:    65   Backward / Left:           Actual:     49      Goal:    65   Backward / Right:        Actual:     44      Goal:    65                          Precautions: Patient's PMHx is remarkable for Asthma, HTN, Osteoporosis and Hysterectomy        Manuals 9/22 9/29 8/25 8/28 9/1 9/4 9/8 9/11 9/15 9/18   IASTM techniques to bilateral ITB and lateral hips in side lying    And piriformis man work B  15 min 10 MIN  10 min  10 min  10 min  15 MIN  15 min  15 min  15 min  15 min                                           Ther Ex             Nu step or bike 10 MIN  10 MIN  10 min  10 min  10 min  10 MIN  10 min  10 min  10 min  10 min    Standing ITB stretch:B: 20 sec x 5 20 sec x 5 NT NT NT NT NT NT HEP  20sec 5x B    Standing hip flexor stretch:B: 20 sec x 5 NT NT NT NT NT NT NT HEP  20sec 5x    LAQ:B: NT NT NT NT NT NT 30X 2SEC 2#  NT NT NT   Hip flexion:B: NT NT   NT NT  NT NT NT   Hamstring stretch forward and diagonal:B: 20 sec x 5 20SEC 5X  20SEC 5X  20SEC 5X  20sec 5x  20SEC 5X NT 20SEC 5X 20sec 5x 20SEC 5X   piriformis stretch:B: 20 sec x 5 20SEC 5X 20SEC 5X 20SEC 5X 20sec 5x  20SEC 5X NT 20SEC 5X 20sec 5x 20SEC 5X   LTR:B: 10 sec x 5 20X 3SEC  20X 3SEC  20X 3SEC  20x 3sec  20X 3SEC  NT 20X 3SEC  20x 3sec  20X 3SEC    DLS abdominal bracing:B: NT NT  30X 3SEC  30X 2SEC  30x 3sec  30X 3SEC  NT 30X 3SEC  30x 3sec  20X 2SEC    DLS abdominal bracing with slr flexion:B: 2 x 10 2 x 10 20X 2#  20X  20x 2sec  20X 20X 20X  20x 20X 3SEC    DLS abdominal bracing with hip flexion:B: 2 x 10 NT 20X 2#  20X 3SEC  20x 3sec  20X  20X 20X  20x 20X    Bridges 2 x 10 20X 3SEC  20X 3SEC  30X 3SEC  20x 3sec  [id number] 20X 3SEC  20X 3SEC  [id number]  20X 3SEC    Prone lying NT NT 3 MIN  3 MIN  3 min  3 MIN  3 MIN  3 MIN  3 min  3 MIN    Prone press ups:B: NT NT   20X  30x  30X 30X 30X 30x  30X    Prone hip IR and ER:B: NT NT  20x 2# NT NT 20X NT 30X NT 20X    Prone hip extension:B: NT NT NT NT NT NT NT NT NT NT   Curls  NT 20x     30x 2#  30X 2#  30X 3#  20X   SLRX3  2 x 10 20 x  20X 2#  20X  NT  NT 30X 2#  30X  30X 3#  20X   Mini squats with DLS:B: 3 x 10 20 x NT 20X  20X  NT 20X 20X 30X  30X   Lunges with DLS:B: NT NT NT NT NT NT NT NT NT 30X   Forward step ups:B: NT NT NT NT NT NT NT  NT NT NT   Lateral step ups:B: NT NT NT NT NT NT NT NT NT NT   Step downs:B: NT NT NT NT NT NT NT NT NT NT   Hip hiking:B: NT NT NT NT NT NT NT NT NT NT           NT NT NT   SLS and Tandem stance:B: NT NT NT NT NT NT NT NT NT NT   Tandem ambulation and side stepping in parallel bars or ballet bar with foam  NT NT NT NT NT NT NT NT NT NT                "[employer] balance system limits of stability  NT NT NT NT NT NT NT NT NT                             Modalities             MHP to bilateral hips seated or supine NT NT  12 MIN  NT NT NT NT NT NT CP sideline to R hip

## 2020-10-05 DIAGNOSIS — E26.1 SECONDARY HYPERALDOSTERONISM (HCC): ICD-10-CM

## 2020-10-05 DIAGNOSIS — I77.3 FIBROMUSCULAR DYSPLASIA (HCC): Primary | ICD-10-CM

## 2020-10-06 ENCOUNTER — OFFICE VISIT (OUTPATIENT)
Dept: NEPHROLOGY | Facility: CLINIC | Age: 70
End: 2020-10-06
Payer: MEDICARE

## 2020-10-06 VITALS — WEIGHT: 158 LBS | BODY MASS INDEX: 26.98 KG/M2 | HEIGHT: 64 IN

## 2020-10-06 DIAGNOSIS — E26.1 SECONDARY HYPERALDOSTERONISM (HCC): Primary | ICD-10-CM

## 2020-10-06 DIAGNOSIS — I15.0 RENOVASCULAR HYPERTENSION: ICD-10-CM

## 2020-10-06 DIAGNOSIS — I77.3 FIBROMUSCULAR DYSPLASIA (HCC): ICD-10-CM

## 2020-10-06 PROCEDURE — 99214 OFFICE O/P EST MOD 30 MIN: CPT | Performed by: INTERNAL MEDICINE

## 2020-10-15 ENCOUNTER — TRANSCRIBE ORDERS (OUTPATIENT)
Dept: PHYSICAL THERAPY | Age: 70
End: 2020-10-15

## 2020-10-15 DIAGNOSIS — M70.61 TROCHANTERIC BURSITIS OF RIGHT HIP: Primary | ICD-10-CM

## 2020-10-15 DIAGNOSIS — M70.62 TROCHANTERIC BURSITIS OF LEFT HIP: ICD-10-CM

## 2021-01-07 ENCOUNTER — OFFICE VISIT (OUTPATIENT)
Dept: URGENT CARE | Age: 71
End: 2021-01-07
Payer: MEDICARE

## 2021-01-07 VITALS — RESPIRATION RATE: 18 BRPM | TEMPERATURE: 96.7 F | OXYGEN SATURATION: 98 % | HEART RATE: 64 BPM

## 2021-01-07 DIAGNOSIS — J01.00 ACUTE NON-RECURRENT MAXILLARY SINUSITIS: Primary | ICD-10-CM

## 2021-01-07 PROCEDURE — 99213 OFFICE O/P EST LOW 20 MIN: CPT | Performed by: PHYSICIAN ASSISTANT

## 2021-01-07 PROCEDURE — U0005 INFEC AGEN DETEC AMPLI PROBE: HCPCS | Performed by: PHYSICIAN ASSISTANT

## 2021-01-07 PROCEDURE — U0003 INFECTIOUS AGENT DETECTION BY NUCLEIC ACID (DNA OR RNA); SEVERE ACUTE RESPIRATORY SYNDROME CORONAVIRUS 2 (SARS-COV-2) (CORONAVIRUS DISEASE [COVID-19]), AMPLIFIED PROBE TECHNIQUE, MAKING USE OF HIGH THROUGHPUT TECHNOLOGIES AS DESCRIBED BY CMS-2020-01-R: HCPCS | Performed by: PHYSICIAN ASSISTANT

## 2021-01-07 PROCEDURE — G0463 HOSPITAL OUTPT CLINIC VISIT: HCPCS | Performed by: PHYSICIAN ASSISTANT

## 2021-01-07 RX ORDER — AMOXICILLIN AND CLAVULANATE POTASSIUM 875; 125 MG/1; MG/1
1 TABLET, FILM COATED ORAL EVERY 12 HOURS SCHEDULED
Qty: 14 TABLET | Refills: 0 | Status: SHIPPED | OUTPATIENT
Start: 2021-01-07 | End: 2021-01-14

## 2021-01-07 NOTE — PROGRESS NOTES
[facility]        NAME: Deena Maharaj is a 69 y o  female  : 1950    MRN: [id number]  DATE: 2021  TIME: 2:21 PM    Assessment and Plan   Acute non-recurrent maxillary sinusitis [J01 00]  1  Acute non-recurrent maxillary sinusitis  amoxicillin-clavulanate (AUGMENTIN) 875-125 mg per tablet    Novel Coronavirus (COVID-19), PCR [facility] - Office Collection         Patient Instructions       Follow up with PCP in 3-5 days  Proceed to  ER if symptoms worsen  Chief Complaint     Chief Complaint   Patient presents with    Headache     head congestion x 2021    COVID-19     exposure to sister         History of Present Illness       Patient presents with sinus pain and pressure  She states on 1226 she started with a runny nose and sneezing after being exposed to some dust   She then states that her nose started getting clogged in her sinuses began with pain and pressure  She does complain of a sore throat from postnasal drip  She was around her sister on  for less than 5 minutes outside wall mass who later tested positive for COVID  She denies any fevers cough shortness of breath  Review of Systems   Review of Systems   Constitutional: Negative  HENT: Positive for postnasal drip, sinus pressure, sinus pain and sore throat  Respiratory: Negative  Cardiovascular: Negative  Gastrointestinal: Negative  Musculoskeletal: Negative  Neurological: Negative  Psychiatric/Behavioral: Negative            Current Medications       Current Outpatient Medications:     Albuterol Sulfate 108 (90 Base) MCG/ACT AEPB, Inhale 2 puffs every 4 (four) hours, Disp: , Rfl:     amoxicillin-clavulanate (AUGMENTIN) 875-125 mg per tablet, Take 1 tablet by mouth every 12 (twelve) hours for 7 days, Disp: 14 tablet, Rfl: 0    beclomethasone (QVAR) 40 MCG/ACT inhaler, Inhale 2 puffs, Disp: , Rfl:     brinzolamide (AZOPT) 1 % ophthalmic suspension, Azopt 1 % eye drops,suspension, Disp: , Rfl:     budesonide (ENTOCORT EC) 3 MG capsule, TAKE 3 CAPSULES DAILY  , Disp: , Rfl:     chlorhexidine (PERIDEX) 0 12 % solution, chlorhexidine gluconate 0 12 % mouthwash, Disp: , Rfl:     Cholecalciferol 5000 units capsule, Take 6,000 Units by mouth daily , Disp: , Rfl:     clobetasol (TEMOVATE) 0 05 % GEL, clobetasol 0 05 % topical gel, Disp: , Rfl:     estradiol (ESTRACE VAGINAL) 0 1 mg/g vaginal cream, Insert 8 65 application into the vagina once as needed, Disp: , Rfl:     fexofenadine (ALLEGRA ALLERGY) 180 MG tablet, Allegra, Disp: , Rfl:     losartan (COZAAR) 25 mg tablet, Take 25 mg by mouth daily , Disp: , Rfl:     LUMIGAN 0 01 % ophthalmic drops, Administer 0 001 drops to both eyes 2 (two) times a day, Disp: , Rfl: 11    methylPREDNISolone 4 MG tablet therapy pack, Use as directed on package (Patient not taking: Reported on 9/11/2019), Disp: 21 tablet, Rfl: 0    Omega-3 Fatty Acids (FISH OIL) 1,000 mg, Take 1,000 mg by mouth daily, Disp: , Rfl:     venlafaxine (EFFEXOR-XR) 37 5 mg 24 hr capsule, Take 37 5 mg by mouth once, Disp: , Rfl:     Venlafaxine HCl (EFFEXOR PO), Take 75 tablets by mouth once, Disp: , Rfl:     Current Allergies     Allergies as of 01/07/2021 - Reviewed 01/07/2021   Allergen Reaction Noted    Other  01/26/2016    Sulfa antibiotics Other (See Comments) 03/26/2010    Nabumetone Rash and Angioedema 04/16/2015            The following portions of the patient's history were reviewed and updated as appropriate: allergies, current medications, past family history, past medical history, past social history, past surgical history and problem list      Past Medical History:   Diagnosis Date    Asymptomatic spider vein     Collagenous colitis     Disc disorder     Female bladder prolapse     [facility]rom[facility] dysplasia [facility] [address] [facility]     FMD (facioscapulohumeral muscular dystrophy) [facility] [address] [facility]     Hypertension     Incomplete emptying of bladder     Osteoarthritis  [facility]aldosteronism [facility] [address] [facility]        Past Surgical History:   Procedure Laterality Date    HYSTERECTOMY      TUBAL LIGATION         Family History   Problem Relation Age of Onset    Gout Mother     Heart disease Mother     Hypertension Mother     Diabetes Father     Heart disease Father     Hypertension Father     Gout Sister     Heart disease Sister     Hypertension Sister     Gout Brother     Heart disease Brother     Hypertension Brother          Medications have been verified  Objective   Pulse 64   Temp (!) 96 7 °F (35 9 °C)   Resp 18   SpO2 98%        Physical Exam     Physical Exam  Vitals signs and nursing note reviewed  Constitutional:       General: She is not in acute distress  Appearance: Normal appearance  She is not ill-appearing or toxic-appearing  HENT:      Head:      Comments: Tenderness over sinuses     Right Ear: Tympanic membrane normal       Left Ear: Tympanic membrane normal    Cardiovascular:      Rate and Rhythm: Normal rate and regular rhythm  Pulses: Normal pulses  Heart sounds: Normal heart sounds  Pulmonary:      Effort: Pulmonary effort is normal       Breath sounds: Normal breath sounds  No wheezing  Skin:     General: Skin is warm and dry  Neurological:      General: No focal deficit present  Mental Status: She is alert and oriented to person, place, and time     Psychiatric:         Mood and Affect: Mood normal          Behavior: Behavior normal

## 2021-01-07 NOTE — PATIENT INSTRUCTIONS

## 2021-01-09 LAB — SARS-COV-2 RNA SPEC QL NAA+PROBE: NOT DETECTED

## 2021-03-04 DIAGNOSIS — Z23 ENCOUNTER FOR IMMUNIZATION: ICD-10-CM

## 2021-08-07 ENCOUNTER — OFFICE VISIT (OUTPATIENT)
Dept: URGENT CARE | Age: 71
End: 2021-08-07
Payer: MEDICARE

## 2021-08-07 VITALS — OXYGEN SATURATION: 96 % | HEART RATE: 63 BPM | TEMPERATURE: 98 F | RESPIRATION RATE: 16 BRPM

## 2021-08-07 DIAGNOSIS — J06.9 UPPER RESPIRATORY TRACT INFECTION, UNSPECIFIED TYPE: ICD-10-CM

## 2021-08-07 DIAGNOSIS — J02.9 SORE THROAT: Primary | ICD-10-CM

## 2021-08-07 LAB — S PYO AG THROAT QL: NEGATIVE

## 2021-08-07 PROCEDURE — 99213 OFFICE O/P EST LOW 20 MIN: CPT | Performed by: NURSE PRACTITIONER

## 2021-08-07 PROCEDURE — G0463 HOSPITAL OUTPT CLINIC VISIT: HCPCS | Performed by: NURSE PRACTITIONER

## 2021-08-07 PROCEDURE — 87070 CULTURE OTHR SPECIMN AEROBIC: CPT | Performed by: NURSE PRACTITIONER

## 2021-08-07 PROCEDURE — 87880 STREP A ASSAY W/OPTIC: CPT | Performed by: NURSE PRACTITIONER

## 2021-08-07 NOTE — PROGRESS NOTES
[facility]        NAME: Usha Parekh is a 66 y o  female  : 1950    MRN: [id number]  DATE: 2021  TIME: 12:41 PM    Assessment and Plan   Sore throat [J02 9]  1  Sore throat  POCT rapid strepA    Throat culture   2  Upper respiratory tract infection, unspecified type           Patient Instructions     Rapid strep is negative; will send for culture  Follow up with PCP in 3-5 days  Proceed to  ER if symptoms worsen  Chief Complaint     Chief Complaint   Patient presents with    Sore Throat     x several days    Cough    Nasal Congestion    Headache         History of Present Illness       HPI   Cold symptoms times 2-3 days  Includes sore throat, cough, was congestion and headache  Mild discomfort in the ears  COVID tested about 5 days ago and was negative  Has received the COVID vaccine  Grandchild had similar symptoms and was treated generically for strep throat  No strep test was done  No recent travel    Review of Systems   Review of Systems   Constitutional: Negative for chills and fever  HENT: Positive for congestion, rhinorrhea and sore throat  Respiratory: Positive for cough  Negative for chest tightness, shortness of breath and wheezing  Cardiovascular: Negative for chest pain  Gastrointestinal: Negative for diarrhea, nausea and vomiting  Neurological: Negative for headaches  Current Medications       Current Outpatient Medications:     Dicyclomine HCl (BENTYL PO), Take by mouth, Disp: , Rfl:     Albuterol Sulfate 108 (90 Base) MCG/ACT AEPB, Inhale 2 puffs every 4 (four) hours, Disp: , Rfl:     beclomethasone (QVAR) 40 MCG/ACT inhaler, Inhale 2 puffs, Disp: , Rfl:     brinzolamide (AZOPT) 1 % ophthalmic suspension, Azopt 1 % eye drops,suspension, Disp: , Rfl:     budesonide (ENTOCORT EC) 3 MG capsule, TAKE 3 CAPSULES DAILY  , Disp: , Rfl:     chlorhexidine (PERIDEX) 0 12 % solution, chlorhexidine gluconate 0 12 % mouthwash, Disp: , Rfl:    Cholecalciferol 5000 units capsule, Take 6,000 Units by mouth daily , Disp: , Rfl:     clobetasol (TEMOVATE) 0 05 % GEL, clobetasol 0 05 % topical gel, Disp: , Rfl:     estradiol (ESTRACE VAGINAL) 0 1 mg/g vaginal cream, Insert 3 85 application into the vagina once as needed, Disp: , Rfl:     fexofenadine (ALLEGRA ALLERGY) 180 MG tablet, Allegra, Disp: , Rfl:     losartan (COZAAR) 25 mg tablet, Take 25 mg by mouth daily , Disp: , Rfl:     LUMIGAN 0 01 % ophthalmic drops, Administer 0 001 drops to both eyes 2 (two) times a day, Disp: , Rfl: 11    methylPREDNISolone 4 MG tablet therapy pack, Use as directed on package (Patient not taking: Reported on 9/11/2019), Disp: 21 tablet, Rfl: 0    Omega-3 Fatty Acids (FISH OIL) 1,000 mg, Take 1,000 mg by mouth daily, Disp: , Rfl:     venlafaxine (EFFEXOR-XR) 37 5 mg 24 hr capsule, Take 37 5 mg by mouth once, Disp: , Rfl:     Venlafaxine HCl (EFFEXOR PO), Take 75 tablets by mouth once, Disp: , Rfl:     Current Allergies     Allergies as of 08/07/2021 - Reviewed 08/07/2021   Allergen Reaction Noted    Other  01/26/2016    Sulfa antibiotics Other (See Comments) 03/26/2010    Nabumetone Rash and Angioedema 04/16/2015            The following portions of the patient's history were reviewed and updated as appropriate: allergies, current medications, past family history, past medical history, past social history, past surgical history and problem list      Past Medical History:   Diagnosis Date    Asymptomatic spider vein     Collagenous colitis     Disc disorder     Female bladder prolapse     [facility]romuscular dysplasia [facility] [address] [facility]     FMD (facioscapulohumeral muscular dystrophy) ([facility] [address] [facility]     Hypertension     Incomplete emptying of bladder     Osteoarthritis     Secondary hyperaldosteronism ([facility] [address] [facility]        Past Surgical History:   Procedure Laterality Date    HYSTERECTOMY      TUBAL LIGATION         Family History   Problem Relation Age of Onset    Gout Mother     Heart disease Mother     Hypertension Mother     Diabetes Father     Heart disease Father     Hypertension Father     Gout Sister     Heart disease Sister     Hypertension Sister     Gout Brother     Heart disease Brother     Hypertension Brother          Medications have been verified  Objective   Pulse 63   Temp 98 °F (36 7 °C)   Resp 16   SpO2 96%   No LMP recorded  Patient has had a hysterectomy  Physical Exam     Physical Exam  Constitutional:       Appearance: She is not ill-appearing or diaphoretic  HENT:      Right Ear: Tympanic membrane and ear canal normal       Left Ear: Tympanic membrane and ear canal normal       Nose: Rhinorrhea present  Mouth/Throat:      Mouth: Mucous membranes are moist       Pharynx: No posterior oropharyngeal erythema  Tonsils: No tonsillar exudate  0 on the right  0 on the left  Cardiovascular:      Rate and Rhythm: Regular rhythm  Pulmonary:      Effort: Pulmonary effort is normal       Breath sounds: Normal breath sounds  Lymphadenopathy:      Cervical: No cervical adenopathy

## 2021-08-09 LAB — BACTERIA THROAT CULT: NORMAL

## 2021-11-03 ENCOUNTER — APPOINTMENT (OUTPATIENT)
Dept: LAB | Facility: MEDICAL CENTER | Age: 71
End: 2021-11-03
Payer: MEDICARE

## 2021-11-03 ENCOUNTER — TELEPHONE (OUTPATIENT)
Dept: NEPHROLOGY | Facility: CLINIC | Age: 71
End: 2021-11-03

## 2021-11-03 DIAGNOSIS — J01.90 ACUTE SINUSITIS, RECURRENCE NOT SPECIFIED, UNSPECIFIED LOCATION: ICD-10-CM

## 2021-11-03 DIAGNOSIS — E26.1 SECONDARY HYPERALDOSTERONISM (HCC): ICD-10-CM

## 2021-11-03 DIAGNOSIS — I15.0 RENOVASCULAR HYPERTENSION: ICD-10-CM

## 2021-11-03 DIAGNOSIS — E26.1 SECONDARY HYPERALDOSTERONISM (HCC): Primary | ICD-10-CM

## 2021-11-03 LAB
ALBUMIN SERPL BCP-MCNC: 3.6 G/DL (ref 3.5–5)
ALP SERPL-CCNC: 75 U/L (ref 46–116)
ALT SERPL W P-5'-P-CCNC: 37 U/L (ref 12–78)
ANION GAP SERPL CALCULATED.3IONS-SCNC: 4 MMOL/L (ref 4–13)
AST SERPL W P-5'-P-CCNC: 13 U/L (ref 5–45)
BACTERIA UR QL AUTO: ABNORMAL /HPF
BILIRUB SERPL-MCNC: 0.48 MG/DL (ref 0.2–1)
BILIRUB UR QL STRIP: NEGATIVE
BUN SERPL-MCNC: 16 MG/DL (ref 5–25)
CALCIUM SERPL-MCNC: 9.8 MG/DL (ref 8.3–10.1)
CHLORIDE SERPL-SCNC: 107 MMOL/L (ref 100–108)
CLARITY UR: CLEAR
CO2 SERPL-SCNC: 26 MMOL/L (ref 21–32)
COLOR UR: YELLOW
CREAT SERPL-MCNC: 0.6 MG/DL (ref 0.6–1.3)
CREAT UR-MCNC: 142 MG/DL
GFR SERPL CREATININE-BSD FRML MDRD: 91 ML/MIN/1.73SQ M
GLUCOSE P FAST SERPL-MCNC: 79 MG/DL (ref 65–99)
GLUCOSE UR STRIP-MCNC: NEGATIVE MG/DL
HGB UR QL STRIP.AUTO: NEGATIVE
KETONES UR STRIP-MCNC: NEGATIVE MG/DL
LEUKOCYTE ESTERASE UR QL STRIP: ABNORMAL
MAGNESIUM SERPL-MCNC: 2.7 MG/DL (ref 1.6–2.6)
MUCOUS THREADS UR QL AUTO: ABNORMAL
NITRITE UR QL STRIP: NEGATIVE
NON-SQ EPI CELLS URNS QL MICRO: ABNORMAL /HPF
PH UR STRIP.AUTO: 6.5 [PH]
PHOSPHATE SERPL-MCNC: 3.5 MG/DL (ref 2.3–4.1)
POTASSIUM SERPL-SCNC: 4.2 MMOL/L (ref 3.5–5.3)
PROT SERPL-MCNC: 6.7 G/DL (ref 6.4–8.2)
PROT UR STRIP-MCNC: NEGATIVE MG/DL
PROT UR-MCNC: 11 MG/DL
PROT/CREAT UR: 0.08 MG/G{CREAT} (ref 0–0.1)
RBC #/AREA URNS AUTO: ABNORMAL /HPF
SODIUM SERPL-SCNC: 137 MMOL/L (ref 136–145)
SP GR UR STRIP.AUTO: 1.02 (ref 1–1.03)
UROBILINOGEN UR QL STRIP.AUTO: 1 E.U./DL
WBC #/AREA URNS AUTO: ABNORMAL /HPF

## 2021-11-03 PROCEDURE — 80053 COMPREHEN METABOLIC PANEL: CPT | Performed by: INTERNAL MEDICINE

## 2021-11-03 PROCEDURE — 84156 ASSAY OF PROTEIN URINE: CPT | Performed by: INTERNAL MEDICINE

## 2021-11-03 PROCEDURE — 36415 COLL VENOUS BLD VENIPUNCTURE: CPT | Performed by: INTERNAL MEDICINE

## 2021-11-03 PROCEDURE — 84100 ASSAY OF PHOSPHORUS: CPT | Performed by: INTERNAL MEDICINE

## 2021-11-03 PROCEDURE — 81001 URINALYSIS AUTO W/SCOPE: CPT | Performed by: INTERNAL MEDICINE

## 2021-11-03 PROCEDURE — 82570 ASSAY OF URINE CREATININE: CPT | Performed by: INTERNAL MEDICINE

## 2021-11-03 PROCEDURE — 83735 ASSAY OF MAGNESIUM: CPT | Performed by: INTERNAL MEDICINE

## 2021-11-17 ENCOUNTER — OFFICE VISIT (OUTPATIENT)
Dept: NEPHROLOGY | Facility: CLINIC | Age: 71
End: 2021-11-17
Payer: MEDICARE

## 2021-11-17 VITALS
DIASTOLIC BLOOD PRESSURE: 72 MMHG | SYSTOLIC BLOOD PRESSURE: 132 MMHG | WEIGHT: 161 LBS | BODY MASS INDEX: 27.49 KG/M2 | HEART RATE: 69 BPM | HEIGHT: 64 IN | RESPIRATION RATE: 16 BRPM

## 2021-11-17 DIAGNOSIS — E26.1 SECONDARY HYPERALDOSTERONISM (HCC): ICD-10-CM

## 2021-11-17 DIAGNOSIS — I15.0 RENOVASCULAR HYPERTENSION: ICD-10-CM

## 2021-11-17 DIAGNOSIS — I77.3 FIBROMUSCULAR DYSPLASIA (HCC): Primary | ICD-10-CM

## 2021-11-17 PROCEDURE — 99214 OFFICE O/P EST MOD 30 MIN: CPT | Performed by: INTERNAL MEDICINE

## 2022-01-26 ENCOUNTER — OFFICE VISIT (OUTPATIENT)
Dept: PHYSICAL THERAPY | Age: 72
End: 2022-01-26
Payer: MEDICARE

## 2022-01-26 DIAGNOSIS — M54.16 LUMBAR RADICULOPATHY: Primary | ICD-10-CM

## 2022-01-26 PROCEDURE — 97140 MANUAL THERAPY 1/> REGIONS: CPT

## 2022-01-26 PROCEDURE — 97162 PT EVAL MOD COMPLEX 30 MIN: CPT

## 2022-01-27 NOTE — PROGRESS NOTES
PT Evaluation     Today's date: 2022  Patient name: Maria D Peters  : 1950  MRN: [id number]  Referring provider: Darío Bourgeois PT  Dx:   Encounter Diagnosis     ICD-10-CM    1  Lumbar radiculopathy  M54 16                   Assessment/Plan    Subjective Evaluation    History of Present Illness  Onset date:  onset with exacerbation Dec 15th 2021 left groin, low back            Recurrent probem    Quality of life: good    Pain  Current pain ratin  At best pain ratin  At worst pain ratin  Location: left groin, low back pain , right and left bilateral si pain  Quality: dull ache, sharp, radiating, pressure, tight and pulling  Relieving factors: change in position, heat and rest  Aggravating factors: stair climbing, walking, standing, sitting and lifting  Progression: worsening    Social Support  Steps to enter house: yes  Stairs in house: yes   Lives in: multiple-level home  Lives with: spouse    Employment status: working (full time self employed building company)  Hand dominance: right  Exercise history: more sedentary in nature lately    Treatments  Previous treatment: physical therapy  Current treatment: physical therapy  Patient Goals  Patient goals for therapy: decreased pain, increased motion, increased strength, independence with ADLs/IADLs, return to sport/leisure activities and improved balance  Patient goal: reduction in pain by 50 percent in 4 weeks         Objective           Precautions: allergies: sulfa antibiotics, other many inhalants carries epi pen, nabumetone      Manuals 22             I eval                          Manual direct left psoas release  Man 15 min                          Neuro Re-Ed             Quad stretch in prone             Hamstring stretch in supine              Bent knee fall outs             dls                                                    Ther Ex/ aquatic exer              Water walking fwd/ bwd, laps  Sitting long sit stretch             Standing quad stretch             Sitting laq, hip flexion , hip abd /add ashanti cross, hip int and ext rot, leg thrusts             Sitting adductor stretch on bench             Standing heelraises, hip abd/add, knee flexion , hip flex /ext             1/2 jj, jogging in place with small dumbells, cross country             Step ups              Ther Activity             sidestepping in shallow section laps 5                          Gait Training                                       Modalities             Heat to ls and left hp/groin prn

## 2022-01-28 ENCOUNTER — OFFICE VISIT (OUTPATIENT)
Dept: PHYSICAL THERAPY | Age: 72
End: 2022-01-28
Payer: MEDICARE

## 2022-01-28 DIAGNOSIS — M54.16 LUMBAR RADICULOPATHY: Primary | ICD-10-CM

## 2022-01-28 PROCEDURE — 97113 AQUATIC THERAPY/EXERCISES: CPT

## 2022-01-28 NOTE — PROGRESS NOTES
Daily Note     Today's date: 2022  Patient name: Thelma Slade  : 1950  MRN: [id number]  Referring provider: Denise Gavin PT  Dx:   Encounter Diagnosis     ICD-10-CM    1  Lumbar radiculopathy  M54 16                   Subjective: "i'm looking forward to the pool therapy today "      Objective: See treatment diary below      Assessment: Tolerated treatment well  Patient exhibited good technique with therapeutic exercises      Plan: Continue per plan of care        Precautions: allergies: sulfa antibiotics, other many inhalants carries epi pen, nabumetone      Manuals 22            I eval                          Manual direct left psoas release  Man 15 min                          Neuro Re-Ed             Quad stretch in prone             Hamstring stretch in supine              Bent knee fall outs             dls                                                    Ther Ex/ aquatic exer              Water walking fwd/ bwd, laps 5/5  5 laps forward only shallows           Sitting long sit stretch  5 reps 20 sec hold            Standing quad stretch             Sitting laq, hip flexion , hip abd /add ashanti cross, hip int and ext rot, leg thrusts  3 x 10     Leg thrusts 1 min           Sitting adductor stretch on bench             Standing heelraises, hip abd/add, knee flexion , hip flex /ext  3 x 10           1/2 jj, jogging in place with small dumbells, cross country  30 reps  no dumbells, did marching 30 reps            Flutter kicks over yellow float  5 min           Ther Activity             sidestepping in shallow section laps 5             Step ups             Gait Training                                       Modalities             Heat to ls and left hp/groin prn

## 2022-02-02 ENCOUNTER — APPOINTMENT (OUTPATIENT)
Dept: PHYSICAL THERAPY | Age: 72
End: 2022-02-02
Payer: MEDICARE

## 2022-02-04 ENCOUNTER — OFFICE VISIT (OUTPATIENT)
Dept: PHYSICAL THERAPY | Age: 72
End: 2022-02-04
Payer: MEDICARE

## 2022-02-04 DIAGNOSIS — M54.16 LUMBAR RADICULOPATHY: Primary | ICD-10-CM

## 2022-02-04 DIAGNOSIS — M25.552 LEFT HIP PAIN: ICD-10-CM

## 2022-02-04 PROCEDURE — 97140 MANUAL THERAPY 1/> REGIONS: CPT

## 2022-02-04 PROCEDURE — 97014 ELECTRIC STIMULATION THERAPY: CPT

## 2022-02-04 NOTE — PROGRESS NOTES
Daily Note     Today's date: 2022  Patient name: Laureen Perry  : 1950  MRN: [id number]  Referring provider: Mervat Morgan, PT  Dx:   Encounter Diagnosis     ICD-10-CM    1  Lumbar radiculopathy  M54 16    2  Left hip pain  M25 552                   Subjective: Pool not available this am   Pt now on steroids for left hip and low back pain with decreased pain noted  Objective: See treatment diary below      Assessment: Tolerated treatment well  Patient would benefit from continued PT  PT able to walk with nonantalgic gait upon leaving PT today  Noted decrease in left hip and low back pain  Plan: Continue per plan of care        Precautions: allergies: sulfa antibiotics, other many inhalants carries epi pen, nabumetone      Manuals 22           I eval                          Manual direct left psoas release  Man 15 min   Man  , ql, psoas, quad, ITB , glut and pirformis left          Soft tissue massage and mobilization techniques thoracolumbar spine and left hip    Man  Total 60 min           Neuro Re-Ed             Quad stretch in prone             Hamstring stretch in supine              Bent knee fall outs             dls                                                    Ther Ex/ aquatic exer              Water walking fwd/ bwd, laps 5/5  5 laps forward only shallows           Sitting long sit stretch  5 reps 20 sec hold            Standing quad stretch             Sitting laq, hip flexion , hip abd /add ashanti cross, hip int and ext rot, leg thrusts  3 x 10     Leg thrusts 1 min           Sitting adductor stretch on bench             Standing heelraises, hip abd/add, knee flexion , hip flex /ext  3 x 10           1/2 jj, jogging in place with small dumbells, cross country  30 reps  no dumbells, did marching 30 reps            Flutter kicks over yellow float  5 min           Ther Activity             sidestepping in shallow section laps 5             Step ups Gait Training                                       Modalities             Heat to ls and left hp/groin prn and TENS smp mode  estim charged low back and leg hip 15 min

## 2022-02-07 ENCOUNTER — OFFICE VISIT (OUTPATIENT)
Dept: PHYSICAL THERAPY | Age: 72
End: 2022-02-07
Payer: MEDICARE

## 2022-02-07 DIAGNOSIS — M25.552 LEFT HIP PAIN: ICD-10-CM

## 2022-02-07 DIAGNOSIS — M54.16 LUMBAR RADICULOPATHY: Primary | ICD-10-CM

## 2022-02-07 PROCEDURE — 97113 AQUATIC THERAPY/EXERCISES: CPT

## 2022-02-07 NOTE — PROGRESS NOTES
Daily Note     Today's date: 2022  Patient name: Ellie Ruiz  : 1950  MRN: [id number]  Referring provider: Gregorio Bhardwaj, PT  Dx:   Encounter Diagnosis     ICD-10-CM    1  Lumbar radiculopathy  M54 16    2  Left hip pain  M25 552                   Subjective: "I feel good when I'm in the pool, it doesn't hurt "      Objective: See treatment diary below      Assessment: Tolerated treatment well  Patient would benefit from continued PT      Plan: Continue per plan of care        Precautions: allergies: sulfa antibiotics, other many inhalants carries epi pen, nabumetone      Manuals 22          I eval                          Manual direct left psoas release  Man 15 min   Man  , ql, psoas, quad, ITB , glut and pirformis left          Soft tissue massage and mobilization techniques thoracolumbar spine and left hip    Man  Total 60 min           Neuro Re-Ed             Quad stretch in prone             Hamstring stretch in supine              Bent knee fall outs             dls                                                    Ther Ex/ aquatic exer              Water walking fwd/ bwd, laps 5/5  5 laps forward only shallows  5/5         Sitting long sit stretch  5 reps 20 sec hold   5 hold 20 sec          Standing quad stretch             Sitting laq, hip flexion , hip abd /add ashanti cross, hip int and ext rot, leg thrusts  3 x 10     Leg thrusts 1 min  3 x 10           Sitting adductor stretch on bench             Standing heelraises, hip abd/add, knee flexion , hip flex /ext  3 x 10  3 x 10         1/2 jj, push/pull jogging in place with small dumbells, cross country  30 reps  no dumbells, did marching 30 reps    1min each reps with small dumbbells         Flutter kicks over yellow float  5 min   5min         Ther Activity             sidestepping in shallow section laps 5    5 laps         Step ups    3 x 10         Gait Training                                       Modalities Heat to ls and left hp/groin prn and TENS smp mode  estim charged low back and leg hip 15 min

## 2022-02-10 ENCOUNTER — OFFICE VISIT (OUTPATIENT)
Dept: PHYSICAL THERAPY | Age: 72
End: 2022-02-10
Payer: MEDICARE

## 2022-02-10 DIAGNOSIS — M54.16 LUMBAR RADICULOPATHY: Primary | ICD-10-CM

## 2022-02-10 PROCEDURE — 97113 AQUATIC THERAPY/EXERCISES: CPT

## 2022-02-10 NOTE — PROGRESS NOTES
Daily Note     Today's date: 2/10/2022  Patient name: Oanh Little  : 1950  MRN: [id number]  Referring provider: Stevan Grande PT  Dx:   Encounter Diagnosis     ICD-10-CM    1  Lumbar radiculopathy  M54 16                   Subjective: Pt  Reports her groin pain is gone and low back pain is lessening  Objective: See treatment diary below      Assessment: Tolerated treatment well  Patient would benefit from continued PT      Plan: Continue per plan of care        Precautions: allergies: sulfa antibiotics, other many inhalants carries epi pen, nabumetone      Manuals 1/26/22 1/28 2/4/22 2/7 2/10         I eval                          Manual direct left psoas release  Man 15 min   Man  , ql, psoas, quad, ITB , glut and pirformis left          Soft tissue massage and mobilization techniques thoracolumbar spine and left hip    Man  Total 60 min           Neuro Re-Ed             Quad stretch in prone             Hamstring stretch in supine              Bent knee fall outs             dls                                                    Ther Ex/ aquatic exer              Water walking fwd/ bwd, laps 5/5  5 laps forward only shallows  5/5 5/5        Sitting long sit stretch  5 reps 20 sec hold   5 hold 20 sec  20sec x 5        Standing quad stretch             Sitting laq, hip flexion , hip abd /add ashanti cross, hip int and ext rot, leg thrusts  3 x 10     Leg thrusts 1 min  3 x 10   30x        Sitting adductor stretch on bench     15sec x 5        Standing heelraises, hip abd/add, knee flexion , hip flex /ext  3 x 10  3 x 10 30x        1/2 jj, push/pull jogging in place with small dumbells, cross country  30 reps  no dumbells, did marching 30 reps    1min each reps with small dumbbells         Flutter kicks over yellow float  5 min   5min         Ther Activity             sidestepping in shallow section laps 5    5 laps         Step ups    3 x 10 30x        Gait Training Modalities             Heat to ls and left hp/groin prn and TENS smp mode  estim charged low back and leg hip 15 min

## 2022-02-14 ENCOUNTER — OFFICE VISIT (OUTPATIENT)
Dept: PHYSICAL THERAPY | Age: 72
End: 2022-02-14
Payer: MEDICARE

## 2022-02-14 DIAGNOSIS — M25.552 LEFT HIP PAIN: ICD-10-CM

## 2022-02-14 DIAGNOSIS — M54.16 LUMBAR RADICULOPATHY: Primary | ICD-10-CM

## 2022-02-14 PROCEDURE — 97113 AQUATIC THERAPY/EXERCISES: CPT

## 2022-02-14 NOTE — PROGRESS NOTES
Daily Note     Today's date: 2022  Patient name: Claudia Vee  : 1950  MRN: [id number]  Referring provider: Karishma Cruz PT  Dx:   Encounter Diagnosis     ICD-10-CM    1  Lumbar radiculopathy  M54 16    2  Left hip pain  M25 552                   Subjective: "My left knee is reacting to the leg pendulum exer in the pool I think "      Objective: See treatment diary below      Assessment: Tolerated treatment well  Patient would benefit from continued PT      Plan: Continue per plan of care        Precautions: allergies: sulfa antibiotics, other many inhalants carries epi pen, nabumetone      Manuals 1/26/22 1/28 2/4/22 2/7 2/10 2/14        I eval                          Manual direct left psoas release  Man 15 min   Man  , ql, psoas, quad, ITB , glut and pirformis left          Soft tissue massage and mobilization techniques thoracolumbar spine and left hip    Man  Total 60 min           Neuro Re-Ed             Quad stretch in prone             Hamstring stretch in supine              Bent knee fall outs             dls                                                    Ther Ex/ aquatic exer              Water walking fwd/ bwd, laps 5/5  5 laps forward only shallows  5/5 5/5 5/5       Sitting long sit stretch  5 reps 20 sec hold   5 hold 20 sec  20sec x 5 20 sec x 5       Standing quad stretch             Sitting laq, hip flexion , hip abd /add ashanti cross, hip int and ext rot, leg thrusts  3 x 10     Leg thrusts 1 min  3 x 10   30x 3 x 10  No int /ext rotation       Sitting adductor stretch on bench     15sec x 5 30 se c x3       Standing heelraises, hip abd/add, knee flexion , hip flex /ext  3 x 10  3 x 10 30x 3 x 10        1/2 jj, push/pull jogging in place with small dumbells, cross country  30 reps  no dumbells, did marching 30 reps    1min each reps with small dumbbells  3 min each       Flutter kicks over yellow float  5 min   5min   5min       Ther Activity             sidestepping in shallow section laps 5    5 laps         Step ups    3 x 10 30x 3 x 10       Gait Training                                       Modalities             Heat to ls and left hp/groin prn and TENS smp mode  estim charged low back and leg hip 15 min

## 2022-02-17 ENCOUNTER — OFFICE VISIT (OUTPATIENT)
Dept: PHYSICAL THERAPY | Age: 72
End: 2022-02-17
Payer: MEDICARE

## 2022-02-17 DIAGNOSIS — M54.16 LUMBAR RADICULOPATHY: Primary | ICD-10-CM

## 2022-02-17 DIAGNOSIS — M25.552 LEFT HIP PAIN: ICD-10-CM

## 2022-02-17 PROCEDURE — 97113 AQUATIC THERAPY/EXERCISES: CPT | Performed by: PHYSICAL THERAPY ASSISTANT

## 2022-02-17 NOTE — PROGRESS NOTES
Daily Note     Today's date: 2022  Patient name: Danielle Miller  : 1950  MRN: [id number]  Referring provider: Tarsha Murrieta, PT  Dx:   Encounter Diagnosis     ICD-10-CM    1  Lumbar radiculopathy  M54 16    2  Left hip pain  M25 552                   Subjective: No new complaints  Objective: See treatment diary below      Assessment: Tolerated treatment well  Patient would benefit from continued PT      Plan: Continue per plan of care  Precautions: allergies: sulfa antibiotics, other many inhalants carries epi pen, nabumetone      Manuals 1/26/22 1/28 2/4/22 2/7 2/10 2/14 2/17       I eval                          Manual direct left psoas release  Man 15 min   Man  , ql, psoas, quad, ITB , glut and pirformis left          Soft tissue massage and mobilization techniques thoracolumbar spine and left hip    Man  Total 60 min           Neuro Re-Ed             Quad stretch in prone             Hamstring stretch in supine              Bent knee fall outs             dls                                                    Ther Ex/ aquatic exer              Water walking fwd/ bwd, laps 5/5  5 laps forward only shallows  5/5 5/5 5/5 5/5      Sitting long sit stretch  5 reps 20 sec hold   5 hold 20 sec  20sec x 5 20 sec x 5 20"x5      Standing quad stretch             Sitting laq, hip flexion , hip abd /add ashanti cross, hip int and ext rot, leg thrusts  3 x 10     Leg thrusts 1 min  3 x 10   30x 3 x 10  No int /ext rotation 3x10      Sitting adductor stretch on bench     15sec x 5 30 se c x3 30"x3      Standing heelraises, hip abd/add, knee flexion , hip flex /ext  3 x 10  3 x 10 30x 3 x 10 8   3x10      1/2 jj,30 push/pull jogging in place with small dumbells, cross country  30 reps  no dumbells, did marching 30 reps    1min each reps with small dumbbells  3 min each 3 min ea      Flutter kicks over yellow float  5 min   5min   5min 5 min      Ther Activity             sidestepping in shallow section laps 5    5 laps         Step ups    3 x 10 30x 3 x 10 3x10      Gait Training                                       Modalities             Heat to ls and left hp/groin prn and TENS smp mode  estim charged low back and leg hip 15 min

## 2022-02-22 ENCOUNTER — APPOINTMENT (OUTPATIENT)
Dept: PHYSICAL THERAPY | Age: 72
End: 2022-02-22
Payer: MEDICARE

## 2022-02-24 ENCOUNTER — OFFICE VISIT (OUTPATIENT)
Dept: PHYSICAL THERAPY | Age: 72
End: 2022-02-24
Payer: MEDICARE

## 2022-02-24 DIAGNOSIS — M54.16 LUMBAR RADICULOPATHY: Primary | ICD-10-CM

## 2022-02-24 PROCEDURE — 97113 AQUATIC THERAPY/EXERCISES: CPT

## 2022-02-24 NOTE — PROGRESS NOTES
Daily Note     Today's date: 2022  Patient name: Flor Isaac  : 1950  MRN: [id number]  Referring provider: Belem Winslow, PT  Dx:   Encounter Diagnosis     ICD-10-CM    1  Lumbar radiculopathy  M54 16                   Subjective: Pt  Reports the steroids have reduced the pain in her back  Objective: See treatment diary below      Assessment: Tolerated treatment well  Patient would benefit from continued PT      Plan: Continue per plan of care  Precautions: allergies: sulfa antibiotics, other many inhalants carries epi pen, nabumetone      Manuals 1/26/22 1/28 2/4/22 2/7 2/10 2/14 2/17 2/24      I eval                          Manual direct left psoas release  Man 15 min   Man  , ql, psoas, quad, ITB , glut and pirformis left          Soft tissue massage and mobilization techniques thoracolumbar spine and left hip    Man  Total 60 min           Neuro Re-Ed             Quad stretch in prone             Hamstring stretch in supine              Bent knee fall outs             dls                                                    Ther Ex/ aquatic exer              Water walking fwd/ bwd, laps 5/5  5 laps forward only shallows  5/5 5/5 5/5 5/5 5/5     Sitting long sit stretch/AKTC  5 reps 20 sec hold   5 hold 20 sec  20sec x 5 20 sec x 5 20"x5 20sec x 5     Standing quad stretch             Sitting laq, hip flexion , hip abd /add ashanti cross, hip int and ext rot, leg thrusts  3 x 10     Leg thrusts 1 min  3 x 10   30x 3 x 10  No int /ext rotation 3x10 30x     Sitting adductor stretch on bench     15sec x 5 30 se c x3 30"x3 30sec x 3     Standing heelraises, hip abd/add, knee flexion , hip flex /ext  3 x 10  3 x 10 30x 3 x 10 8   3x10 30x     1/2 jj,30 push/pull jogging in place with small dumbells, cross country  30 reps  no dumbells, did marching 30 reps    1min each reps with small dumbbells  3 min each 3 min ea 3 min     Flutter kicks over yellow float  5 min   5min   5min 5 min 5 min Ther Activity             sidestepping in shallow section laps 5    5 laps    5 laps     Step ups    3 x 10 30x 3 x 10 3x10 20x     Gait Training                                       Modalities             Heat to ls and left hp/groin prn and TENS smp mode  estim charged low back and leg hip 15 min

## 2022-02-28 ENCOUNTER — OFFICE VISIT (OUTPATIENT)
Dept: PHYSICAL THERAPY | Age: 72
End: 2022-02-28
Payer: MEDICARE

## 2022-02-28 DIAGNOSIS — M54.16 LUMBAR RADICULOPATHY: Primary | ICD-10-CM

## 2022-02-28 PROCEDURE — 97113 AQUATIC THERAPY/EXERCISES: CPT

## 2022-02-28 NOTE — PROGRESS NOTES
Daily Note     Today's date: 2022  Patient name: Pradeep Britt  : 1950  MRN: [id number]  Referring provider: Naren Uriostegui PT  Dx:   Encounter Diagnosis     ICD-10-CM    1  Lumbar radiculopathy  M54 16                   Subjective: Pt  Reports her low back is very tight from sitting at a desk most of the day yesterday  Objective: See treatment diary below      Assessment: Tolerated treatment well  Patient would benefit from continued PT      Plan: Continue per plan of care  Precautions: allergies: sulfa antibiotics, other many inhalants carries epi pen, nabumetone      Manuals 1/26/22 1/28 2/4/22 2/7 2/10 2/14 2/17 2/24 2/28     I eval                          Manual direct left psoas release  Man 15 min   Man  , ql, psoas, quad, ITB , glut and pirformis left          Soft tissue massage and mobilization techniques thoracolumbar spine and left hip    Man  Total 60 min           Neuro Re-Ed             Quad stretch in prone             Hamstring stretch in supine              Bent knee fall outs             dls                                                    Ther Ex/ aquatic exer              Water walking fwd/ bwd, laps 5/5  5 laps forward only shallows  5/5 5/5 5/5 5/5 5/5 5/5    Sitting long sit stretch/AKTC  5 reps 20 sec hold   5 hold 20 sec  20sec x 5 20 sec x 5 20"x5 20sec x 5 20sec x 5    Standing quad stretch             Sitting laq, hip flexion , hip abd /add ashanti cross, hip int and ext rot, leg thrusts  3 x 10     Leg thrusts 1 min  3 x 10   30x 3 x 10  No int /ext rotation 3x10 30x 30x    Sitting adductor stretch on bench     15sec x 5 30 se c x3 30"x3 30sec x 3 30x    Standing heelraises, hip abd/add, knee flexion , hip flex /ext  3 x 10  3 x 10 30x 3 x 10 8   3x10 30x 30x    1/2 jj,30 push/pull jogging in place with small dumbells, cross country  30 reps  no dumbells, did marching 30 reps    1min each reps with small dumbbells  3 min each 3 min ea 3 min 3 min    Flutter kicks over yellow float  5 min   5min   5min 5 min 5 min 5 min    Ther Activity             sidestepping in shallow section laps 5    5 laps    5 laps     Step ups    3 x 10 30x 3 x 10 3x10 20x 20x    Gait Training                                       Modalities             Heat to ls and left hp/groin prn and TENS smp mode  estim charged low back and leg hip 15 min

## 2022-03-03 ENCOUNTER — OFFICE VISIT (OUTPATIENT)
Dept: PHYSICAL THERAPY | Age: 72
End: 2022-03-03
Payer: MEDICARE

## 2022-03-03 DIAGNOSIS — M54.16 LUMBAR RADICULOPATHY: Primary | ICD-10-CM

## 2022-03-03 PROCEDURE — 97113 AQUATIC THERAPY/EXERCISES: CPT

## 2022-03-03 NOTE — PROGRESS NOTES
Daily Note     Today's date: 3/3/2022  Patient name: Angy Cool  : 1950  MRN: [id number]  Referring provider: Brenda Prieto PT  Dx:   Encounter Diagnosis     ICD-10-CM    1  Lumbar radiculopathy  M54 16                   Subjective: Pt  Reports her back pain has moved to the right side  Objective: See treatment diary below      Assessment: Tolerated treatment well  Patient would benefit from continued PT      Plan: Continue per plan of care  Precautions: allergies: sulfa antibiotics, other many inhalants carries epi pen, nabumetone      Manuals 1/26/22 1/28 2/4/22 2/7 2/10 2/14 2/17 2/24 2/28 3/3    I eval                          Manual direct left psoas release  Man 15 min   Man  , ql, psoas, quad, ITB , glut and pirformis left          Soft tissue massage and mobilization techniques thoracolumbar spine and left hip    Man  Total 60 min           Neuro Re-Ed             Quad stretch in prone             Hamstring stretch in supine              Bent knee fall outs             dls                                                    Ther Ex/ aquatic exer              Water walking fwd/ bwd, laps 5/5  5 laps forward only shallows  5/5 5/5 5/5 5/5 5/5 5/5 5/5   Sitting long sit stretch/AKTC  5 reps 20 sec hold   5 hold 20 sec  20sec x 5 20 sec x 5 20"x5 20sec x 5 20sec x 5 20sec x 5   Standing quad stretch             Sitting laq, hip flexion , hip abd /add ashanti cross, hip int and ext rot, leg thrusts  3 x 10     Leg thrusts 1 min  3 x 10   30x 3 x 10  No int /ext rotation 3x10 30x 30x 30x   Sitting adductor stretch on bench     15sec x 5 30 se c x3 30"x3 30sec x 3 30x 30x   Standing heelraises, hip abd/add, knee flexion , hip flex /ext  3 x 10  3 x 10 30x 3 x 10 8   3x10 30x 30x 30x   1/2 jj,30 push/pull jogging in place with small dumbells, cross country  30 reps  no dumbells, did marching 30 reps    1min each reps with small dumbbells  3 min each 3 min ea 3 min 3 min 3 min   Flutter kicks over yellow float  5 min   5min   5min 5 min 5 min 5 min 5 min   Ther Activity             sidestepping in shallow section laps 5    5 laps    5 laps  5 laps   Step ups    3 x 10 30x 3 x 10 3x10 20x 20x 20x   Gait Training                                       Modalities             Heat to ls and left hp/groin prn and TENS smp mode  estim charged low back and leg hip 15 min

## 2022-03-07 ENCOUNTER — OFFICE VISIT (OUTPATIENT)
Dept: PHYSICAL THERAPY | Age: 72
End: 2022-03-07
Payer: MEDICARE

## 2022-03-07 DIAGNOSIS — M54.16 LUMBAR RADICULOPATHY: Primary | ICD-10-CM

## 2022-03-07 PROCEDURE — 97530 THERAPEUTIC ACTIVITIES: CPT | Performed by: PHYSICAL THERAPIST

## 2022-03-07 NOTE — PROGRESS NOTES
Daily Note     Today's date: 3/7/2022  Patient name: Adrienne Chau  : 1950  MRN: [id number]  Referring provider: Lida Long, PT  Dx:   Encounter Diagnosis     ICD-10-CM    1  Lumbar radiculopathy  M54 16                   Subjective: Pt had L knee pain following last session      Objective: See treatment diary below      Assessment: Tolerated treatment well  Patient demonstrated fatigue post treatment, would benefit from continued PT and pt deferred jumping jacks/sidestepping as she feels this may have caused knee pain after last session      Plan: Continue per plan of care  Progress treatment as tolerated  Precautions: allergies: sulfa antibiotics, other many inhalants carries epi pen, nabumetone      Manuals 1/26/22 1/28 2/4/22 2/7 2/10 2/14 2/17 2/24 2/28 3/3 3/7     I eval                              Manual direct left psoas release  Man 15 min   Man  , ql, psoas, quad, ITB , glut and pirformis left            Soft tissue massage and mobilization techniques thoracolumbar spine and left hip    Man  Total 60 min             Neuro Re-Ed               Quad stretch in prone               Hamstring stretch in supine                Bent knee fall outs               dls                                                            Ther Ex/ aquatic exer                Water walking fwd/ bwd, laps 5/5  5 laps forward only shallows  5/5 5/5 5/5 5/5 5/5 5/5 5/5 5/5    Sitting long sit stretch/AKTC  5 reps 20 sec hold   5 hold 20 sec  20sec x 5 20 sec x 5 20"x5 20sec x 5 20sec x 5 20sec x 5 20sec x 5    Standing quad stretch               Sitting laq, hip flexion , hip abd /add ashanti cross, hip int and ext rot, leg thrusts  3 x 10     Leg thrusts 1 min  3 x 10   30x 3 x 10  No int /ext rotation 3x10 30x 30x 30x 30x    Sitting adductor stretch on bench     15sec x 5 30 se c x3 30"x3 30sec x 3 30x 30x 30x    Standing heelraises, hip abd/add, knee flexion , hip flex /ext  3 x 10  3 x 10 30x 3 x 10 8   3x10 30x 30x 30x 30x    1/2 jj,30 push/pull jogging in place with small dumbells, cross country  30 reps  no dumbells, did marching 30 reps    1min each reps with small dumbbells  3 min each 3 min ea 3 min 3 min 3 min     Flutter kicks over yellow float  5 min   5min   5min 5 min 5 min 5 min 5 min 5 min    Ther Activity               sidestepping in shallow section laps 5    5 laps    5 laps  5 laps np    Step ups    3 x 10 30x 3 x 10 3x10 20x 20x 20x 20x    Gait Training                                             Modalities               Heat to ls and left hp/groin prn and TENS smp mode  estim charged low back and leg hip 15 min

## 2022-03-09 ENCOUNTER — OFFICE VISIT (OUTPATIENT)
Dept: PHYSICAL THERAPY | Age: 72
End: 2022-03-09
Payer: MEDICARE

## 2022-03-09 DIAGNOSIS — M54.16 LUMBAR RADICULOPATHY: Primary | ICD-10-CM

## 2022-03-09 PROCEDURE — 97113 AQUATIC THERAPY/EXERCISES: CPT

## 2022-03-09 NOTE — PROGRESS NOTES
Daily Note     Today's date: 3/9/2022  Patient name: Adriana Pierson  : 1950  MRN: [id number]  Referring provider: Abby Sher, PT  Dx:   Encounter Diagnosis     ICD-10-CM    1  Lumbar radiculopathy  M54 16                   Subjective: Pt  Reports her back pain is right sided  Pt  Reports she feels stronger with getting out of a chair, and her shoulder feels better  Objective: See treatment diary below      Assessment: Tolerated treatment well  Patient would benefit from continued PT      Plan: Continue per plan of care  Precautions: allergies: sulfa antibiotics, other many inhalants carries epi pen, nabumetone      Manuals 1/26/22 1/28 2/4/22 2/7 2/10 2/14 2/17 2/24 2/28 3/3 3/7 3/9    I eval                              Manual direct left psoas release  Man 15 min   Man  , ql, psoas, quad, ITB , glut and pirformis left            Soft tissue massage and mobilization techniques thoracolumbar spine and left hip    Man  Total 60 min             Neuro Re-Ed               Quad stretch in prone               Hamstring stretch in supine                Bent knee fall outs               dls               Back bends                                             Ther Ex/ aquatic exer                Water walking fwd/ bwd, laps 5/5  5 laps forward only shallows  5/5 5/5 5/5 5/5 5/5 5/5 5/5 5/5 5/5   Sitting long sit stretch/AKTC  5 reps 20 sec hold   5 hold 20 sec  20sec x 5 20 sec x 5 20"x5 20sec x 5 20sec x 5 20sec x 5 20sec x 5 20sec x 5   Standing quad stretch               Sitting laq, hip flexion , hip abd /add ashanti cross, hip int and ext rot, leg thrusts  3 x 10     Leg thrusts 1 min  3 x 10   30x 3 x 10  No int /ext rotation 3x10 30x 30x 30x 30x 30x   Sitting adductor stretch on bench     15sec x 5 30 se c x3 30"x3 30sec x 3 30x 30x 30x 30x   Standing heelraises, hip abd/add, knee flexion , hip flex /ext  3 x 10  3 x 10 30x 3 x 10 8   3x10 30x 30x 30x 30x 30x   1/2 jj,30 push/pull jogging in place with small dumbells, cross country  30 reps  no dumbells, did marching 30 reps    1min each reps with small dumbbells  3 min each 3 min ea 3 min 3 min 3 min  3 min   Flutter kicks over yellow float  5 min   5min   5min 5 min 5 min 5 min 5 min 5 min 5 min   Ther Activity               sidestepping in shallow section laps 5    5 laps    5 laps  5 laps np 5x   Step ups    3 x 10 30x 3 x 10 3x10 20x 20x 20x 20x 20x   Gait Training               Back bends            10x                  Modalities               Heat to ls and left hp/groin prn and TENS smp mode  estim charged low back and leg hip 15 min

## 2022-03-10 ENCOUNTER — APPOINTMENT (OUTPATIENT)
Dept: PHYSICAL THERAPY | Age: 72
End: 2022-03-10
Payer: MEDICARE

## 2022-03-14 ENCOUNTER — OFFICE VISIT (OUTPATIENT)
Dept: PHYSICAL THERAPY | Age: 72
End: 2022-03-14
Payer: MEDICARE

## 2022-03-14 DIAGNOSIS — M54.16 LUMBAR RADICULOPATHY: Primary | ICD-10-CM

## 2022-03-14 DIAGNOSIS — M25.552 LEFT HIP PAIN: ICD-10-CM

## 2022-03-14 PROCEDURE — 97113 AQUATIC THERAPY/EXERCISES: CPT

## 2022-03-14 NOTE — PROGRESS NOTES
PT Re-Evaluation     Today's date: 3/14/2022  Patient name: Noelle Castro  : 1950  MRN: [id number]  Referring provider: Juan Alberto Guerrero PT  Dx:   Encounter Diagnosis     ICD-10-CM    1  Lumbar radiculopathy  M54 16    2   Left hip pain  M25 552                   Assessment/Plan    Subjective    Objective           Precautions: see allergies    Will U and Carmelina G PT supervising units   Manuals 3/14/22            Reassessment of status                                                    Neuro Re-Ed/ there exer              Aquatic exer               Laps fwd/ bwd 5/5            Sitting aktc stretch 5 hold 20 sec             Hamstring stretch             Standing heellraises, knee flex, hip abd//add, flex ext with pelvic neutral control 3 x 10            1/2jj jogging in place with small dumbbells 5 min each            Flutter kicks yellow float 1 min            Sitting laq, hip flex, , leg thrusts  30, 30 and 5 min respectively            Step ups  3 x 10                                                                                                       Ther Activity                                       Gait Training                                       Modalities

## 2022-03-17 ENCOUNTER — APPOINTMENT (OUTPATIENT)
Dept: PHYSICAL THERAPY | Age: 72
End: 2022-03-17
Payer: MEDICARE

## 2022-03-28 ENCOUNTER — OFFICE VISIT (OUTPATIENT)
Dept: PHYSICAL THERAPY | Age: 72
End: 2022-03-28
Payer: MEDICARE

## 2022-03-28 DIAGNOSIS — M54.16 LUMBAR RADICULOPATHY: Primary | ICD-10-CM

## 2022-03-28 DIAGNOSIS — M25.552 LEFT HIP PAIN: ICD-10-CM

## 2022-03-28 PROCEDURE — 97110 THERAPEUTIC EXERCISES: CPT
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
## 2022-03-31 ENCOUNTER — APPOINTMENT (OUTPATIENT)
Dept: PHYSICAL THERAPY | Age: 72
End: 2022-03-31
Payer: MEDICARE

## 2022-06-20 ENCOUNTER — TELEPHONE (OUTPATIENT)
Dept: NEPHROLOGY | Facility: CLINIC | Age: 72
End: 2022-06-20

## 2022-06-20 NOTE — TELEPHONE ENCOUNTER
LM for pt to schedule November follow up with Dr Sarah Beth Hsu in the [facility]

## 2022-07-01 ENCOUNTER — OFFICE VISIT (OUTPATIENT)
Dept: PHYSICAL THERAPY | Age: 72
End: 2022-07-01
Payer: MEDICARE

## 2022-07-01 DIAGNOSIS — M54.16 LUMBAR RADICULOPATHY: Primary | ICD-10-CM

## 2022-07-01 PROCEDURE — 97014 ELECTRIC STIMULATION THERAPY: CPT

## 2022-07-01 PROCEDURE — 97162 PT EVAL MOD COMPLEX 30 MIN: CPT

## 2022-07-01 PROCEDURE — 97140 MANUAL THERAPY 1/> REGIONS: CPT

## 2022-07-02 NOTE — PROGRESS NOTES
PT Evaluation     Today's date: 2022  Patient name: Damian Acevedo  : 1950  MRN: [id number]  Referring provider: Marah Rios, PT  Dx:   Encounter Diagnosis     ICD-10-CM    1  Lumbar radiculopathy  M54 16                   Assessment/Plan    Subjective Evaluation    History of Present Illness  Onset date: 22  Recurrent probem    Quality of life: good    Pain  Current pain ratin  At best pain ratin  At worst pain rating: 10  Location:  right sided si, low back , gr trochanteric pain   Quality: sharp, radiating and pulling  Relieving factors: heat, change in position and rest  Aggravating factors: lifting, sitting and walking  Progression: improved    Social Support  Lives in: multiple-level home  Lives with: spouse    Employment status: working (self employed)  Exercise history: enjoys walking     Treatments  Previous treatment: physical therapy  Current treatment: physical therapy  Patient Goals  Patient goals for therapy: decreased pain, increased motion, increased strength, independence with ADLs/IADLs and return to sport/leisure activities  Patient goal: reduction in pain by 50 percent in 2 weeks         Objective           Precautions: allergies : sulfa antibiotics, nabumetone, other      Manuals 22             I eval             Muscle energy technique right hip ext , left hip adduction  5 reps 10 sec hold             si mobs by PT grade 3  man                         Neuro Re-Ed, there exer              Standing backbends 10 reps             Squats  2 sets of 10             Supine dls              Nu step/lifecycle                                                    Ther Ex                                                                                                                     Ther Activity                                       Gait Training                                       Modalities prn             Mh , estim If E stim charged

## 2022-07-08 ENCOUNTER — OFFICE VISIT (OUTPATIENT)
Dept: PHYSICAL THERAPY | Age: 72
End: 2022-07-08
Payer: MEDICARE

## 2022-07-08 DIAGNOSIS — M54.16 LUMBAR RADICULOPATHY: Primary | ICD-10-CM

## 2022-07-08 PROCEDURE — 97110 THERAPEUTIC EXERCISES: CPT

## 2022-07-08 PROCEDURE — 97140 MANUAL THERAPY 1/> REGIONS: CPT

## 2022-07-08 NOTE — PROGRESS NOTES
Daily Note     Today's date: 2022  Patient name: Damian Acevedo  : 1950  MRN: [id number]  Referring provider: Marah Rios, PT  Dx:   Encounter Diagnosis     ICD-10-CM    1  Lumbar radiculopathy  M54 16                   Subjective: "I can walk once I get going  "      Objective: See treatment diary below      Assessment: Tolerated treatment well  Patient would benefit from continued PT, prone pressups added       Plan: Continue per plan of care  Precautions: allergies : sulfa antibiotics, nabumetone, other      Manuals 22            I eval             Muscle energy technique right hip ext , left hip adduction  5 reps 10 sec hold  Left leg pull for pelvic upslip manual 15 min           si mobs by PT grade 3  man man           graston to right piriformis, ITB , gr trocanter  man           Neuro Re-Ed, there exer   Prone pressups 3 x 10            Standing backbends 10 reps  10           Squats  2 sets of 10  3 x 10           Supine dls              Nu step/lifecycle                                                    Ther Ex                                                                                                                     Ther Activity                                       Gait Training                                       Modalities prn              , estim If E stim charged

## 2022-07-11 ENCOUNTER — OFFICE VISIT (OUTPATIENT)
Dept: PHYSICAL THERAPY | Age: 72
End: 2022-07-11
Payer: MEDICARE

## 2022-07-11 DIAGNOSIS — M54.16 LUMBAR RADICULOPATHY: Primary | ICD-10-CM

## 2022-07-11 PROCEDURE — 97110 THERAPEUTIC EXERCISES: CPT

## 2022-07-11 PROCEDURE — 97140 MANUAL THERAPY 1/> REGIONS: CPT

## 2022-07-11 NOTE — PROGRESS NOTES
Daily Note     Today's date: 2022  Patient name: Odell Mendez  : 1950  MRN: [id number]  Referring provider: Chad Waddell, PT  Dx:   Encounter Diagnosis     ICD-10-CM    1  Lumbar radiculopathy  M54 16                   Subjective: Pt  Reports her back pain has improved and can now walk with upright posture  Objective: See treatment diary below      Assessment: Tolerated treatment well  Patient would benefit from continued PT      Plan: Continue per plan of care  Precautions: allergies : sulfa antibiotics, nabumetone, other      Manuals 22           I eval             Muscle energy technique right hip ext , left hip adduction  5 reps 10 sec hold  Left leg pull for pelvic upslip manual 15 min           si mobs by PT grade 3  man man           graston to right piriformis, ITB , gr trocanter  man 15 min          Neuro Re-Ed, there exer   Prone pressups 3 x 10            Standing backbends 10 reps  10 2 x 10          Squats  2 sets of 10  3 x 10 30x          Supine dls              Nu step/lifecycle   15 min          HSS   20sec x 5          Piriformis st   20sec x 5                       Ther Ex                                                                                                                     Ther Activity                                       Gait Training                                       Modalities prn             Mh , estim If E stim charged

## 2022-07-13 ENCOUNTER — OFFICE VISIT (OUTPATIENT)
Dept: PHYSICAL THERAPY | Age: 72
End: 2022-07-13
Payer: MEDICARE

## 2022-07-13 DIAGNOSIS — M54.16 LUMBAR RADICULOPATHY: Primary | ICD-10-CM

## 2022-07-13 PROCEDURE — 97110 THERAPEUTIC EXERCISES: CPT

## 2022-07-13 PROCEDURE — 97530 THERAPEUTIC ACTIVITIES: CPT

## 2022-07-13 PROCEDURE — 97140 MANUAL THERAPY 1/> REGIONS: CPT

## 2022-07-14 NOTE — PROGRESS NOTES
Daily Note     Today's date: 2022  Patient name: Luis Armando Britton  : 1950  MRN: [id number]  Referring provider: Beba Washburn, PT  Dx:   Encounter Diagnosis     ICD-10-CM    1  Lumbar radiculopathy  M54 16                   Subjective: "today my left  Hip hurts, my right side is getting better "      Objective: See treatment diary below      Assessment: Tolerated treatment well  Patient demonstrated fatigue post treatment and would benefit from continued PT      Plan: Continue per plan of care  Precautions: allergies : sulfa antibiotics, nabumetone, other      Manuals 22          I eval             Muscle energy technique right hip ext , left hip adduction  5 reps 10 sec hold  Left leg pull for pelvic upslip manual 15 min           si mobs by PT grade 3  man man           graston to right piriformis, ITB , gr trocanter  man 13 min 27 min man right and left          Neuro Re-Ed, there exer   Prone pressups 3 x 10            Standing backbends 10 reps  10 2 x 10 2 x 10         Squats  2 sets of 10  3 x 10 30x 3 x 10          Supine dls              Nu step/lifecycle   15 min 15 min          HSS   20sec x 5          Piriformis st   20sec x 5          Right sidelying with right hip on pillows    10 min position hold while graston performed on left          Ther Ex                                                                                                                     Ther Activity             Review of proper sit to stand and stand pivot turn, and proper posture    15 min                       Gait Training                                       Modalities prn             Mh , estim If E stim charged

## 2022-07-18 ENCOUNTER — OFFICE VISIT (OUTPATIENT)
Dept: PHYSICAL THERAPY | Age: 72
End: 2022-07-18
Payer: MEDICARE

## 2022-07-18 DIAGNOSIS — M54.16 LUMBAR RADICULOPATHY: Primary | ICD-10-CM

## 2022-07-18 PROCEDURE — 97110 THERAPEUTIC EXERCISES: CPT

## 2022-07-18 PROCEDURE — 97140 MANUAL THERAPY 1/> REGIONS: CPT

## 2022-07-19 NOTE — PROGRESS NOTES
Daily Note     Today's date: 2022  Patient name: Angy Cool  : 1950  MRN: [id number]  Referring provider: Brenda Prieto, PT  Dx:   Encounter Diagnosis     ICD-10-CM    1  Lumbar radiculopathy  M54 16                   Subjective: "My right SI is fine but by left si and hip hurt today "      Objective: See treatment diary below      Assessment: Tolerated treatment well  Patient would benefit from continued PT      Plan: Continue per plan of care  Precautions: allergies : sulfa antibiotics, nabumetone, other      Manuals 22         I eval             Muscle energy technique right hip ext , left hip adduction  5 reps 10 sec hold  Left leg pull for pelvic upslip manual 15 min   Left leg pull for pelvic upslip man 15min        si mobs by PT grade 3  man man           graston to right piriformis, ITB , gr trocanter  man 13 min 27 min man right and left  man        Neuro Re-Ed, there exer   Prone pressups 3 x 10            Standing backbends 10 reps  10 2 x 10 2 x 10 2 sets of 10        Squats  2 sets of 10  3 x 10 30x 3 x 10  3 x 10        Supine dls              Nu step/lifecycle   15 min 15 min          HSS   20sec x 5  20 sec x 5        Piriformis st   20sec x 5          Right sidelying with right hip on pillows    10 min position hold while graston performed on left          Ther Ex             Prone hip ext knee straight and knee flexed     3 x 10  each                                                                                                   Ther Activity             Review of proper sit to stand and stand pivot turn, and proper posture    15 min                       Gait Training                                       Modalities prn             Mh , estim If E stim charged

## 2022-07-20 ENCOUNTER — OFFICE VISIT (OUTPATIENT)
Dept: PHYSICAL THERAPY | Age: 72
End: 2022-07-20
Payer: MEDICARE

## 2022-07-20 ENCOUNTER — TELEPHONE (OUTPATIENT)
Dept: NEPHROLOGY | Facility: CLINIC | Age: 72
End: 2022-07-20

## 2022-07-20 DIAGNOSIS — M54.16 LUMBAR RADICULOPATHY: Primary | ICD-10-CM

## 2022-07-20 PROCEDURE — 97110 THERAPEUTIC EXERCISES: CPT

## 2022-07-20 PROCEDURE — 97140 MANUAL THERAPY 1/> REGIONS: CPT

## 2022-07-21 NOTE — PROGRESS NOTES
Daily Note     Today's date: 2022  Patient name: Rissa Acosta  : 1950  MRN: [id number]  Referring provider: Michell Fofana, PT  Dx:   Encounter Diagnosis     ICD-10-CM    1  Lumbar radiculopathy  M54 16                   Subjective: "My back is okay but I feel it in my left hip ( pt pointing to greater trochanteric region)      Objective: See treatment diary below      Assessment: Tolerated treatment well  Patient would benefit from continued PT      Plan: Continue per plan of care  Precautions: allergies : sulfa antibiotics, nabumetone, other      Manuals 22        I eval             Muscle energy technique right hip ext , left hip adduction  5 reps 10 sec hold  Left leg pull for pelvic upslip manual 15 min   Left leg pull for pelvic upslip man 15min -       si mobs by PT grade 3  man man           graston to right piriformis, ITB , gr trocanter  man 13 min 27 min man right and left  man man also graston to left greater troch and ITB band       Neuro Re-Ed, there exer   Prone pressups 3 x 10            Standing backbends 10 reps  10 2 x 10 2 x 10 2 sets of 10 10 reps        Squats  2 sets of 10  3 x 10 30x 3 x 10  3 x 10 6 sets of 10        Supine dls              Nu step/lifecycle   15 min 15 min   15 min r 5       HSS   20sec x 5  20 sec x 5 20 sec x 5       Piriformis st   20sec x 5          Right sidelying with right hip on pillows    10 min position hold while zohaibton performed on left          Ther Ex             Prone hip ext knee straight and knee flexed     3 x 10  each 3 x 10       Self ITB stretch in right sidelying       30 se c 1                                                                                     Ther Activity             Review of proper sit to stand and stand pivot turn, and proper posture    15 min                       Gait Training                                       Modalities prn             Mh , estim If E stim charged

## 2022-07-25 ENCOUNTER — OFFICE VISIT (OUTPATIENT)
Dept: PHYSICAL THERAPY | Age: 72
End: 2022-07-25
Payer: MEDICARE

## 2022-07-25 DIAGNOSIS — M54.16 LUMBAR RADICULOPATHY: Primary | ICD-10-CM

## 2022-07-25 PROCEDURE — 97110 THERAPEUTIC EXERCISES: CPT

## 2022-07-25 PROCEDURE — 97140 MANUAL THERAPY 1/> REGIONS: CPT

## 2022-07-25 NOTE — PROGRESS NOTES
Daily Note     Today's date: 2022  Patient name: Samra Aguirre  : 1950  MRN: [id number]  Referring provider: Leroy Sharma PT  Dx:   Encounter Diagnosis     ICD-10-CM    1  Lumbar radiculopathy  M54 16                   Subjective: "It's my left hip bursitis that is bothering my  My back is fine now "      Objective: See treatment diary below      Assessment: Tolerated treatment well  Patient would benefit from continued PT  PT to hold off on standing ITB stretch  Emphasis today manual left ITB stretching and graston       Plan: Continue per plan of care        Precautions: allergies : sulfa antibiotics, nabumetone, other      Manuals 22       I eval             Muscle energy technique right hip ext , left hip adduction  5 reps 10 sec hold  Left leg pull for pelvic upslip manual 15 min   Left leg pull for pelvic upslip man 15min - -man left ITB stretching by PT      si mobs by PT grade 3  man man           graston to right piriformis, ITB , gr trocanter  man 13 min 27 min man right and left  man man also graston to left greater troch and ITB band Man to left greater trochanter and ITB band      Neuro Re-Ed, there exer   Prone pressups 3 x 10            Standing backbends 10 reps  10 2 x 10 2 x 10 2 sets of 10 10 reps  10      Squats  2 sets of 10  3 x 10 30x 3 x 10  3 x 10 6 sets of 10  6 sets of 10       Supine dls              Nu step/lifecycle   15 min 15 min   15 min r 5 15 min r 5      HSS   20sec x 5  20 sec x 5 20 sec x 5 20 sec x 5      Piriformis st   20sec x 5          Right sidelying with right hip on pillows    10 min position hold while graston performed on left          Ther Ex             Prone hip ext knee straight and knee flexed     3 x 10  each 3 x 10       Self ITB stretch in right sidelying       30 se c 1                                                                                     Ther Activity             Review of proper sit to stand and stand pivot turn, and proper posture    15 min                       Gait Training                                       Modalities prn             Mh , estim If E stim charged

## 2022-07-27 ENCOUNTER — OFFICE VISIT (OUTPATIENT)
Dept: PHYSICAL THERAPY | Age: 72
End: 2022-07-27
Payer: MEDICARE

## 2022-07-27 DIAGNOSIS — M54.16 LUMBAR RADICULOPATHY: Primary | ICD-10-CM

## 2022-07-27 PROCEDURE — 97110 THERAPEUTIC EXERCISES: CPT

## 2022-07-27 NOTE — PROGRESS NOTES
Daily Note     Today's date: 2022  Patient name: Bhumika Banuelos  : 1950  MRN: [id number]  Referring provider: Inez Corrigan PT  Dx:   Encounter Diagnosis     ICD-10-CM    1  Lumbar radiculopathy  M54 16                   Subjective: "I don't have any hip pain anywhere today and the pillow contoured for between my hip when I sleep is helpful "      Objective: See treatment diary below      Assessment: Tolerated treatment well  Patient would benefit from continued PT      Plan: Continue per plan of care        Precautions: allergies : sulfa antibiotics, nabumetone, other      Manuals 22      I eval             Muscle energy technique right hip ext , left hip adduction  5 reps 10 sec hold  Left leg pull for pelvic upslip manual 15 min   Left leg pull for pelvic upslip man 15min - -man left ITB stretching by PT      si mobs by PT grade 3  man man           graston to right piriformis, ITB , gr trocanter  man 13 min 27 min man right and left  man man also graston to left greater troch and ITB band Man to left greater trochanter and ITB band      Neuro Re-Ed, there exer   Prone pressups 3 x 10       ITB stretch in sidelying 1 min x 1     Standing backbends 10 reps  10 2 x 10 2 x 10 2 sets of 10 10 reps  10 10     Squats  2 sets of 10  3 x 10 30x 3 x 10  3 x 10 6 sets of 10  6 sets of 10  8 sets of 10     Supine dls              Nu step/lifecycle   15 min 15 min   15 min r 5 15 min r 5 15 min r5     HSS   20sec x 5  20 sec x 5 20 sec x 5 20 sec x 5 20 sec x 5     Piriformis st   20sec x 5          Right sidelying with right hip on pillows    10 min position hold while graston performed on left          Ther Ex             Prone hip ext knee straight and knee flexed     3 x 10  each 3 x 10  3 x 10     Self ITB stretch in right sidelying       30 se c 1   1min x 1     Prone quad stretch         1 min x 1 Ther Activity             Review of proper sit to stand and stand pivot turn, and proper posture    15 min                       Gait Training                                       Modalities prn             Mh , estim If E stim charged

## 2022-08-03 ENCOUNTER — APPOINTMENT (OUTPATIENT)
Dept: PHYSICAL THERAPY | Age: 72
End: 2022-08-03
Payer: MEDICARE

## 2022-08-04 ENCOUNTER — OFFICE VISIT (OUTPATIENT)
Dept: PHYSICAL THERAPY | Age: 72
End: 2022-08-04
Payer: MEDICARE

## 2022-08-04 DIAGNOSIS — M54.16 LUMBAR RADICULOPATHY: Primary | ICD-10-CM

## 2022-08-04 PROCEDURE — 97110 THERAPEUTIC EXERCISES: CPT

## 2022-08-04 PROCEDURE — 97140 MANUAL THERAPY 1/> REGIONS: CPT

## 2022-08-05 NOTE — PROGRESS NOTES
Daily Note     Today's date: 2022  Patient name: Jerome Leija  : 1950  MRN: [id number]  Referring provider: Souleymane Marcus PT  Dx:   Encounter Diagnosis     ICD-10-CM    1  Lumbar radiculopathy  M54 16                   Subjective: "The only pain I have is my left ITBand  "      Objective: See treatment diary below      Assessment: Tolerated treatment well  Patient would benefit from continued PT Trial left ITB kinesiotaping by PT today   Pt to wear for 2 days as able   Plan: Continue per plan of care         Precautions: allergies : sulfa antibiotics, nabumetone, other      Manuals 22     I eval             Muscle energy technique right hip ext , left hip adduction  5 reps 10 sec hold  Left leg pull for pelvic upslip manual 15 min   Left leg pull for pelvic upslip man 15min - -man left ITB stretching by PT  man left itb stretch    si mobs by PT grade 3  man man           graston to right piriformis, ITB , gr trocanter  man 13 min 27 min man right and left  man man also graston to left greater troch and ITB band Man to left greater trochanter and ITB band  man graston to ITB and itb taping kinesio    Neuro Re-Ed, there exer   Prone pressups 3 x 10       ITB stretch in sidelying 1 min x 1     Standing backbends 10 reps  10 2 x 10 2 x 10 2 sets of 10 10 reps  10 10     Squats  2 sets of 10  3 x 10 30x 3 x 10  3 x 10 6 sets of 10  6 sets of 10  8 sets of 10     Supine dls              Nu step/lifecycle   15 min 15 min   15 min r 5 15 min r 5 15 min r5 15 min r 5    HSS   20sec x 5  20 sec x 5 20 sec x 5 20 sec x 5 20 sec x 5 20 sec x 5    Piriformis st   20sec x 5          Right sidelying with right hip on pillows    10 min position hold while graston performed on left          Ther Ex             Prone hip ext knee straight and knee flexed     3 x 10  each 3 x 10  3 x 10 3 x 10    Self ITB stretch in right sidelying       30 se c 1   1min x 1 Prone quad stretch         1 min x 1 1 min x 1                                                                     Ther Activity             Review of proper sit to stand and stand pivot turn, and proper posture    15 min                       Gait Training                                       Modalities prn             Mh , estim If E stim charged

## 2022-08-08 ENCOUNTER — OFFICE VISIT (OUTPATIENT)
Dept: PHYSICAL THERAPY | Age: 72
End: 2022-08-08
Payer: MEDICARE

## 2022-08-08 DIAGNOSIS — M54.16 LUMBAR RADICULOPATHY: Primary | ICD-10-CM

## 2022-08-08 DIAGNOSIS — M25.552 LEFT HIP PAIN: ICD-10-CM

## 2022-08-08 PROCEDURE — 97110 THERAPEUTIC EXERCISES: CPT

## 2022-08-08 PROCEDURE — 97140 MANUAL THERAPY 1/> REGIONS: CPT

## 2022-08-09 NOTE — PROGRESS NOTES
Daily Note     Today's date: 2022  Patient name: Juan M Salmeron  : 1950  MRN: [id number]  Referring provider: Camilla Payne PT  Dx:   Encounter Diagnosis     ICD-10-CM    1  Lumbar radiculopathy  M54 16    2  Left hip pain  M25 552                   Subjective: "I am feeling much better "      Objective: See treatment diary below      Assessment: Tolerated treatment well   Patient to continue with a home exer program       Plan: d/c of PT      Precautions: allergies : sulfa antibiotics, nabumetone, other      Manuals 22    I eval             Muscle energy technique right hip ext , left hip adduction  5 reps 10 sec hold  Left leg pull for pelvic upslip manual 15 min   Left leg pull for pelvic upslip man 15min - -man left ITB stretching by PT  man left itb stretch man ITB stretch   si mobs by PT grade 3  man man           graston to right piriformis, ITB , gr trocanter  man 13 min 27 min man right and left  man man also graston to left greater troch and ITB band Man to left greater trochanter and ITB band  man graston to ITB and itb taping kinesio man alia   Neuro Re-Ed, there exer   Prone pressups 3 x 10       ITB stretch in sidelying 1 min x 1     Standing backbends 10 reps  10 2 x 10 2 x 10 2 sets of 10 10 reps  10 10  3 x 10   Squats  2 sets of 10  3 x 10 30x 3 x 10  3 x 10 6 sets of 10  6 sets of 10  8 sets of 10  6 sets 10   Supine dls              Nu step/lifecycle   15 min 15 min   15 min r 5 15 min r 5 15 min r5 15 min r 5 15 min r 5   HSS   20sec x 5  20 sec x 5 20 sec x 5 20 sec x 5 20 sec x 5 20 sec x 5 20 sec x 5   Piriformis st   20sec x 5          Right sidelying with right hip on pillows    10 min position hold while alia performed on left          Ther Ex             Prone hip ext knee straight and knee flexed     3 x 10  each 3 x 10  3 x 10 3 x 10 3 x 10   Self ITB stretch in right sidelying       30 se c 1   1min x 1     Prone quad stretch         1 min x 1 1 min x 1 1 min x 1   sidelying hip abd          3 x 10   Prone hip ext knee flexed and knee extended          3 x 10                                          Ther Activity             Review of proper sit to stand and stand pivot turn, and proper posture    15 min                       Gait Training                                       Modalities prn             Mh , estim If E stim charged

## 2022-08-10 ENCOUNTER — APPOINTMENT (OUTPATIENT)
Dept: PHYSICAL THERAPY | Age: 72
End: 2022-08-10
Payer: MEDICARE

## 2022-08-15 ENCOUNTER — APPOINTMENT (OUTPATIENT)
Dept: PHYSICAL THERAPY | Age: 72
End: 2022-08-15
Payer: MEDICARE

## 2022-08-18 ENCOUNTER — OFFICE VISIT (OUTPATIENT)
Dept: PHYSICAL THERAPY | Age: 72
End: 2022-08-18
Payer: MEDICARE

## 2022-08-18 DIAGNOSIS — M54.16 LUMBAR RADICULOPATHY: Primary | ICD-10-CM

## 2022-08-18 DIAGNOSIS — M25.552 LEFT HIP PAIN: ICD-10-CM

## 2022-08-18 PROCEDURE — 97140 MANUAL THERAPY 1/> REGIONS: CPT

## 2022-08-18 PROCEDURE — 97110 THERAPEUTIC EXERCISES: CPT

## 2022-08-19 NOTE — PROGRESS NOTES
Daily Note     Today's date: 2022  Patient name: Damian Acevedo  : 1950  MRN: [id number]  Referring provider: Marah Rios, PT  Dx:   Encounter Diagnosis     ICD-10-CM    1  Lumbar radiculopathy  M54 16    2  Left hip pain  M25 552                   Subjective: "I am feeling much better but I did do quite a bit of cleaning the other day and there is a little bit of left hip soreness  Discussion for pt to clean in 1 hour increment am and then rest  Then 1 hr later in pm then rest       Objective: See treatment diary below      Assessment: Tolerated treatment well   Patient demonstrated fatigue post treatment      Plan: d/c of PT at this time continue with a home exer program       Precautions: allergies : sulfa antibiotics, nabumetone, other      Manuals 22                Muscle energy technique right hip ext , left hip adduction   Left leg pull for pelvic upslip manual 15 min   Left leg pull for pelvic upslip man 15min - -man left ITB stretching by PT  man left itb stretch man ITB stretch   si mobs by PT grade 3   man           graston to right piriformis, ITB ,  trocanter man man 13 min 27 min man right and left  man man also graston to left greater troch and ITB band Man to left greater trochanter and ITB band  man graston to ITB and itb taping kinesio man alia   Neuro Re-Ed, there exer   Prone pressups 3 x 10       ITB stretch in sidelying 1 min x 1     Standing backbends 10 reps  10 2 x 10 2 x 10 2 sets of 10 10 reps  10 10  3 x 10   Squats  7 sets of 10  3 x 10 30x 3 x 10  3 x 10 6 sets of 10  6 sets of 10  8 sets of 10  6 sets 10   Supine dls              Nu step/lifecycle 15 min r 5  15 min 15 min   15 min r 5 15 min r 5 15 min r5 15 min r 5 15 min r 5   HSS 20 sec x 5  20sec x 5  20 sec x 5 20 sec x 5 20 sec x 5 20 sec x 5 20 sec x 5 20 sec x 5   Piriformis st   20sec x 5          Right sidelying with right hip on pillows    10 min position hold while alia performed on left          Ther Ex             Prone hip ext knee straight and knee flexed 3 x 10    3 x 10  each 3 x 10  3 x 10 3 x 10 3 x 10   Self ITB stretch in right sidelying       30 se c 1   1min x 1     Prone quad stretch  1 min x 1       1 min x 1 1 min x 1 1 min x 1   sidelying hip abd          3 x 10   Prone hip ext knee flexed and knee extended 3  x10         3 x 10                                          Ther Activity             Review of proper sit to stand and stand pivot turn, and proper posture    15 min                       Gait Training                                       Modalities prn             Mh , estim If E stim charged

## 2022-11-01 LAB
CREAT ?TM UR-SCNC: 76.2 UMOL/L
EXT PROTEIN URINE: 5.3
PROT/CREAT UR: 0.07 MG/G{CREAT}

## 2022-11-08 ENCOUNTER — TELEPHONE (OUTPATIENT)
Dept: NEPHROLOGY | Facility: CLINIC | Age: 72
End: 2022-11-08

## 2022-11-08 NOTE — TELEPHONE ENCOUNTER
Called unable to reached patient no voicemail  To complete their bloodwork prior to their appointment on 11/17 with Dr Shantell Hsu at the [facility]

## 2022-11-11 ENCOUNTER — DOCUMENTATION (OUTPATIENT)
Dept: NEPHROLOGY | Facility: HOSPITAL | Age: 72
End: 2022-11-11

## 2022-11-11 LAB
CREAT 24H UR-MCNC: 76.2 MG/DL
EXT BLOOD, UA: NEGATIVE
EXT GLUCOSE BLD: 86
EXT GLUCOSE, UA: NEGATIVE
EXT KETONES: NEGATIVE
EXT NITRITE, UA: NEGATIVE
EXT PH, UA: 6
EXT PROTEIN, UA: NEGATIVE
EXT SPECIFIC GRAVITY, UA: 1.01
EXTERNAL ALBUMIN: 3.8
EXTERNAL ALK PHOS: 79
EXTERNAL ALT: 25
EXTERNAL AST: 12
EXTERNAL BICARBONATE: 30
EXTERNAL BUN: 14
EXTERNAL CALCIUM: 9.4
EXTERNAL CHLORIDE: 110
EXTERNAL CREATININE: 0.62
EXTERNAL EGFR: 94
EXTERNAL MAGNESIUM: 2.5
EXTERNAL PHOSPHORUS: 4.2
EXTERNAL POTASSIUM: 4
EXTERNAL PTH: 35.3
EXTERNAL RBC (UA): 0
EXTERNAL SODIUM: 143
EXTERNAL T.BILIRUBIN: 0.4
EXTERNAL TOTAL PROTEIN: 6.5
EXTERNAL URIC ACID: 5.1
EXTERNAL WBC (UA): 0
HCT VFR BLD AUTO: 42.5 % (ref 34.8–46.1)
HGB BLD-MCNC: 14.4 G/DL (ref 11.5–15.4)
PLATELET # BLD AUTO: 245 THOUSANDS/UL (ref 149–390)
PROT SNV-MCNC: 5.3 MG/DL
PROTEIN/CREAT RATIO (HISTORICAL): 0.07
WBC # BLD AUTO: 4.7 THOUSAND/UL
WBC # BLD EST: NEGATIVE 10*3/UL

## 2022-11-16 ENCOUNTER — TELEPHONE (OUTPATIENT)
Dept: NEPHROLOGY | Facility: CLINIC | Age: 72
End: 2022-11-16

## 2022-11-16 NOTE — TELEPHONE ENCOUNTER
Appointment Confirmation   Person confirmed appointment with  If not patient, name of the person Spouse    Date and time of appointment 11/17    Patient acknowledged and will be at appointment? no    Did you advise the patient that they will need a urine sample if they are a new patient?  N/A    Did you advise the patient to bring their current medications for verification? (including any OTC) Yes    Additional Information

## 2022-11-17 ENCOUNTER — OFFICE VISIT (OUTPATIENT)
Dept: NEPHROLOGY | Facility: CLINIC | Age: 72
End: 2022-11-17

## 2022-11-17 VITALS — WEIGHT: 161 LBS | OXYGEN SATURATION: 97 % | HEART RATE: 74 BPM | BODY MASS INDEX: 27.49 KG/M2 | HEIGHT: 64 IN

## 2022-11-17 DIAGNOSIS — I15.0 RENOVASCULAR HYPERTENSION: Primary | ICD-10-CM

## 2022-11-17 DIAGNOSIS — E26.1 SECONDARY HYPERALDOSTERONISM (HCC): ICD-10-CM

## 2022-11-17 DIAGNOSIS — I77.3 FIBROMUSCULAR DYSPLASIA (HCC): ICD-10-CM

## 2022-11-17 NOTE — PROGRESS NOTES
OFFICE FOLLOW UP - Nephrology   Sarita Rutledge 67 y o  female MRN: [id number]    Encounter: [id number]        [birth year] [address] Tomrichiesharona was seen today for follow-up and hypertension  40-year-old female with a history of a right renal artery fibromuscular dysplasia that was diagnosed by CT angiography in 2011 who presents for follow-up regarding her blood pressure    Diagnoses and all orders for this visit:    Fibromuscular dysplasia ([address] )  Secondary hyperaldosteronism ([address] )  Renovascular hypertension    Blood Pressures are Elevated today  Was 146/78 on the right arm and 142/74 in the left arm  She states her BP has been in the 373G systolic rangs  She did try to increase her losartan to 50 mg daily and stated she didn't tolerated the dose  Will increase losartan to 25 mg twice daily and recommend home bp monitoring  She has no edema  Her electrolytes and creatinine are stable-creatinine 0 6  She had a renal artery Doppler in September of 2019 which showed 60-99% right renal artery narrowing consistent with fibromuscular dysplasia and this should be repeated  We have re-ordered the renal artery doppler    She can follow up in 1 year with blood work  We call her with results of repeat doppler      HPI: Sarita Rutledge is a 67 y o  female who is here for Follow-up and Hypertension    40-year-old who is here with a history of right renal fibromuscular dysplasia diagnosed by CT angiography 6 years ago events for evaluation of fluctuating blood pressure her creatinine has remained around 0 6 she has had no proteinuria or hematuria she does have family history in her  side of on cystic kidney disease he required living donor from his son    She now feels ok denies any cp, sob, fevers, chills  No N, V, D, busy with work has bene having some higher bp    ROS:   All the systems were reviewed and were negative except as documented on the HPI  Allergies:  Other, Sulfa antibiotics, and Nabumetone    Medications:   Current Outpatient Medications:   •  clarithromycin (BIAXIN XL) 500 MG 24 hr tablet, Take 1,000 mg by mouth daily, Disp: , Rfl:   •  losartan (COZAAR) 25 mg tablet, Take 25 mg by mouth 2 (two) times a day, Disp: , Rfl:   •  LUMIGAN 0 01 % ophthalmic drops, Administer 0 001 drops to both eyes 2 (two) times a day, Disp: , Rfl: 11  •  Omega-3 Fatty Acids (FISH OIL) 1,000 mg, Take 1,000 mg by mouth daily, Disp: , Rfl:   •  venlafaxine (EFFEXOR-XR) 37 5 mg 24 hr capsule, Take 37 5 mg by mouth once, Disp: , Rfl:   •  Venlafaxine HCl (EFFEXOR PO), Take 75 tablets by mouth once, Disp: , Rfl:   •  Albuterol Sulfate 108 (90 Base) MCG/ACT AEPB, Inhale 2 puffs every 4 (four) hours (Patient not taking: Reported on 11/17/2022), Disp: , Rfl:   •  beclomethasone (QVAR) 40 MCG/ACT inhaler, Inhale 2 puffs (Patient not taking: Reported on 11/17/2022), Disp: , Rfl:   •  brinzolamide (AZOPT) 1 % ophthalmic suspension, Azopt 1 % eye drops,suspension (Patient not taking: Reported on 11/17/2022), Disp: , Rfl:   •  budesonide (ENTOCORT EC) 3 MG capsule, TAKE 3 CAPSULES DAILY   (Patient not taking: Reported on 11/17/2022), Disp: , Rfl:   •  chlorhexidine (PERIDEX) 0 12 % solution, chlorhexidine gluconate 0 12 % mouthwash (Patient not taking: No sig reported), Disp: , Rfl:   •  Cholecalciferol 5000 units capsule, Take 6,000 Units by mouth daily  (Patient not taking: Reported on 11/17/2022), Disp: , Rfl:   •  clobetasol (TEMOVATE) 0 05 % GEL, clobetasol 0 05 % topical gel (Patient not taking: Reported on 11/17/2022), Disp: , Rfl:   •  Dicyclomine HCl (BENTYL PO), Take by mouth (Patient not taking: Reported on 11/17/2022), Disp: , Rfl:   •  estradiol (ESTRACE) 0 1 mg/g vaginal cream, Insert 8 59 application into the vagina once as needed (Patient not taking: Reported on 11/17/2022), Disp: , Rfl:   •  fexofenadine (ALLEGRA) 180 MG tablet, Allegra (Patient not taking: Reported on 11/17/2022), Disp: , Rfl:   • methylPREDNISolone 4 MG tablet therapy pack, Use as directed on package (Patient not taking: Reported on 9/11/2019), Disp: 21 tablet, Rfl: 0    Past Medical History:   Diagnosis Date   • Asymptomatic spider vein    • Collagenous colitis    • Disc disorder    • Female bladder prolapse    • Fibromuscular dysplasia (HCC)    • FMD (facioscapulohumeral muscular dystrophy) ([facility] )    • Hypertension    • Incomplete emptying of bladder    • Osteoarthritis    • Secondary hyperaldosteronism ([facility] )      Past Surgical History:   Procedure Laterality Date   • HYSTERECTOMY     • TUBAL LIGATION       Family History   Problem Relation Age of Onset   • Gout Mother    • Heart disease Mother    • Hypertension Mother    • Diabetes Father    • Heart disease Father    • Hypertension Father    • Gout Sister    • Heart disease Sister    • Hypertension Sister    • Gout Brother    • Heart disease Brother    • Hypertension Brother       reports that she has never smoked  She has never used smokeless tobacco  She reports current alcohol use  Physical Exam:   Vitals:    11/17/22 1135   Pulse: 74   SpO2: 97%   Weight: 73 kg (161 lb)   Height: 5' 4" (1 626 m)     Body mass index is 27 64 kg/m²      General: conscious, cooperative, in not acute distress  Eyes: conjunctivae pink, anicteric sclerae  ENT: lips and mucous membranes moist  Neck: supple, no JVD  Chest: clear breath sounds bilateral, no crackles, ronchus or wheezings  CVS: distinct S1 & S2, normal rate, regular rhythm  Abdomen: soft, non-tender, non-distended, normoactive bowel sounds  Extremities: no edema of both legs  Skin: no rash  Neuro: awake, alert, oriented      Lab Results:    Results for orders placed or performed in visit on 11/11/22   CreatU+Prot U   Result Value Ref Range    Protein, Total Urine 5 3 mg/dL    Creatinine, Ur 76 2     Protein/Creat Ratio 0 07    Comprehensive metabolic panel   Result Value Ref Range    SODIUM 143     POTASSIUM 4 0     CHLORIDE 110     BICARB 30     BUN 14     CREATININE 0 62     Glucose 86     EXTERNAL CALCIUM 9 4     TOTAL PROTEIN 6 5     ALBUMIN 3 8     AST 12     ALT 25     ALK PHOS 79     EXTERNAL TOT  BILIRUBIN 0 4     EXTERNAL EGFR 94    Magnesium   Result Value Ref Range    EXTERNAL MAGNESIUM 2 5    Phosphorus   Result Value Ref Range    EXTERNAL PHOSPHORUS 4 2    Uric acid   Result Value Ref Range    EXTERNAL URIC ACID 5 1    Urinalysis with microscopic   Result Value Ref Range    Spec Grav, UA (Ref:1 003-1 030) 1 013     Glucose, UA (Ref: Negative) Negative     Ketones, UA (Ref: Negative) Negative     Blood, UA Negative     Nitrite, UA (Ref: Negative) Negative      Leukocytes, UA (Ref: Negative) Negative     pH, UA (Ref: 4 5-8 0) 6 0     Protein, UA (Ref: Negative) Negative     RBC, UA 0     EXTERNAL WBC, UA 0    PTH, intact   Result Value Ref Range    PTH 35 3    CBC   Result Value Ref Range    Hemoglobin 14 4 11 5 - 15 4 g/dL    Hematocrit 42 5 34 8 - 46 1 %    Platelets 827 792 - 368 Thousands/uL    WBC 4 70 Thousand/uL     Creatinine 0 6    Portions of the record may have been created with voice recognition software  Occasional wrong word or "sound a like" substitutions may have occurred due to the inherent limitations of voice recognition software  Read the chart carefully and recognize, using context, where substitutions have occurred  If you have any questions, please contact the dictating provider

## 2022-11-17 NOTE — PATIENT INSTRUCTIONS
Low-Sodium Diet   WHAT YOU NEED TO KNOW:   What is a low-sodium diet? A low-sodium diet limits foods that are high in sodium (salt)  You will need to follow a low-sodium diet if you have high blood pressure, kidney disease, or heart failure  You may also need to follow this diet if you have a condition that is causing your body to retain (hold) extra fluid  You may need to limit the amount of sodium you eat in a day to 1,500 to 2,000 mg  Ask your healthcare provider how much sodium you can have each day  How can I use food labels to choose foods that are low in sodium? Read food labels to find the amount of sodium they contain  The amount of sodium is listed in milligrams (mg)  The % Daily Value (DV) column tells you how much of your daily needs are met by 1 serving of the food for each nutrient listed  Choose foods that have less than 5% of the DV of sodium  These foods are considered low in sodium  Foods that have 20% or more of the DV of sodium are considered high in sodium  Some food labels may also list any of the following terms that tell you about the sodium content in the food:  Sodium-free:  Less than 5 mg in each serving    Very low sodium:  35 mg of sodium or less in each serving    Low sodium:  140 mg of sodium or less in each serving    Reduced sodium: At least 25% less sodium in each serving than the regular type    Light in sodium:  50% less sodium in each serving    Unsalted or no added salt:  No extra salt is added during processing (the food may still contain sodium)       Which foods should I avoid? Salty foods are high in sodium   You should avoid the following:  Processed foods:      Mixes for cornbread, biscuits, cake, and pudding     Instant foods, such as potatoes, cereals, noodles, and rice     Packaged foods, such as bread stuffing, rice and pasta mixes, snack dip mixes, and macaroni and cheese     Canned foods, such as canned vegetables, soups, broths, sauces, and vegetable or tomato juice    Snack foods, such as salted chips, popcorn, pretzels, pork rinds, salted crackers, and salted nuts    Frozen foods, such as dinners, entrees, vegetables with sauces, and breaded meats    Sauerkraut, pickled vegetables, and other foods prepared in brine    Meats and cheeses:      Smoked or cured meat, such as corned beef, desai, ham, hot dogs, and sausage    Canned meats or spreads, such as potted meats, sardines, anchovies, and imitation seafood    Deli or lunch meats, such as bologna, ham, turkey, and roast beef    Processed cheese, such as American cheese and cheese spreads    Condiments, sauces, and seasonings:      Salt (¼ teaspoon of salt contains 575 mg of sodium)    Seasonings made with salt, such as garlic salt, celery salt, onion salt, and seasoned salt    Regular soy sauce, barbecue sauce, teriyaki sauce, steak sauce, Worcestershire sauce, and most flavored vinegars    Canned gravy and mixes     Regular condiments, such as mustard, ketchup, and salad dressings    Pickles and olives    Meat tenderizers and monosodium glutamate (MSG)    Which foods can I include? Read the food label to find the amount of sodium in each serving  Bread and cereal:  Try to choose breads with less than 80 mg of sodium per serving  Bread, roll, sukhwinder, tortilla, or unsalted crackers  Ready-to-eat cereals with less than 5% DV of sodium (examples include shredded wheat and puffed rice)    Pasta    Vegetables and fruits:      Unsalted fresh, frozen, or canned vegetables    Fresh, frozen, or canned fruits    Fruit juice    Dairy:  One serving has about 150 mg of sodium  Milk, all types    Yogurt    Hard cheese, such as cheddar, Swiss, [employer], or [employer] and other protein foods:  Some raw meats may have added sodium       Plain meats, fish, and poultry     Egg    Other foods:      Homemade pudding    Unsalted nuts, popcorn, or pretzels    Unsalted butter or margarine    What are some ways that I can decrease sodium? Add spices and herbs to foods instead of salt during cooking  Use salt-free seasonings to add flavor to foods  Examples include onion powder, garlic powder, basil, wilson powder, paprika, and parsley  Try lemon or lime juice or vinegar to give foods a tart flavor  Use hot peppers, pepper, or cayenne pepper to add a spicy flavor  Do not keep a salt shaker at your kitchen table  This may help keep you from adding salt to food at the table  A teaspoon of salt has 2,300 mg of sodium  It may take time to get used to enjoying the natural flavor of food instead of adding salt  Talk to your healthcare provider before you use salt substitutes  Some salt substitutes have a high amount of potassium and need to be avoided if you have kidney disease  Choose low-sodium foods at restaurants  Meals from restaurants are often high in sodium  Some restaurants have nutrition information on the menu that tells you the amount of sodium in their foods  If possible, ask for your food to be prepared with less, or no salt  Shop for unsalted or low-sodium foods and snacks at the grocery store  Examples include unsalted or low-sodium broths, soups, and canned vegetables  Choose fresh or frozen vegetables instead  Choose unsalted nuts or seeds or fresh fruits or vegetables as snacks  Read food labels and choose salt-free, very low-sodium, or low-sodium foods  CARE AGREEMENT:   You have the right to help plan your care  Discuss treatment options with your healthcare provider to decide what care you want to receive  You always have the right to refuse treatment  The above information is an  only  It is not intended as medical advice for individual conditions or treatments  Talk to your doctor, nurse or pharmacist before following any medical regimen to see if it is safe and effective for you    © Copyright [employer] 2022 Information is for End User's use only and may not be sold, redistributed or otherwise used for commercial purposes   All illustrations and images included in CareNotes® are the copyrighted property of A D A M , Inc  or [facility] [place] Leigh Ann Soria

## 2022-11-28 ENCOUNTER — TELEPHONE (OUTPATIENT)
Dept: NEPHROLOGY | Facility: CLINIC | Age: 72
End: 2022-11-28

## 2022-11-28 DIAGNOSIS — I77.3 FIBROMUSCULAR DYSPLASIA (HCC): Primary | ICD-10-CM

## 2022-11-28 NOTE — TELEPHONE ENCOUNTER
Patient called and said  Dr Jose Smith send me to do a ultrasound for kidney but need a order to do it  Because she doesn't have a order to do it

## 2022-11-29 ENCOUNTER — TELEPHONE (OUTPATIENT)
Dept: NEPHROLOGY | Facility: CLINIC | Age: 72
End: 2022-11-29

## 2022-11-29 NOTE — TELEPHONE ENCOUNTER
Called patient and left a voicemail about her question of ultrasound  Her appointment is on 12/7 at 2:30 pm for VAS Renal Artery Complete

## 2022-12-12 ENCOUNTER — OFFICE VISIT (OUTPATIENT)
Dept: URGENT CARE | Age: 72
End: 2022-12-12

## 2022-12-12 VITALS
BODY MASS INDEX: 27.64 KG/M2 | WEIGHT: 161 LBS | DIASTOLIC BLOOD PRESSURE: 70 MMHG | OXYGEN SATURATION: 97 % | RESPIRATION RATE: 18 BRPM | SYSTOLIC BLOOD PRESSURE: 140 MMHG | HEART RATE: 83 BPM | TEMPERATURE: 96.8 F

## 2022-12-12 DIAGNOSIS — J01.00 ACUTE NON-RECURRENT MAXILLARY SINUSITIS: Primary | ICD-10-CM

## 2022-12-12 RX ORDER — AMOXICILLIN AND CLAVULANATE POTASSIUM 875; 125 MG/1; MG/1
1 TABLET, FILM COATED ORAL EVERY 12 HOURS SCHEDULED
Qty: 20 TABLET | Refills: 0 | Status: SHIPPED | OUTPATIENT
Start: 2022-12-12 | End: 2022-12-22

## 2022-12-12 NOTE — PROGRESS NOTES
[facility]        NAME: Juan M Salmeron is a 67 y o  female  : 1950    MRN: [id number]  DATE: 2022  TIME: 2:18 PM    Assessment and Plan   Acute non-recurrent maxillary sinusitis [J01 00]  1  Acute non-recurrent maxillary sinusitis  amoxicillin-clavulanate (AUGMENTIN) 875-125 mg per tablet        Augmentin 2 times daily for the next 10 days for acute sinusitis  Patient is agreeable to this plan of care, all questions and concerns were addressed during this visit  Patient Instructions     Take antibiotics as discussed  Follow up with PCP in 3-5 days  Proceed to  ER if symptoms worsen  Chief Complaint     Chief Complaint   Patient presents with   • Earache   • Headache   • Nausea     Left ear pain, sinus pressure, headache, runny nose, nasal congestion since Monday         History of Present Illness       Patient presenting for evaluation of possible sinus infection  She states that over a week ago she began with flulike symptoms including headache, nausea, runny nose and congestion  She states that at that time she was exposed to her  who tested positive for flu  Patient states that she was treated with Tamiflu by her primary care provider  She states that she completed Tamiflu, but since has been having increasing sinus pressure left ear pain  She states that her cough is productive of yellow mucus  She denies any chest pain, shortness of breath, fevers or chills at this time  Earache   Associated symptoms include coughing, headaches and rhinorrhea  Pertinent negatives include no abdominal pain, sore throat or vomiting  Headache  Nausea  Associated symptoms include congestion, coughing, headaches and nausea  Pertinent negatives include no abdominal pain, chest pain, chills, fever, sore throat or vomiting  Review of Systems   Review of Systems   Constitutional: Negative for chills and fever     HENT: Positive for congestion, ear pain, rhinorrhea and sinus pressure  Negative for sore throat  Respiratory: Positive for cough  Negative for shortness of breath  Cardiovascular: Negative for chest pain  Gastrointestinal: Positive for nausea  Negative for abdominal pain and vomiting  Neurological: Positive for headaches  All other systems reviewed and are negative  Current Medications       Current Outpatient Medications:   •  amoxicillin-clavulanate (AUGMENTIN) 875-125 mg per tablet, Take 1 tablet by mouth every 12 (twelve) hours for 10 days, Disp: 20 tablet, Rfl: 0  •  Albuterol Sulfate 108 (90 Base) MCG/ACT AEPB, Inhale 2 puffs every 4 (four) hours (Patient not taking: Reported on 11/17/2022), Disp: , Rfl:   •  beclomethasone (QVAR) 40 MCG/ACT inhaler, Inhale 2 puffs (Patient not taking: Reported on 11/17/2022), Disp: , Rfl:   •  brinzolamide (AZOPT) 1 % ophthalmic suspension, Azopt 1 % eye drops,suspension (Patient not taking: Reported on 11/17/2022), Disp: , Rfl:   •  budesonide (ENTOCORT EC) 3 MG capsule, TAKE 3 CAPSULES DAILY   (Patient not taking: Reported on 11/17/2022), Disp: , Rfl:   •  chlorhexidine (PERIDEX) 0 12 % solution, chlorhexidine gluconate 0 12 % mouthwash (Patient not taking: No sig reported), Disp: , Rfl:   •  Cholecalciferol 5000 units capsule, Take 6,000 Units by mouth daily  (Patient not taking: Reported on 11/17/2022), Disp: , Rfl:   •  clarithromycin (BIAXIN XL) 500 MG 24 hr tablet, Take 1,000 mg by mouth daily, Disp: , Rfl:   •  clobetasol (TEMOVATE) 0 05 % GEL, clobetasol 0 05 % topical gel (Patient not taking: Reported on 11/17/2022), Disp: , Rfl:   •  Dicyclomine HCl (BENTYL PO), Take by mouth (Patient not taking: Reported on 11/17/2022), Disp: , Rfl:   •  estradiol (ESTRACE) 0 1 mg/g vaginal cream, Insert 1 65 application into the vagina once as needed (Patient not taking: Reported on 11/17/2022), Disp: , Rfl:   •  fexofenadine (ALLEGRA) 180 MG tablet, Allegra (Patient not taking: Reported on 11/17/2022), Disp: , Rfl: •  losartan (COZAAR) 25 mg tablet, Take 25 mg by mouth 2 (two) times a day, Disp: , Rfl:   •  LUMIGAN 0 01 % ophthalmic drops, Administer 0 001 drops to both eyes 2 (two) times a day, Disp: , Rfl: 11  •  methylPREDNISolone 4 MG tablet therapy pack, Use as directed on package (Patient not taking: Reported on 9/11/2019), Disp: 21 tablet, Rfl: 0  •  Omega-3 Fatty Acids (FISH OIL) 1,000 mg, Take 1,000 mg by mouth daily, Disp: , Rfl:   •  venlafaxine (EFFEXOR-XR) 37 5 mg 24 hr capsule, Take 37 5 mg by mouth once, Disp: , Rfl:   •  Venlafaxine HCl (EFFEXOR PO), Take 75 tablets by mouth once, Disp: , Rfl:     Current Allergies     Allergies as of 12/12/2022 - Reviewed 12/12/2022   Allergen Reaction Noted   • Other  01/26/2016   • Sulfa antibiotics Other (See Comments) 03/26/2010   • Nabumetone Rash and Angioedema 04/16/2015            The following portions of the patient's history were reviewed and updated as appropriate: allergies, current medications, past family history, past medical history, past social history, past surgical history and problem list      Past Medical History:   Diagnosis Date   • Asymptomatic spider vein    • Collagenous colitis    • Disc disorder    • Female bladder prolapse    • Fibromuscular dysplasia ([address] )    • FMD (facioscapulohumeral muscular dystrophy) ([facility] [address] )    • Hypertension    • Incomplete emptying of bladder    • Osteoarthritis    • Secondary hyperaldosteronism ([facility] [address] )        Past Surgical History:   Procedure Laterality Date   • HYSTERECTOMY     • TUBAL LIGATION         Family History   Problem Relation Age of Onset   • Gout Mother    • Heart disease Mother    • Hypertension Mother    • Diabetes Father    • Heart disease Father    • Hypertension Father    • Gout Sister    • Heart disease Sister    • Hypertension Sister    • Gout Brother    • Heart disease Brother    • Hypertension Brother          Medications have been verified          Objective   /70   Pulse 83   Temp (!) 96 8 °F (36 °C) (Temporal)   Resp 18   Wt 73 kg (161 lb)   SpO2 97%   BMI 27 64 kg/m²        Physical Exam     Physical Exam  Vitals and nursing note reviewed  Constitutional:       General: She is not in acute distress  Appearance: Normal appearance  She is normal weight  She is not ill-appearing, toxic-appearing or diaphoretic  HENT:      Head: Normocephalic and atraumatic  Right Ear: Tympanic membrane normal       Left Ear: Tympanic membrane normal       Nose: Congestion present  No rhinorrhea  Right Sinus: Maxillary sinus tenderness present  No frontal sinus tenderness  Left Sinus: Maxillary sinus tenderness present  No frontal sinus tenderness  Mouth/Throat:      Mouth: Mucous membranes are moist       Pharynx: Oropharynx is clear  No oropharyngeal exudate or posterior oropharyngeal erythema  Eyes:      General:         Right eye: No discharge  Left eye: No discharge  Cardiovascular:      Rate and Rhythm: Normal rate and regular rhythm  Pulses: Normal pulses  Heart sounds: Normal heart sounds  No murmur heard  No friction rub  No gallop  Pulmonary:      Effort: Pulmonary effort is normal  No respiratory distress  Breath sounds: Normal breath sounds  No stridor  No wheezing, rhonchi or rales  Chest:      Chest wall: No tenderness  Abdominal:      General: Abdomen is flat  Bowel sounds are normal       Palpations: Abdomen is soft  Tenderness: There is no abdominal tenderness  There is no guarding or rebound  Skin:     General: Skin is warm and dry  Neurological:      Mental Status: She is alert     Psychiatric:         Mood and Affect: Mood normal          Behavior: Behavior normal

## 2022-12-12 NOTE — PATIENT INSTRUCTIONS
Please take Augmentin 2 times daily for the next 10 days  1   Drink plenty fluids  2   Take probiotics [i e  Yogurt, Acidophilus, Florastor (liquid)] daily  3   Over-the-counter medications as needed for symptomatic care  4    Advance activities as tolerated  5    Follow-up with your primary care physician in 3-4 days  6   Go to emergency room if symptoms are worsening      7   Use a humidifier at bedtime

## 2023-01-06 ENCOUNTER — HOSPITAL ENCOUNTER (OUTPATIENT)
Dept: NON INVASIVE DIAGNOSTICS | Facility: CLINIC | Age: 73
Discharge: HOME | End: 2023-01-06

## 2023-01-06 DIAGNOSIS — I77.3 FIBROMUSCULAR DYSPLASIA (HCC): ICD-10-CM

## 2023-01-09 ENCOUNTER — TELEPHONE (OUTPATIENT)
Dept: NEPHROLOGY | Facility: CLINIC | Age: 73
End: 2023-01-09

## 2023-01-09 ENCOUNTER — TELEPHONE (OUTPATIENT)
Dept: NEPHROLOGY | Facility: HOSPITAL | Age: 73
End: 2023-01-09

## 2023-01-09 DIAGNOSIS — I10 HYPERTENSION: Primary | ICD-10-CM

## 2023-01-09 RX ORDER — LOSARTAN POTASSIUM 25 MG/1
25 TABLET ORAL 2 TIMES DAILY
Qty: 180 TABLET | Refills: 3 | Status: SHIPPED | OUTPATIENT
Start: 2023-01-09

## 2023-01-09 NOTE — TELEPHONE ENCOUNTER
Patient called and stated she would like a refill for her Losartan 25mg twice a day, I stated we did not prescribe this medication  Patient states that it was talked about at her last visit  She did call her PCP who prescribed it but they did not get back her yet and she is leaving for a spontaneous trip tomorrow and wont have enough  I advised she call her PCP back and try and get an answer  I stated I would also send the message to Dr Joya Redd as well

## 2023-01-09 NOTE — TELEPHONE ENCOUNTER
Called and left voicemail  Patient Renal artery doppler was reviewed and her renal artery stenosis is unchanged from 2019 which is good  No changes to current medications  Refilled of Losartan have been sent to pharmacy

## 2023-01-09 NOTE — TELEPHONE ENCOUNTER
I treat her Blood pressure and am ok filling  Please let her know  I refilled and sent to her pharmacy  Let me know if any issues  Also please let her know that her renal artery doppler was reviewed and her renal artery stenosis is unchanged from 2019 which is good  No changes to current medications  Thanks

## 2023-01-09 NOTE — TELEPHONE ENCOUNTER
Called patient and went over the following:    Dr Ismael Marinelli refilled and sent to her pharmacy  Let us know if any issues  Dr Ismael Marinelli also went over her renal artery doppler and her renal artery stenosis is unchanged from 2019 which is good  No changes to current medications      Patient verbally understood and had no further questions for me at this time

## 2023-01-11 NOTE — TELEPHONE ENCOUNTER
Called and spoke to patients spouse  Patient aware Patient Renal artery doppler was reviewed and her renal artery stenosis is unchanged from 2019 which is good  No changes to current medications  Refilled of Losartan have been sent to pharmacy

## 2023-05-25 ENCOUNTER — TELEPHONE (OUTPATIENT)
Dept: NEPHROLOGY | Facility: CLINIC | Age: 73
End: 2023-05-25

## 2023-05-25 ENCOUNTER — OFFICE VISIT (OUTPATIENT)
Dept: DERMATOLOGY | Facility: CLINIC | Age: 73
End: 2023-05-25

## 2023-05-25 VITALS — WEIGHT: 158.2 LBS | BODY MASS INDEX: 27.01 KG/M2 | HEIGHT: 64 IN

## 2023-05-25 DIAGNOSIS — L82.1 SEBORRHEIC KERATOSIS: Primary | ICD-10-CM

## 2023-05-25 DIAGNOSIS — D22.60 MULTIPLE BENIGN MELANOCYTIC NEVI OF UPPER EXTREMITY, LOWER EXTREMITY, AND TRUNK: ICD-10-CM

## 2023-05-25 DIAGNOSIS — D22.5 MULTIPLE BENIGN MELANOCYTIC NEVI OF UPPER EXTREMITY, LOWER EXTREMITY, AND TRUNK: ICD-10-CM

## 2023-05-25 DIAGNOSIS — D22.70 MULTIPLE BENIGN MELANOCYTIC NEVI OF UPPER EXTREMITY, LOWER EXTREMITY, AND TRUNK: ICD-10-CM

## 2023-05-25 DIAGNOSIS — M25.59 PAIN IN OTHER JOINT: ICD-10-CM

## 2023-05-25 DIAGNOSIS — L81.4 SOLAR LENTIGO: ICD-10-CM

## 2023-05-25 DIAGNOSIS — D18.01 CHERRY ANGIOMA: ICD-10-CM

## 2023-05-25 NOTE — PATIENT INSTRUCTIONS
1  SEBORRHEIC KERATOSES  - Relevant exam: Scattered over the trunk/extremities are waxy brown to black plaques and papules with stuck on appearance  - Exam and clinical history consistent with seborrheic keratoses  - Counseled that these are benign growths that do not require treatment  - Counseled that removal of lesions is considered cosmetic and so would incur a fee should patient elect to move forward  - Patient to hold on treatments for now but will inform us should they desire additional treatments    2  MELANOCYTIC NEVI  -Relevant exam: Scattered over the trunk/extremities are homogenously pigmented brown macules and papules  ELM performed and without concerning findings  - Exam and clinical history consistent with melanocytic nevi  - Educated on the ABCDE's of melanoma; handout provided  - Counseled to return to clinic prior to scheduled appointment should any of these lesions change or should any new lesions of concern arise  - Counseled on use of sun protection daily  Reviewed latest FDA sunscreen guidelines, including use of broad spectrum (UVA and UVB blocking) sunscreen or sun protective clothing with SPF 30-50 every 2-3 hours and reapplied after exposure to water; use of photoprotective clothing, including a broad brim hat and UPF rated clothing if outdoors for several hours; avoid use of tanning beds as these pose significant risk for melanoma and skin cancer  3 LENTIGINES  OTHER SKIN CHANGES DUE TO CHRONIC EXPOSURE TO NONIONIZING RADIATION  - Relevant exam: Over sun exposed areas are brown macules  ELM performed and without concerning findings  - Exam and clinical history consistent with lentigines  - Educated that these are indicative of prior sun exposure  - Counseled to return to clinic prior to scheduled appointment should any of these lesions change or should any new lesions of concern arise   - Recommended use of sunscreen as above and below    - Counseled on use of sun protection daily  Reviewed latest FDA sunscreen guidelines, including use of broad spectrum (UVA and UVB blocking) sunscreen or sun protective clothing with SPF 30-50 every 2-3 hours and reapplied after exposure to water; use of photoprotective clothing, including a broad brim hat and UPF rated clothing if outdoors for several hours; avoid use of tanning beds as these pose significant risk for melanoma and skin cancer  4 CHERRY ANGIOMAS  - Relevant exam: Scattered over the trunk/extremities are red papules  - Exam and clinical history consistent with cherry angiomas  - Educated that these are benign  - Educated that removal is considered aesthetic and would incur a fee  - Patient does not wish to pursue removal at this time but will contact us should this change  5  JOINT PAIN     Assessment and Plan:  Based on a thorough discussion of this condition and the management approach to it (including a comprehensive discussion of the known risks, side effects and potential benefits of treatment), the patient (family) agrees to implement the following specific plan: Will refer patient to rheumatology to have other test done  Lumps feel to be more deeper than the surface of the skin and she notes pain sporadically in the joint area

## 2023-05-25 NOTE — PROGRESS NOTES
"[facility] Note     Patient Name: Estrella Clemente  Encounter Date: 5/25/2023     Have you been cared for by a [facility] in the last 3 years and, if so, which description applies to you? NO  I am considered a \"new\" patient and must complete all patient intake questions  I am FEMALE/of child-bearing potential     REVIEW OF SYSTEMS:  Have you recently had or currently have any of the following? · Recent fever or chills? No  · Any non-healing wound? No  · Are you pregnant or planning to become pregnant? No  · Are you currently or planning to be nursing or breast feeding? No   PAST MEDICAL HISTORY:  Have you personally ever had or currently have any of the following? If \"YES,\" then please provide more detail  · Skin cancer (such as Melanoma, Basal Cell Carcinoma, Squamous Cell Carcinoma? No  · Tuberculosis, HIV/AIDS, Hepatitis B or C: No  · Systemic Immunosuppression such as Diabetes, Biologic or Immunotherapy, Chemotherapy, Organ Transplantation, Bone Marrow Transplantation No  · Radiation Treatment No   FAMILY HISTORY:  Any \"first degree relatives\" (parent, brother, sister, or child) with the following? • Skin Cancer, Pancreatic or Other Cancer? YES, skin cancer for son history of BCC    PATIENT EXPERIENCE:    • Do you want the Dermatologist to perform a COMPLETE skin exam today including a clinical examination under the \"bra and underwear\" areas? Yes  • If necessary, do we have your permission to call and leave a detailed message on your Preferred Phone number that includes your specific medical information? Yes      Allergies   Allergen Reactions   • Misc   Sulfonamide Containing Compounds Anaphylaxis   • Sulfa Antibiotics Anaphylaxis and Other (See Comments)     rash   • Other      Annotation - [id number]: many inhalants, carries epi pen   • Nabumetone Rash and Angioedema      Current Outpatient Medications:   •  Cholecalciferol 5000 units capsule, Take 6,000 Units " by mouth daily, Disp: , Rfl:   •  clarithromycin (BIAXIN XL) 500 MG 24 hr tablet, Take 1,000 mg by mouth daily, Disp: , Rfl:   •  losartan (COZAAR) 25 mg tablet, Take 1 tablet (25 mg total) by mouth 2 (two) times a day, Disp: 180 tablet, Rfl: 3  •  LUMIGAN 0 01 % ophthalmic drops, Administer 0 001 drops to both eyes 2 (two) times a day, Disp: , Rfl: 11  •  Omega-3 Fatty Acids (FISH OIL) 1,000 mg, Take 1,000 mg by mouth daily, Disp: , Rfl:   •  venlafaxine (EFFEXOR-XR) 37 5 mg 24 hr capsule, Take 37 5 mg by mouth once, Disp: , Rfl:   •  Venlafaxine HCl (EFFEXOR PO), Take 75 tablets by mouth once, Disp: , Rfl:   •  Albuterol Sulfate 108 (90 Base) MCG/ACT AEPB, Inhale 2 puffs every 4 (four) hours (Patient not taking: Reported on 11/17/2022), Disp: , Rfl:   •  beclomethasone (QVAR) 40 MCG/ACT inhaler, Inhale 2 puffs (Patient not taking: Reported on 11/17/2022), Disp: , Rfl:   •  brinzolamide (AZOPT) 1 % ophthalmic suspension, Azopt 1 % eye drops,suspension (Patient not taking: Reported on 11/17/2022), Disp: , Rfl:   •  budesonide (ENTOCORT EC) 3 MG capsule, TAKE 3 CAPSULES DAILY   (Patient not taking: Reported on 11/17/2022), Disp: , Rfl:   •  chlorhexidine (PERIDEX) 0 12 % solution, chlorhexidine gluconate 0 12 % mouthwash (Patient not taking: No sig reported), Disp: , Rfl:   •  clobetasol (TEMOVATE) 0 05 % GEL, clobetasol 0 05 % topical gel (Patient not taking: Reported on 11/17/2022), Disp: , Rfl:   •  Dicyclomine HCl (BENTYL PO), Take by mouth (Patient not taking: Reported on 11/17/2022), Disp: , Rfl:   •  estradiol (ESTRACE) 0 1 mg/g vaginal cream, Insert 3 91 application into the vagina once as needed (Patient not taking: Reported on 11/17/2022), Disp: , Rfl:   •  fexofenadine (ALLEGRA) 180 MG tablet, Allegra (Patient not taking: Reported on 11/17/2022), Disp: , Rfl:   •  methylPREDNISolone 4 MG tablet therapy pack, Use as directed on package (Patient not taking: Reported on 9/11/2019), Disp: 21 tablet, Rfl: 0 • Whom besides the patient is providing clinical information about today's encounter?   o NO ADDITIONAL HISTORIAN (patient alone provided history)    Physical Exam and Assessment/Plan by Diagnosis:    Lumps on - new bumps for few months and back posterior neck and left hip  1  SEBORRHEIC KERATOSES  - Relevant exam: Scattered over the trunk/extremities are waxy brown to black plaques and papules with stuck on appearance  - Exam and clinical history consistent with seborrheic keratoses  - Counseled that these are benign growths that do not require treatment  - Counseled that removal of lesions is considered cosmetic and so would incur a fee should patient elect to move forward  - Patient to hold on treatments for now but will inform us should they desire additional treatments    2  MELANOCYTIC NEVI  -Relevant exam: Scattered over the trunk/extremities are homogenously pigmented brown macules and papules  ELM performed and without concerning findings  - Exam and clinical history consistent with melanocytic nevi  - Educated on the ABCDE's of melanoma; handout provided  - Counseled to return to clinic prior to scheduled appointment should any of these lesions change or should any new lesions of concern arise  - Counseled on use of sun protection daily  Reviewed latest FDA sunscreen guidelines, including use of broad spectrum (UVA and UVB blocking) sunscreen or sun protective clothing with SPF 30-50 every 2-3 hours and reapplied after exposure to water; use of photoprotective clothing, including a broad brim hat and UPF rated clothing if outdoors for several hours; avoid use of tanning beds as these pose significant risk for melanoma and skin cancer  3 LENTIGINES  OTHER SKIN CHANGES DUE TO CHRONIC EXPOSURE TO NONIONIZING RADIATION  - Relevant exam: Over sun exposed areas are brown macules  ELM performed and without concerning findings  - Exam and clinical history consistent with lentigines    - Educated that these are indicative of prior sun exposure  - Counseled to return to clinic prior to scheduled appointment should any of these lesions change or should any new lesions of concern arise   - Recommended use of sunscreen as above and below  - Counseled on use of sun protection daily  Reviewed latest FDA sunscreen guidelines, including use of broad spectrum (UVA and UVB blocking) sunscreen or sun protective clothing with SPF 30-50 every 2-3 hours and reapplied after exposure to water; use of photoprotective clothing, including a broad brim hat and UPF rated clothing if outdoors for several hours; avoid use of tanning beds as these pose significant risk for melanoma and skin cancer  4 CHERRY ANGIOMAS  - Relevant exam: Scattered over the trunk/extremities are red papules  - Exam and clinical history consistent with cherry angiomas  - Educated that these are benign  - Educated that removal is considered aesthetic and would incur a fee  - Patient does not wish to pursue removal at this time but will contact us should this change  5  JOINT PAIN   Physical Exam:  • Anatomic Location Affected:  Knees, hip  • Pertinent Positives:  • Pertinent Negatives: Additional History of Present Condition:  Patient notes new lumps deep to the skin in the joint that are painful at times and it becomes hard to sleep at night  Patient had an ultrasound done of the lower extremity but came back normal  These lumps hurt at night with pressure and feel numb when active bilaterally on hip and knee area  Patient notes 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, daughter has lupus  She has not followed with rheumatology since she was much younger      Assessment and Plan:  Based on a thorough discussion of this condition and the management approach to it (including a comprehensive discussion of the known risks, side effects and potential benefits of treatment), the patient (family) agrees to implement the following specific plan:  • Will refer patient to rheumatology to have other test done  Lumps feel to be more deeper than the surface of the skin and she notes pain sporadically in the joint area                Scribe Attestation    I,:  Johnny Hyatt MA am acting as a scribe while in the presence of the attending physician :       I,:  Katie Mendez MD personally performed the services described in this documentation    as scribed in my presence :

## 2023-05-28 ENCOUNTER — OFFICE VISIT (OUTPATIENT)
Dept: URGENT CARE | Facility: MEDICAL CENTER | Age: 73
End: 2023-05-28

## 2023-05-28 VITALS
HEART RATE: 85 BPM | OXYGEN SATURATION: 97 % | TEMPERATURE: 98.1 F | WEIGHT: 158 LBS | HEIGHT: 64 IN | BODY MASS INDEX: 26.98 KG/M2 | RESPIRATION RATE: 20 BRPM

## 2023-05-28 DIAGNOSIS — J32.9 RECURRENT SINUS INFECTIONS: Primary | ICD-10-CM

## 2023-05-28 RX ORDER — AMOXICILLIN AND CLAVULANATE POTASSIUM 875; 125 MG/1; MG/1
1 TABLET, FILM COATED ORAL EVERY 12 HOURS SCHEDULED
Qty: 20 TABLET | Refills: 0 | Status: SHIPPED | OUTPATIENT
Start: 2023-05-28 | End: 2023-06-07

## 2023-05-28 RX ORDER — METHYLPREDNISOLONE 4 MG/1
TABLET ORAL
Qty: 1 EACH | Refills: 0 | Status: SHIPPED | OUTPATIENT
Start: 2023-05-28

## 2023-05-28 NOTE — PROGRESS NOTES
"  [facility]        NAME: Ramón Díaz is a 67 y o  female  : 1950    MRN: [id number]  DATE: May 28, 2023  TIME: 2:29 PM    Assessment and Plan   Recurrent sinus infections [J32 9]  1  Recurrent sinus infections  amoxicillin-clavulanate (AUGMENTIN) 875-125 mg per tablet    methylPREDNISolone 4 MG tablet therapy pack            Patient Instructions       Follow up with PCP in 3-5 days  Proceed to  ER if symptoms worsen  Chief Complaint     Chief Complaint   Patient presents with   • Allergies     Pt  C/o sinus congestion, cough, has chronic allergies, pt  Using inhaler and daily nasal spray and antihistamine with no relief          History of Present Illness       Patient states she has a \"protocol\" from her allergiest which she is to do when she gets like this and she has done it without relief  She has now started with chills and has the sinus congestion, sinus drainage and using her inhaler more than prescribed  She has also been using the nasal sprays and antihistamines  She states in the past she has required the steroids and sometimes an antibiotic  Review of Systems   Review of Systems   Constitutional: Positive for chills, fatigue and fever  HENT: Positive for congestion, ear pain, postnasal drip, rhinorrhea, sinus pressure, sinus pain, sore throat and trouble swallowing  Eyes: Positive for redness  Negative for pain and visual disturbance  Respiratory: Positive for cough and shortness of breath  Productive for yellow mucus with productive cough at times  Cardiovascular: Negative for chest pain and palpitations  Gastrointestinal: Negative for abdominal pain, constipation, diarrhea, nausea and vomiting  Genitourinary: Negative for dysuria and hematuria  Musculoskeletal: Negative for arthralgias and back pain  Skin: Negative for color change and rash  Neurological: Positive for dizziness and headaches   Negative for seizures, syncope and " light-headedness  All other systems reviewed and are negative  Current Medications       Current Outpatient Medications:   •  Albuterol Sulfate 108 (90 Base) MCG/ACT AEPB, Inhale 2 puffs every 4 (four) hours, Disp: , Rfl:   •  amoxicillin-clavulanate (AUGMENTIN) 875-125 mg per tablet, Take 1 tablet by mouth every 12 (twelve) hours for 10 days, Disp: 20 tablet, Rfl: 0  •  beclomethasone (QVAR) 40 MCG/ACT inhaler, Inhale 2 puffs, Disp: , Rfl:   •  Cholecalciferol 5000 units capsule, Take 6,000 Units by mouth daily, Disp: , Rfl:   •  fexofenadine (ALLEGRA) 180 MG tablet, , Disp: , Rfl:   •  losartan (COZAAR) 25 mg tablet, Take 1 tablet (25 mg total) by mouth 2 (two) times a day, Disp: 180 tablet, Rfl: 3  •  LUMIGAN 0 01 % ophthalmic drops, Administer 0 001 drops to both eyes 2 (two) times a day, Disp: , Rfl: 11  •  methylPREDNISolone 4 MG tablet therapy pack, Use as directed on package, Disp: 1 each, Rfl: 0  •  Omega-3 Fatty Acids (FISH OIL) 1,000 mg, Take 1,000 mg by mouth daily, Disp: , Rfl:   •  venlafaxine (EFFEXOR-XR) 37 5 mg 24 hr capsule, Take 37 5 mg by mouth once, Disp: , Rfl:   •  Venlafaxine HCl (EFFEXOR PO), Take 75 tablets by mouth once, Disp: , Rfl:   •  brinzolamide (AZOPT) 1 % ophthalmic suspension, Azopt 1 % eye drops,suspension (Patient not taking: Reported on 11/17/2022), Disp: , Rfl:   •  budesonide (ENTOCORT EC) 3 MG capsule, TAKE 3 CAPSULES DAILY   (Patient not taking: Reported on 11/17/2022), Disp: , Rfl:   •  chlorhexidine (PERIDEX) 0 12 % solution, chlorhexidine gluconate 0 12 % mouthwash (Patient not taking: No sig reported), Disp: , Rfl:   •  clarithromycin (BIAXIN XL) 500 MG 24 hr tablet, Take 1,000 mg by mouth daily (Patient not taking: Reported on 5/28/2023), Disp: , Rfl:   •  clobetasol (TEMOVATE) 0 05 % GEL, clobetasol 0 05 % topical gel (Patient not taking: Reported on 11/17/2022), Disp: , Rfl:   •  Dicyclomine HCl (BENTYL PO), Take by mouth (Patient not taking: Reported on "11/17/2022), Disp: , Rfl:   •  estradiol (ESTRACE) 0 1 mg/g vaginal cream, Insert 3 83 application into the vagina once as needed (Patient not taking: Reported on 11/17/2022), Disp: , Rfl:     Current Allergies     Allergies as of 05/28/2023 - Reviewed 05/28/2023   Allergen Reaction Noted   • Misc  sulfonamide containing compounds Anaphylaxis 12/02/2008   • Sulfa antibiotics Anaphylaxis and Other (See Comments) 03/26/2010   • Other  01/26/2016   • Nabumetone Rash and Angioedema 04/16/2015            The following portions of the patient's history were reviewed and updated as appropriate: allergies, current medications, past family history, past medical history, past social history, past surgical history and problem list      Past Medical History:   Diagnosis Date   • Asymptomatic spider vein    • Collagenous colitis    • Disc disorder    • Female bladder prolapse    • Fibromuscular dysplasia ([address] )    • FMD (facioscapulohumeral muscular dystrophy) ([facility] [address] )    • Hypertension    • Incomplete emptying of bladder    • Osteoarthritis    • Secondary hyperaldosteronism ([facility] [address] )        Past Surgical History:   Procedure Laterality Date   • HYSTERECTOMY     • TUBAL LIGATION         Family History   Problem Relation Age of Onset   • Gout Mother    • Heart disease Mother    • Hypertension Mother    • Diabetes Father    • Heart disease Father    • Hypertension Father    • Gout Sister    • Heart disease Sister    • Hypertension Sister    • Gout Brother    • Heart disease Brother    • Hypertension Brother          Medications have been verified  Objective   Pulse 85   Temp 98 1 °F (36 7 °C)   Resp 20   Ht 5' 4\" (1 626 m)   Wt 71 7 kg (158 lb)   SpO2 97%   BMI 27 12 kg/m²        Physical Exam     Physical Exam  Vitals and nursing note reviewed  Constitutional:       General: She is not in acute distress  Appearance: Normal appearance  She is normal weight  She is not ill-appearing     HENT:      Head: Normocephalic and " atraumatic  Jaw: There is normal jaw occlusion  Right Ear: Ear canal and external ear normal  Tympanic membrane is bulging  Tympanic membrane is not erythematous  Left Ear: Ear canal and external ear normal  Tympanic membrane is bulging  Tympanic membrane is not erythematous  Nose: Congestion and rhinorrhea present  Rhinorrhea is clear and purulent  Mouth/Throat:      Lips: Pink  Mouth: Mucous membranes are moist       Pharynx: Oropharynx is clear  Uvula midline  Posterior oropharyngeal erythema present  No pharyngeal swelling, oropharyngeal exudate or uvula swelling  Tonsils: No tonsillar exudate or tonsillar abscesses  0 on the right  0 on the left  Eyes:      Extraocular Movements: Extraocular movements intact  Conjunctiva/sclera: Conjunctivae normal       Pupils: Pupils are equal, round, and reactive to light  Neck:      Trachea: Trachea normal    Cardiovascular:      Rate and Rhythm: Normal rate and regular rhythm  Pulses: Normal pulses  Heart sounds: Normal heart sounds  Pulmonary:      Effort: Pulmonary effort is normal       Breath sounds: Normal breath sounds  Abdominal:      General: Abdomen is flat  Bowel sounds are normal       Palpations: Abdomen is soft  Musculoskeletal:         General: Normal range of motion  Cervical back: Full passive range of motion without pain, normal range of motion and neck supple  Lymphadenopathy:      Cervical: No cervical adenopathy  Skin:     General: Skin is warm  Capillary Refill: Capillary refill takes less than 2 seconds  Neurological:      General: No focal deficit present  Mental Status: She is alert and oriented to person, place, and time     Psychiatric:         Mood and Affect: Mood normal          Behavior: Behavior normal

## 2023-06-12 ENCOUNTER — EVALUATION (OUTPATIENT)
Dept: PHYSICAL THERAPY | Age: 73
End: 2023-06-12
Payer: MEDICARE

## 2023-06-12 VITALS — SYSTOLIC BLOOD PRESSURE: 110 MMHG | DIASTOLIC BLOOD PRESSURE: 70 MMHG | HEART RATE: 65 BPM

## 2023-06-12 DIAGNOSIS — R26.9 ABNORMALITY OF GAIT: Primary | ICD-10-CM

## 2023-06-12 DIAGNOSIS — M25.552 LEFT HIP PAIN: ICD-10-CM

## 2023-06-12 PROCEDURE — 97110 THERAPEUTIC EXERCISES: CPT

## 2023-06-12 PROCEDURE — 97162 PT EVAL MOD COMPLEX 30 MIN: CPT

## 2023-06-12 NOTE — LETTER
2023    Dorie Real MD  [address] [place] [address]    Patient: Randolm Romberg   YOB: 1950   Date of Visit: 2023     Encounter Diagnosis     ICD-10-CM    1  Abnormality of gait  R26 9       2  Left hip pain  M25 552           Dear Dr Gilma Portillo: Thank you for your recent referral of Randolm Romberg  Please review the attached evaluation summary from [facility] recent visit  Please verify that you agree with the plan of care by signing the attached order  If you have any questions or concerns, please do not hesitate to call  I sincerely appreciate the opportunity to share in the care of one of your patients and hope to have another opportunity to work with you in the near future  Sincerely,    Mary Kay Longoria, PT      Referring Provider:      I certify that I have read the below Plan of Care and certify the need for these services furnished under this plan of treatment while under my care  Dorie Real MD  [address] [place] [address]  Via Fax: [phone]          PT Evaluation     Today's date: 2023  Patient name: Randolm Romberg  : 1950  MRN: [id number]  Referring provider: Flor Cuellar MD  Dx:   Encounter Diagnosis     ICD-10-CM    1  Abnormality of gait  R26 9       2  Left hip pain  M25 552                      Assessment  Assessment details: Aniya Pa is a 67year old female referred to outpt PT s/p 2 falls in the past 6 months c/o imbalance , gait abnormality, right si joint and left hip/groin pain s/p her most recent fall at the end of May of 2023  PT is warranted to address the above stated deficits in efforts to improve balance, reduce falling episodes, improve gait quality and increase left le strength  Pt shall also address left pelvic upslip and reinforce previously learned home exer program to address pelvic obliquity   Upon pain reduction increase emphasis on balance training  Impairments: abnormal gait, abnormal or restricted ROM, activity intolerance, impaired balance, impaired physical strength, lacks appropriate home exercise program, pain with function, poor posture  and poor body mechanics  Other impairment: hx of 2 falls in 6 months,   Functional limitations: right si pain, left hip/groin le pain and weakness, imbalance, unstable gait, left le weakness  Symptom irritability: moderateUnderstanding of Dx/Px/POC: good   Prognosis: good    Goals  stg 1  [place] with home exer program in 2 weeks   2  Increase left le by 1 muscle grade in 2 weeks  3 reduce pain by 25 percent in 2 weeks     ltg  1  Achieve left le mmt 5/5 status in 4 weeks  2  Reduction of pain by 80 percneet in 4 weeks   3  Improve balance to 28/30 FGA in 4 weeks       Plan  Patient would benefit from: skilled physical therapy and PT eval  Referral necessary: Yes  Other planned modality interventions: modalities as needed  Planned therapy interventions: neuromuscular re-education, patient education, postural training, balance, manual therapy, abdominal trunk stabilization, strengthening, stretching, therapeutic activities, therapeutic exercise, gait training, home exercise program and body mechanics training  Other planned therapy interventions: graston, prn, muscle energy technique , joint mobilization ,   Frequency: 2x week  Duration in weeks: 8  Plan of Care beginning date: 6/12/2023  Plan of Care expiration date: 8/11/2023  Treatment plan discussed with: patient        Subjective Evaluation    History of Present Illness  Onset date: end of May 2023 s/p fall whle vacuuming  Mechanism of injury: Chet Prescott is a 67year old female referred to outpt PT s/p recent fall backwards while vacuuming  at the end of May of 2023  She reported landing on her coccyx bone now complaining of right SI, and left le inner groin and medial thigh pain and weakness   She reports unsteady gait and imbalance for the past 6 months reporting she frequently bangs her elbows into the walls and doorway while ambulating  [place] 6 months prior to this incident she report falling while going backwards down her steps unravelling gar[place] from the railing and sustaining a laceration to left side of her trunk and falling on her left side  Recurrent probem    Quality of life: good    Pain  Current pain ratin  At best pain rating: 3  At worst pain ratin  Location: right SI ,  left groin and hip pain  Quality: dull ache, tight, radiating, pulling and sharp  Relieving factors: change in position, heat, ice and rest  Aggravating factors: walking, standing, stair climbing and lifting  Progression: no change    Social Support  Steps to enter house: yes  Stairs in house: yes   Lives in: multiple-level home  Lives with: spouse    Employment status: working (owns building business, much computer work and much sitting)  Hand dominance: right  Exercise history: has access to a swimming pool   will try and utilize in the future,   likes to walk, has prior exer program that has not been performing to address pelvic obliquity    Treatments  Previous treatment: physical therapy  Current treatment: physical therapy  Patient Goals  Patient goals for therapy: decreased pain, improved balance, increased strength, independence with ADLs/IADLs and return to sport/leisure activities  Patient goal: reduction in pain by 80 percent and improve my balance with quick turns with walking and on uneven surfaces        Objective     Active Range of Motion     Lumbar   Flexion: Active lumbar flexion: 3/4 range  Extension: Active lumbar extension: 1/2 range pain right si , left groin  with pain  Left lateral flexion: Active left lumbar lateral flexion: 3/4 range        Right lateral flexion: Active right lumbar lateral flexion: 3/4 range  Left rotation: Active left lumbar rotation: 1/2 rotation     Right rotation: Active right lumbar rotation: 1/2 rotation    Left Hip   Flexion: WFL  Extension: 15 degrees   Abduction: WFL  Adduction: WFL  External rotation (90/90): 35 degrees   Internal rotation (90/90): 35 degrees     Right Hip   Flexion: WFL  Extension: WFL  Abduction: WFL  Adduction: WFL  External rotation (90/90): 45 degrees   Internal rotation (90/90): 40 degrees   Left Knee   Flexion: 105 degrees with pain  Extension: WFL    Right Knee   Flexion: 110 degrees with pain  Extension: WFL  Left Ankle/Foot   Dorsiflexion (ke): WFL  Plantar flexion: WFL    Right Ankle/Foot   Dorsiflexion (ke): WFL  Plantar flexion: [facility]    Additional Active Range of Motion Details  Mod quad tightness  Left ,   Right min ,   Left ITB tightness min  Tenderness left groin     Strength/Myotome Testing     Left Hip   Planes of Motion   Flexion: 4+  Extension: 4-  Abduction: 4  Adduction: 3+    Right Hip   Planes of Motion   Flexion: 5  Extension: 4+  Abduction: 5  Adduction: 4-    Left Knee   Flexion: 5  Extension: 5    Right Knee   Flexion: 5  Extension: 5    Left Ankle/Foot   Dorsiflexion: 5  Plantar flexion: 5    Right Ankle/Foot   Dorsiflexion: 5  Plantar flexion: 5    Additional Strength Details  Abdominal strength 3/5 , trunk extension 4/5 mmt    Ambulation     Ambulation: Level Surfaces   Ambulation without assistive device: independent    Additional Level Surfaces Ambulation Details  150 ft  Occasional weaving during gait uneven step length,   Tandem gait close supervision 10 ft,  Tandem stance falls in 20 sec , sharpened romberg falls in 5 seconds   Hand to opposite knee gait close supervision 10 ft,  TUG  10 seconds     Ambulation: Stairs   Ascend stairs: independent  Pattern: reciprocal  Railings: one rail  Descend stairs: independent  Pattern: reciprocal  Railings: one rail    PMH: secondary hyperaldosteronism, asthma, hx of sinusitis, fibromuscular dysplagia, renovascular hypertension, osteoporosis, collagenous colitis, disc disorder, hysterectomy, s/p tubal ligation, FMD, HTN , incomplete emptying of bladder, OA, chronic back pain         Precautions: Allergies  Reviewed by You at 11:16 PM   Severity Reactions Comments   Misc   Sulfonamide Containing Compounds High Anaphylaxis    Sulfa Antibiotics High Anaphylaxis, Other (See Comments) rash   Other Not Specified  Annotation - [id number]: many inhalants, carries epi pen   Nabumetone Low Rash, Angioedema            Manuals 6/12/23             I eval             Left leg pull for left pelvic upslip man                                      Neuro Re-Ed             Tandem gait 1 min x 1            Tandem stance 30 sec x 3            Squats  3 x 10            sLr pre's                                                     Ther Ex                                                                                                                     Ther Activity                                       Gait Training                                       Modalities

## 2023-06-13 NOTE — PROGRESS NOTES
PT Evaluation     Today's date: 2023  Patient name: Roney Vieira  : 1950  MRN: [id number]  Referring provider: Sheila Vail MD  Dx:   Encounter Diagnosis     ICD-10-CM    1  Abnormality of gait  R26 9       2  Left hip pain  M25 552                      Assessment  Assessment details: Bri Cox is a 67year old female referred to outpt PT s/p 2 falls in the past 6 months c/o imbalance , gait abnormality, right si joint and left hip/groin pain s/p her most recent fall at the end of May of 2023  PT is warranted to address the above stated deficits in efforts to improve balance, reduce falling episodes, improve gait quality and increase left le strength  Pt shall also address left pelvic upslip and reinforce previously learned home exer program to address pelvic obliquity  Upon pain reduction increase emphasis on balance training  Impairments: abnormal gait, abnormal or restricted ROM, activity intolerance, impaired balance, impaired physical strength, lacks appropriate home exercise program, pain with function, poor posture  and poor body mechanics  Other impairment: hx of 2 falls in 6 months,   Functional limitations: right si pain, left hip/groin le pain and weakness, imbalance, unstable gait, left le weakness  Symptom irritability: moderateUnderstanding of Dx/Px/POC: good   Prognosis: good    Goals  stg 1  [place] with home exer program in 2 weeks   2  Increase left le by 1 muscle grade in 2 weeks  3 reduce pain by 25 percent in 2 weeks     ltg  1  Achieve left le mmt 5/5 status in 4 weeks  2  Reduction of pain by 80 percneet in 4 weeks   3   Improve balance to 28/30 FGA in 4 weeks       Plan  Patient would benefit from: skilled physical therapy and PT eval  Referral necessary: Yes  Other planned modality interventions: modalities as needed  Planned therapy interventions: neuromuscular re-education, patient education, postural training, balance, manual therapy, abdominal trunk stabilization, strengthening, stretching, therapeutic activities, therapeutic exercise, gait training, home exercise program and body mechanics training  Other planned therapy interventions: graston, prn, muscle energy technique , joint mobilization ,   Frequency: 2x week  Duration in weeks: 8  Plan of Care beginning date: 2023  Plan of Care expiration date: 2023  Treatment plan discussed with: patient        Subjective Evaluation    History of Present Illness  Onset date: end of May 2023 s/p fall whle vacuuming  Mechanism of injury: Kisha Martell is a 67year old female referred to outpt PT s/p recent fall backwards while vacuuming  at the end of May of 2023  She reported landing on her coccyx bone now complaining of right SI, and left le inner groin and medial thigh pain and weakness  She reports unsteady gait and imbalance for the past 6 months reporting she frequently bangs her elbows into the walls and doorway while ambulating  [place] 6 months prior to this incident she report falling while going backwards down her steps unravelling [place] from the railing and sustaining a laceration to left side of her trunk and falling on her left side             Recurrent probem    Quality of life: good    Pain  Current pain ratin  At best pain rating: 3  At worst pain ratin  Location: right SI ,  left groin and hip pain  Quality: dull ache, tight, radiating, pulling and sharp  Relieving factors: change in position, heat, ice and rest  Aggravating factors: walking, standing, stair climbing and lifting  Progression: no change    Social Support  Steps to enter house: yes  Stairs in house: yes   Lives in: multiple-level home  Lives with: spouse    Employment status: working (owns building business, much computer work and much sitting)  Hand dominance: right  Exercise history: has access to a swimming pool   will try and utilize in the future,   likes to walk, has prior exer program that has not been performing to address pelvic obliquity    Treatments  Previous treatment: physical therapy  Current treatment: physical therapy  Patient Goals  Patient goals for therapy: decreased pain, improved balance, increased strength, independence with ADLs/IADLs and return to sport/leisure activities  Patient goal: reduction in pain by 80 percent and improve my balance with quick turns with walking and on uneven surfaces        Objective     Active Range of Motion     Lumbar   Flexion: Active lumbar flexion: 3/4 range  Extension: Active lumbar extension: 1/2 range pain right si , left groin  with pain  Left lateral flexion: Active left lumbar lateral flexion: 3/4 range        Right lateral flexion: Active right lumbar lateral flexion: 3/4 range  Left rotation: Active left lumbar rotation: 1/2 rotation     Right rotation: Active right lumbar rotation: 1/2 rotation    Left Hip   Flexion: WFL  Extension: 15 degrees   Abduction: WFL  Adduction: WFL  External rotation (90/90): 35 degrees   Internal rotation (90/90): 35 degrees     Right Hip   Flexion: WFL  Extension: WFL  Abduction: WFL  Adduction: WFL  External rotation (90/90): 45 degrees   Internal rotation (90/90): 40 degrees   Left Knee   Flexion: 105 degrees with pain  Extension: WFL    Right Knee   Flexion: 110 degrees with pain  Extension: WFL  Left Ankle/Foot   Dorsiflexion (ke): WFL  Plantar flexion: WFL    Right Ankle/Foot   Dorsiflexion (ke): WFL  Plantar flexion: [facility]    Additional Active Range of Motion Details  Mod quad tightness  Left ,   Right min ,   Left ITB tightness min  Tenderness left groin     Strength/Myotome Testing     Left Hip   Planes of Motion   Flexion: 4+  Extension: 4-  Abduction: 4  Adduction: 3+    Right Hip   Planes of Motion   Flexion: 5  Extension: 4+  Abduction: 5  Adduction: 4-    Left Knee   Flexion: 5  Extension: 5    Right Knee   Flexion: 5  Extension: 5    Left Ankle/Foot   Dorsiflexion: 5  Plantar flexion: 5    Right Ankle/Foot Dorsiflexion: 5  Plantar flexion: 5    Additional Strength Details  Abdominal strength 3/5 , trunk extension 4/5 mmt    Ambulation     Ambulation: Level Surfaces   Ambulation without assistive device: independent    Additional Level Surfaces Ambulation Details  150 ft  Occasional weaving during gait uneven step length,   Tandem gait close supervision 10 ft,  Tandem stance falls in 20 sec , sharpened romberg falls in 5 seconds  Hand to opposite knee gait close supervision 10 ft,  TUG  10 seconds     Ambulation: Stairs   Ascend stairs: independent  Pattern: reciprocal  Railings: one rail  Descend stairs: independent  Pattern: reciprocal  Railings: one rail    PMH: secondary hyperaldosteronism, asthma, hx of sinusitis, fibromuscular dysplagia, renovascular hypertension, osteoporosis, collagenous colitis, disc disorder, hysterectomy, s/p tubal ligation, FMD, HTN , incomplete emptying of bladder, OA, chronic back pain          Precautions: Allergies  Reviewed by You at 11:16 PM   Severity Reactions Comments   Misc   Sulfonamide Containing Compounds High Anaphylaxis    Sulfa Antibiotics High Anaphylaxis, Other (See Comments) rash   Other Not Specified  Annotation - [id number]: many inhalants, carries epi pen   Nabumetone Low Rash, Angioedema            Manuals 6/12/23             I eval             Left leg pull for left pelvic upslip man                                      Neuro Re-Ed             Tandem gait 1 min x 1            Tandem stance 30 sec x 3            Squats  3 x 10            sLr pre's                                                     Ther Ex                                                                                                                     Ther Activity                                       Gait Training                                       Modalities

## 2023-06-14 ENCOUNTER — OFFICE VISIT (OUTPATIENT)
Dept: URGENT CARE | Age: 73
End: 2023-06-14
Payer: MEDICARE

## 2023-06-14 ENCOUNTER — OFFICE VISIT (OUTPATIENT)
Dept: PHYSICAL THERAPY | Age: 73
End: 2023-06-14
Payer: MEDICARE

## 2023-06-14 VITALS
TEMPERATURE: 98.1 F | DIASTOLIC BLOOD PRESSURE: 78 MMHG | SYSTOLIC BLOOD PRESSURE: 128 MMHG | RESPIRATION RATE: 12 BRPM | OXYGEN SATURATION: 97 % | HEART RATE: 72 BPM

## 2023-06-14 DIAGNOSIS — R06.2 WHEEZING: Primary | ICD-10-CM

## 2023-06-14 DIAGNOSIS — R26.9 ABNORMALITY OF GAIT: Primary | ICD-10-CM

## 2023-06-14 DIAGNOSIS — M25.552 LEFT HIP PAIN: ICD-10-CM

## 2023-06-14 PROCEDURE — 97112 NEUROMUSCULAR REEDUCATION: CPT

## 2023-06-14 PROCEDURE — 99213 OFFICE O/P EST LOW 20 MIN: CPT

## 2023-06-14 PROCEDURE — G0463 HOSPITAL OUTPT CLINIC VISIT: HCPCS

## 2023-06-14 PROCEDURE — 97110 THERAPEUTIC EXERCISES: CPT

## 2023-06-14 RX ORDER — PREDNISONE 20 MG/1
20 TABLET ORAL 2 TIMES DAILY WITH MEALS
Qty: 10 TABLET | Refills: 0 | Status: SHIPPED | OUTPATIENT
Start: 2023-06-14 | End: 2023-06-19

## 2023-06-14 NOTE — PROGRESS NOTES
[facility]        NAME: Josefina Woodard is a 67 y o  female  : 1950    MRN: [id number]  DATE: 2023  TIME: 11:52 AM    Assessment and Plan   Wheezing [R06 2]  1  Wheezing  predniSONE 20 mg tablet            Patient Instructions       Please take prednisone with breakfast and dinner for the next 5 days  If symptoms persist, please follow-up with primary care provider  If symptoms worsen, please go to the emergency department  Chief Complaint     Chief Complaint   Patient presents with   • Shortness of Breath     Had URI 3 weeks ago with cough, chest tightness; took antibiotic and steroid; mucus is not clear   • Earache     Left ear pain yesterday         History of Present Illness       Patient with a history of intermittent asthma presenting for evaluation of respiratory symptoms  Patient states approximately 3 weeks ago she was treated for an upper respiratory infection with Augmentin and steroids  She states that she completed the treatment and began to feel mild improvement, but due to the poor air quality she began having an exacerbation of her symptoms  She states that over the past week she has been having worsening cough, chest tightness, wheezing, mucus production, shortness of breath, ear pain, congestion and runny nose  She states that she has been taking Qvar, albuterol, mometason and Robitussin with minimal relief of her symptoms  She denies any recent fevers or chills  States that she is concerned as she completed treatment and is still symptomatic  Shortness of Breath  Associated symptoms include coughing  Pertinent negatives include no chest pain  Earache   Associated symptoms include coughing  Pertinent negatives include no diarrhea or vomiting  Review of Systems   Review of Systems   Constitutional: Negative for chills and fever  HENT: Positive for congestion and ear pain      Respiratory: Positive for cough, chest tightness and shortness of breath  Cardiovascular: Negative for chest pain  Gastrointestinal: Negative for diarrhea, nausea and vomiting  All other systems reviewed and are negative  Current Medications       Current Outpatient Medications:   •  predniSONE 20 mg tablet, Take 1 tablet (20 mg total) by mouth 2 (two) times a day with meals for 5 days, Disp: 10 tablet, Rfl: 0  •  Albuterol Sulfate 108 (90 Base) MCG/ACT AEPB, Inhale 2 puffs every 4 (four) hours, Disp: , Rfl:   •  beclomethasone (QVAR) 40 MCG/ACT inhaler, Inhale 2 puffs, Disp: , Rfl:   •  brinzolamide (AZOPT) 1 % ophthalmic suspension, Azopt 1 % eye drops,suspension (Patient not taking: Reported on 11/17/2022), Disp: , Rfl:   •  budesonide (ENTOCORT EC) 3 MG capsule, TAKE 3 CAPSULES DAILY   (Patient not taking: Reported on 11/17/2022), Disp: , Rfl:   •  chlorhexidine (PERIDEX) 0 12 % solution, chlorhexidine gluconate 0 12 % mouthwash (Patient not taking: No sig reported), Disp: , Rfl:   •  Cholecalciferol 5000 units capsule, Take 6,000 Units by mouth daily, Disp: , Rfl:   •  clarithromycin (BIAXIN XL) 500 MG 24 hr tablet, Take 1,000 mg by mouth daily (Patient not taking: Reported on 5/28/2023), Disp: , Rfl:   •  clobetasol (TEMOVATE) 0 05 % GEL, clobetasol 0 05 % topical gel (Patient not taking: Reported on 11/17/2022), Disp: , Rfl:   •  Dicyclomine HCl (BENTYL PO), Take by mouth (Patient not taking: Reported on 11/17/2022), Disp: , Rfl:   •  estradiol (ESTRACE) 0 1 mg/g vaginal cream, Insert 8 21 application into the vagina once as needed (Patient not taking: Reported on 11/17/2022), Disp: , Rfl:   •  fexofenadine (ALLEGRA) 180 MG tablet, , Disp: , Rfl:   •  losartan (COZAAR) 25 mg tablet, Take 1 tablet (25 mg total) by mouth 2 (two) times a day, Disp: 180 tablet, Rfl: 3  •  LUMIGAN 0 01 % ophthalmic drops, Administer 0 001 drops to both eyes 2 (two) times a day, Disp: , Rfl: 11  •  methylPREDNISolone 4 MG tablet therapy pack, Use as directed on package, Disp: 1 each, Rfl: 0  •  Omega-3 Fatty Acids (FISH OIL) 1,000 mg, Take 1,000 mg by mouth daily, Disp: , Rfl:   •  venlafaxine (EFFEXOR-XR) 37 5 mg 24 hr capsule, Take 37 5 mg by mouth once, Disp: , Rfl:   •  Venlafaxine HCl (EFFEXOR PO), Take 75 tablets by mouth once, Disp: , Rfl:     Current Allergies     Allergies as of 06/14/2023 - Reviewed 06/14/2023   Allergen Reaction Noted   • Misc  sulfonamide containing compounds Anaphylaxis 12/02/2008   • Sulfa antibiotics Anaphylaxis and Other (See Comments) 03/26/2010   • Other  01/26/2016   • Nabumetone Rash and Angioedema 04/16/2015            The following portions of the patient's history were reviewed and updated as appropriate: allergies, current medications, past family history, past medical history, past social history, past surgical history and problem list      Past Medical History:   Diagnosis Date   • Asymptomatic spider vein    • Collagenous colitis    • Disc disorder    • Female bladder prolapse    • Fibromuscular dysplasia ([facility] [address] )    • FMD (facioscapulohumeral muscular dystrophy) ([facility] [address] )    • Hypertension    • Incomplete emptying of bladder    • Osteoarthritis    • Secondary hyperaldosteronism ([facility] [address] )        Past Surgical History:   Procedure Laterality Date   • HYSTERECTOMY     • TUBAL LIGATION         Family History   Problem Relation Age of Onset   • Gout Mother    • Heart disease Mother    • Hypertension Mother    • Diabetes Father    • Heart disease Father    • Hypertension Father    • Gout Sister    • Heart disease Sister    • Hypertension Sister    • Gout Brother    • Heart disease Brother    • Hypertension Brother          Medications have been verified  Objective   /78 (BP Location: Right arm, Patient Position: Sitting, Cuff Size: Large)   Pulse 72   Temp 98 1 °F (36 7 °C) (Temporal)   Resp 12   SpO2 97%        Physical Exam     Physical Exam  Vitals and nursing note reviewed  Constitutional:       General: She is not in acute distress  Appearance: Normal appearance  She is normal weight  She is not ill-appearing, toxic-appearing or diaphoretic  HENT:      Head: Normocephalic and atraumatic  Right Ear: There is impacted cerumen  Left Ear: Tympanic membrane normal       Nose: Congestion present  No rhinorrhea  Mouth/Throat:      Mouth: Mucous membranes are moist       Pharynx: Oropharynx is clear  No oropharyngeal exudate or posterior oropharyngeal erythema  Eyes:      General:         Right eye: No discharge  Left eye: No discharge  Cardiovascular:      Rate and Rhythm: Normal rate and regular rhythm  Pulses: Normal pulses  Heart sounds: Normal heart sounds  No murmur heard  No friction rub  No gallop  Pulmonary:      Effort: Pulmonary effort is normal  No respiratory distress  Breath sounds: No stridor  Wheezing (scattered wheezes - cleared with cough) present  No rhonchi or rales  Chest:      Chest wall: No tenderness  Abdominal:      General: Bowel sounds are normal       Palpations: Abdomen is soft  Tenderness: There is no abdominal tenderness  Skin:     General: Skin is warm and dry  Neurological:      Mental Status: She is alert     Psychiatric:         Mood and Affect: Mood normal          Behavior: Behavior normal

## 2023-06-14 NOTE — PROGRESS NOTES
"Daily Note     Today's date: 2023  Patient name: Gisell May  : 1950  MRN: [id number]  Referring provider: Urbano Henry MD  Dx:   Encounter Diagnosis     ICD-10-CM    1  Abnormality of gait  R26 9       2  Left hip pain  M25 552                      Subjective: \"My left groin doesn't hurt but both of my hips on the outside are sore '      Objective: See treatment diary below      Assessment: Tolerated treatment well  Patient would benefit from continued PT  Pt issued written home exer program today      Plan: Continue per plan of care  Precautions: Allergies  Reviewed by You at 11:16 PM   Severity Reactions Comments   Misc   Sulfonamide Containing Compounds High Anaphylaxis    Sulfa Antibiotics High Anaphylaxis, Other (See Comments) rash   Other Not Specified  Annotation - [id number]: many inhalants, carries epi pen   Nabumetone Low Rash, Angioedema            Manuals 23            I eval             Left leg pull for left pelvic upslip man                                      Neuro Re-Ed             Tandem gait 1 min x 1 1 min x 1           Tandem stance 30 sec x 3 30 sec x 3           Squats  3 x 10 3 x 20 reps            sLr pre's   2 x 10           Hip ext with knee flexion  2 x 10           Nu step   10 min r 3                        Ther Ex                                                                                                                     Ther Activity                                       Gait Training                                       Modalities                                            "

## 2023-06-14 NOTE — PATIENT INSTRUCTIONS
Please take prednisone with breakfast and dinner for the next 5 days  If symptoms persist, please follow-up with primary care provider  If symptoms worsen, please go to the emergency department

## 2023-06-19 ENCOUNTER — OFFICE VISIT (OUTPATIENT)
Dept: PHYSICAL THERAPY | Age: 73
End: 2023-06-19
Payer: MEDICARE

## 2023-06-19 DIAGNOSIS — R26.9 ABNORMALITY OF GAIT: Primary | ICD-10-CM

## 2023-06-19 DIAGNOSIS — M25.552 LEFT HIP PAIN: ICD-10-CM

## 2023-06-19 PROCEDURE — 97110 THERAPEUTIC EXERCISES: CPT

## 2023-06-19 PROCEDURE — 97112 NEUROMUSCULAR REEDUCATION: CPT

## 2023-06-20 NOTE — PROGRESS NOTES
"Daily Note     Today's date: 2023  Patient name: Aiden Cohen  : 1950  MRN: [id number]  Referring provider: Claudell Bills , MD  Dx:   Encounter Diagnosis     ICD-10-CM    1  Abnormality of gait  R26 9       2  Left hip pain  M25 552                      Subjective: \"I'm still congested in my chest   Pt observed with red eyes   She reports having one more day on her steroid medication  Pt to see her family dr Anthony De La O  Pt believes the steroid medication is helping her hip pain and right si pain as no pain today in these areas  Objective: See treatment diary below      Assessment: Tolerated treatment well  Patient would benefit from continued PT      Plan: Continue per plan of care  Precautions: Allergies  Reviewed by You at 11:16 PM   Severity Reactions Comments   Misc   Sulfonamide Containing Compounds High Anaphylaxis    Sulfa Antibiotics High Anaphylaxis, Other (See Comments) rash   Other Not Specified  Annotation - [id number]: many inhalants, carries epi pen   Nabumetone Low Rash, Angioedema            Manuals 23           I eval             Left leg pull for left pelvic upslip man                                      Neuro Re-Ed             Tandem gait 1 min x 1 1 min x 1           Tandem stance 30 sec x 3 30 sec x 3           Squats  3 x 10 3 x 20 reps  3 x 10          sLr pre's   2 x 10 2 x 10          Hip ext with knee flexion  2 x 10 2 x 10          Nu step   10 min r 3 15 min r 4          Standing back bends   2 sets of 10           Ther Ex                                                                                                                     Ther Activity                                       Gait Training                                       Modalities                                            "

## 2023-06-21 ENCOUNTER — OFFICE VISIT (OUTPATIENT)
Dept: PHYSICAL THERAPY | Age: 73
End: 2023-06-21
Payer: MEDICARE

## 2023-06-21 DIAGNOSIS — R26.9 ABNORMALITY OF GAIT: Primary | ICD-10-CM

## 2023-06-21 DIAGNOSIS — M25.552 LEFT HIP PAIN: ICD-10-CM

## 2023-06-21 PROCEDURE — 97110 THERAPEUTIC EXERCISES: CPT

## 2023-06-21 PROCEDURE — 97112 NEUROMUSCULAR REEDUCATION: CPT

## 2023-06-21 NOTE — PROGRESS NOTES
"Daily Note     Today's date: 2023  Patient name: Angelica Curtis  : 1950  MRN: [id number]  Referring provider: Carlitos Charles MD  Dx:   Encounter Diagnosis     ICD-10-CM    1  Abnormality of gait  R26 9       2  Left hip pain  M25 552                      Subjective: \"I don't have any pain today but I didn't get to see the family doctor yet about my congestion and breathing  I do feel fatigued  \"      Objective: See treatment diary below      Assessment: Tolerated treatment well  Patient would benefit from continued PT      Plan: Continue per plan of care  Precautions: Allergies  Reviewed by You at 11:16 PM   Severity Reactions Comments   Misc   Sulfonamide Containing Compounds High Anaphylaxis    Sulfa Antibiotics High Anaphylaxis, Other (See Comments) rash   Other Not Specified  Annotation - [id number]: many inhalants, carries epi pen   Nabumetone Low Rash, Angioedema            Manuals 23          I eval             Left leg pull for left pelvic upslip man                                      Neuro Re-Ed             Tandem gait 1 min x 1 1 min x 1  1 min x 1         Tandem stance 30 sec x 3 30 sec x 3  30 sec x 3         Squats  3 x 10 3 x 20 reps  3 x 10 3 x 10         sLr pre's   2 x 10 2 x 10 3 x 10         Hip ext with knee flexion  2 x 10 2 x 10 3 x 10         Nu step   10 min r 3 15 min r 4 15 min r 5         Standing back bends   2 sets of 10  1 set of 10         Ther Ex                                                                                                                     Ther Activity                                       Gait Training                                       Modalities                                            "

## 2023-06-27 ENCOUNTER — APPOINTMENT (OUTPATIENT)
Dept: PHYSICAL THERAPY | Age: 73
End: 2023-06-27
Payer: MEDICARE

## 2023-06-29 ENCOUNTER — OFFICE VISIT (OUTPATIENT)
Dept: PHYSICAL THERAPY | Age: 73
End: 2023-06-29
Payer: MEDICARE

## 2023-06-29 DIAGNOSIS — R26.9 ABNORMALITY OF GAIT: Primary | ICD-10-CM

## 2023-06-29 PROCEDURE — 97110 THERAPEUTIC EXERCISES: CPT

## 2023-06-29 NOTE — PROGRESS NOTES
Daily Note     Today's date: 2023  Patient name: Eva Giang  : 1950  MRN: [id number]  Referring provider: Carlos Membreno MD  Dx:   Encounter Diagnosis     ICD-10-CM    1  Abnormality of gait  R26 9                      Subjective: Difficutly with ball toss in the barker  Balance work needed  Objective: See treatment diary below      Assessment: Tolerated treatment well  Patient would benefit from continued PT      Plan: Continue per plan of care  Precautions: Allergies  Reviewed by You at 11:16 PM   Severity Reactions Comments   Misc   Sulfonamide Containing Compounds High Anaphylaxis    Sulfa Antibiotics High Anaphylaxis, Other (See Comments) rash   Other Not Specified  Annotation - [id number]: many inhalants, carries epi pen   Nabumetone Low Rash, Angioedema            Manuals 23         I eval             Left leg pull for left pelvic upslip man                                      Neuro Re-Ed             Tandem gait 1 min x 1 1 min x 1  1 min x 1 1 min        Tandem stance 30 sec x 3 30 sec x 3  30 sec x 3         Squats  3 x 10 3 x 20 reps  3 x 10 3 x 10 30x        sLr pre's   2 x 10 2 x 10 3 x 10         Hip ext with knee flexion  2 x 10 2 x 10 3 x 10 30x        Nu step   10 min r 3 15 min r 4 15 min r 5 15 min        Standing back bends   2 sets of 10  1 set of 10 10x        Ther Ex             Ball in barker     100ft x 2        HSS     20sec x 5        Prone quad     20sec  x5        bridging     30x        Ball toss to self     40ft x 2                                               Ther Activity                                       Gait Training                                       Modalities

## 2023-07-03 ENCOUNTER — OFFICE VISIT (OUTPATIENT)
Dept: PHYSICAL THERAPY | Age: 73
End: 2023-07-03
Payer: MEDICARE

## 2023-07-03 DIAGNOSIS — R26.9 ABNORMALITY OF GAIT: Primary | ICD-10-CM

## 2023-07-03 DIAGNOSIS — M25.552 LEFT HIP PAIN: ICD-10-CM

## 2023-07-03 PROCEDURE — 97110 THERAPEUTIC EXERCISES: CPT

## 2023-07-03 PROCEDURE — 97530 THERAPEUTIC ACTIVITIES: CPT

## 2023-07-03 PROCEDURE — 97112 NEUROMUSCULAR REEDUCATION: CPT

## 2023-07-03 NOTE — PROGRESS NOTES
Daily Note     Today's date: 7/3/2023  Patient name: Sara Chacon  : 1950  MRN: [id number]  Referring provider: Vishal Sepulveda MD  Dx:   Encounter Diagnosis     ICD-10-CM    1. Abnormality of gait  R26.9       2. Left hip pain  M25.552                      Subjective: "I no longer have left hip pain but my balance is off." Graciela SARGENT pta supervising units utilized      Objective: See treatment diary below. Pt to not utilize trifocal glasses with ball toss activities . Use computer screen glasses while at work on computer . Use 2 lense glasses for daily activity. Difficulty with VOR 2 exer today. PT no longer with hip or low back pain. Emphasis on balance in the future. Assessment: Tolerated treatment well. Patient would benefit from continued PT      Plan: Continue per plan of care. Precautions: Allergies  Reviewed by You at 11:16 PM   Severity Reactions Comments   Misc.  Sulfonamide Containing Compounds High Anaphylaxis    Sulfa Antibiotics High Anaphylaxis, Other (See Comments) rash   Other Not Specified  Annotation - [id number]: many inhalants, carries epi pen   Nabumetone Low Rash, Angioedema            Manuals 6/12/23 6/14 6/19 6/21 6/29 7/3        I eval             Left leg pull for left pelvic upslip man     Not needed                                 Neuro Re-Ed             Tandem gait 1 min x 1 1 min x 1  1 min x 1 1 min 1 min x 1       Tandem stance 30 sec x 3 30 sec x 3  30 sec x 3         Squats  3 x 10 3 x 20 reps  3 x 10 3 x 10 30x 3 x 10 look thru blinds       sLr pre's   2 x 10 2 x 10 3 x 10  3 x 10       Hip ext with knee flexion  2 x 10 2 x 10 3 x 10 30x 30 x       Nu step   10 min r 3 15 min r 4 15 min r 5 15 min 15 min x r 5       Standing back bends   2 sets of 10  1 set of 10 10x        Ther Ex             Ball in barker r/l and sidestepping ball toss on front wall      100ft x 2 100 ft x 2       HSS     20sec x 5 20 sec x 5       Prone quad     20sec  x5 1 min x 1 bridging     30x 3 x 10       Ball toss to self     40ft x 2 40 ft x 2             Vor, 1 and vor 2 difficulty x 10 reps vor 2                                 Ther Activity                                       Gait Training                                       Modalities

## 2023-07-06 ENCOUNTER — OFFICE VISIT (OUTPATIENT)
Dept: PHYSICAL THERAPY | Age: 73
End: 2023-07-06
Payer: MEDICARE

## 2023-07-06 DIAGNOSIS — M25.552 LEFT HIP PAIN: ICD-10-CM

## 2023-07-06 DIAGNOSIS — R26.9 ABNORMALITY OF GAIT: Primary | ICD-10-CM

## 2023-07-06 PROCEDURE — 97110 THERAPEUTIC EXERCISES: CPT

## 2023-07-06 PROCEDURE — 97112 NEUROMUSCULAR REEDUCATION: CPT

## 2023-07-06 NOTE — PROGRESS NOTES
Daily Note     Today's date: 2023  Patient name: Anjel Fraser  : 1950  MRN: [id number]  Referring provider: Mavis Caba MD  Dx:   Encounter Diagnosis     ICD-10-CM    1. Abnormality of gait  R26.9       2. Left hip pain  M25.552           Start Time: 0900  Stop Time: 1000  Total time in clinic (min): 60 minutes    Subjective: Pt notes no pain in hips today - has been taking steroid for lungs and feels this has helped her hip pain. At this point, pt feels balance is her greatest deficit. Notes she loses her balance after prolonged walking with visual fixation when walking to the alter for communion at [employer]. Objective: See treatment diary below    [employer] mCTSIB WNL, scanned in    Assessment: Tolerated treatment well focused on LE/core strengthening and dynamic balance exercises. Pt denies L hip pain today; strength improvements demonstrated by good tolerance of progressions per treatment diary. Balance and vestibular deficits demonstrated by occasional LOB with step strategy during tandem gait, gait with head turns and gait with VORx1. No apparent deficits following gait with visual fixation. Patient would benefit from continued PT to address LE strength and balance deficits. Plan: Continue per plan of care. Precautions: Allergies  Reviewed by You at 11:16 PM   Severity Reactions Comments   Misc.  Sulfonamide Containing Compounds High Anaphylaxis    Sulfa Antibiotics High Anaphylaxis, Other (See Comments) rash   Other Not Specified  Annotation - [id number]: many inhalants, carries epi pen   Nabumetone Low Rash, Angioedema            Manuals 23 7/3 7/6       I eval             Left leg pull for left pelvic upslip man     Not needed                                 Neuro Re-Ed             Tandem gait 1 min x 1 1 min x 1  1 min x 1 1 min 1 min x 1 2x50 ft      Tandem stance 30 sec x 3 30 sec x 3  30 sec x 3         Squats  3 x 10 3 x 20 reps  3 x 10 3 x 10 30x 3 x 10 look thru blinds 2 x 20, 4 sec ecc      sLr pre's   2 x 10 2 x 10 3 x 10  3 x 10 SLR 2.5# x20      TrA c PPT       20 x 5 sec      TrA c deadbug hold       5 x 5 sec       TrA c marching       2 x 10                                             Hip ext with knee flexion  2 x 10 2 x 10 3 x 10 30x 30 x       Nu step   10 min r 3 15 min r 4 15 min r 5 15 min 15 min x r 5 15 min x R5      Standing back bends   2 sets of 10  1 set of 10 10x        Gait with head turns       2x50 ft      Gait with VORx1       2x50 ft      Gait with visual fixation       2x50 ft                                             Ther Ex             Ball in barker r/l and sidestepping ball toss on front wall      100ft x 2 100 ft x 2       HSS     20sec x 5 20 sec x 5       Prone quad     20sec  x5 1 min x 1       bridging     30x 3 x 10 2x15 c yellow TB      Ball toss to self     40ft x 2 40 ft x 2 40 ft x2            Vor, 1 and vor 2 difficulty x 10 reps vor 2 VORx1 standing x30 sec                                    Ther Activity                                       Gait Training                                       Modalities

## 2023-07-11 ENCOUNTER — APPOINTMENT (OUTPATIENT)
Dept: PHYSICAL THERAPY | Age: 73
End: 2023-07-11
Payer: MEDICARE

## 2023-07-12 ENCOUNTER — APPOINTMENT (OUTPATIENT)
Dept: PHYSICAL THERAPY | Age: 73
End: 2023-07-12
Payer: MEDICARE

## 2023-07-13 ENCOUNTER — OFFICE VISIT (OUTPATIENT)
Dept: PHYSICAL THERAPY | Age: 73
End: 2023-07-13
Payer: MEDICARE

## 2023-07-13 DIAGNOSIS — R26.9 ABNORMALITY OF GAIT: Primary | ICD-10-CM

## 2023-07-13 PROCEDURE — 97112 NEUROMUSCULAR REEDUCATION: CPT

## 2023-07-13 PROCEDURE — 97110 THERAPEUTIC EXERCISES: CPT

## 2023-07-13 NOTE — PROGRESS NOTES
Daily Note     Today's date: 2023  Patient name: Velma Giordano  : 1950  MRN: [id number]  Referring provider: Bassam Real MD  Dx:   Encounter Diagnosis     ICD-10-CM    1. Abnormality of gait  R26.9                      Subjective: "I am doing well but I believe the steroids are now wearing off."      Objective: See treatment diary below      Assessment: Tolerated treatment well. Patient would benefit from continued PT. Progress towards discharge in 1 week pending continued progress. Plan: Continue per plan of care. Precautions: Allergies  Reviewed by You at 11:16 PM   Severity Reactions Comments   Misc. Sulfonamide Containing Compounds High Anaphylaxis    Sulfa Antibiotics High Anaphylaxis, Other (See Comments) rash   Other Not Specified  Annotation - [id number]: many inhalants, carries epi pen   Nabumetone Low Rash, Angioedema            Manuals 23 7/3 7/6 7/13      I eval             Left leg pull for left pelvic upslip man     Not needed                                 Neuro Re-Ed             Tandem gait 1 min x 1 1 min x 1  1 min x 1 1 min 1 min x 1 2x50 ft      Tandem stance 30 sec x 3 30 sec x 3  30 sec x 3         Squats  3 x 10 3 x 20 reps  3 x 10 3 x 10 30x 3 x 10 look thru blinds 2 x 20, 4 sec ecc 3 sest of 15     sLr pre's prone hip ext knee extended and knee flexed.   2 x 10 2 x 10 3 x 10  3 x 10 SLR 2.5# x20 3# 3 x 10     TrA c PPT       20 x 5 sec      TrA c deadbug hold       5 x 5 sec       TrA c marching       2 x 10                                             Hip ext with knee flexion  2 x 10 2 x 10 3 x 10 30x 30 x  3 x 10 3#     Nu step   10 min r 3 15 min r 4 15 min r 5 15 min 15 min x r 5 15 min x R5 15 min r 5     Standing back bends   2 sets of 10  1 set of 10 10x        Gait with head turns       2x50 ft      Gait with VORx1       2x50 ft      Gait with visual fixation       2x50 ft Benson Chong in barker r/l and sidestepping ball toss on front wall      100ft x 2 100 ft x 2       HSS     20sec x 5 20 sec x 5  1 min x 1 with strap      Prone quad     20sec  x5 1 min x 1  1 min x 1     bridging     30x 3 x 10 2x15 c yellow TB 3 x 15     Ball toss to self     40ft x 2 40 ft x 2 40 ft x2            Vor, 1 and vor 2 difficulty x 10 reps vor 2 VORx1 standing x30 sec                                    Ther Activity                                       Gait Training                                       Modalities

## 2023-07-17 ENCOUNTER — APPOINTMENT (OUTPATIENT)
Dept: PHYSICAL THERAPY | Age: 73
End: 2023-07-17
Payer: MEDICARE

## 2023-09-08 ENCOUNTER — TELEPHONE (OUTPATIENT)
Dept: NEPHROLOGY | Facility: CLINIC | Age: 73
End: 2023-09-08

## 2023-09-08 DIAGNOSIS — E26.1 SECONDARY HYPERALDOSTERONISM (HCC): Primary | ICD-10-CM

## 2023-09-08 DIAGNOSIS — M81.0 OSTEOPOROSIS, UNSPECIFIED OSTEOPOROSIS TYPE, UNSPECIFIED PATHOLOGICAL FRACTURE PRESENCE: ICD-10-CM

## 2023-09-08 NOTE — TELEPHONE ENCOUNTER
Pt called to schedule follow up appt. She was due in Nov with Dr Fragoso Friend, however she requested a transfer of care to Dr Anamika Webber. She stated her sons both see Dr Anamika Webber and she would like to be under his care. Pt was scheduled for a 40m transfer of care appt with Dr Anamika Webber on 1/31/24 in [address] [place] [address]. Will she need her labs reordered or any different labs for this appt? Please advise. Thank you!

## 2023-09-11 NOTE — TELEPHONE ENCOUNTER
Called patient and went over the following information:    Please have BMP drawn before the transfer of care appointment with Dr. Luz Marina Valera. Patient verbally understood. Patient requested to be seen sooner then January. I have moved the patients appointment to 11/29/23. BMP has been added to the patients chart.

## 2023-11-01 ENCOUNTER — NEW PATIENT COMPREHENSIVE (OUTPATIENT)
Dept: URBAN - METROPOLITAN AREA CLINIC 6 | Facility: CLINIC | Age: 73
End: 2023-11-01

## 2023-11-01 DIAGNOSIS — D31.32: ICD-10-CM

## 2023-11-01 DIAGNOSIS — H40.1131: ICD-10-CM

## 2023-11-01 DIAGNOSIS — H25.813: ICD-10-CM

## 2023-11-01 PROCEDURE — 92133 CPTRZD OPH DX IMG PST SGM ON: CPT

## 2023-11-01 PROCEDURE — 76514 ECHO EXAM OF EYE THICKNESS: CPT

## 2023-11-01 PROCEDURE — 99204 OFFICE O/P NEW MOD 45 MIN: CPT

## 2023-11-01 PROCEDURE — 92015 DETERMINE REFRACTIVE STATE: CPT

## 2023-11-01 ASSESSMENT — VISUAL ACUITY
OD_CC: 20/25
OD_CC: J1
OS_CC: 20/30+1
OS_CC: J1

## 2023-11-01 ASSESSMENT — PACHYMETRY
OD_CT_UM: 551
OS_CT_UM: 526

## 2023-11-01 ASSESSMENT — TONOMETRY
OD_IOP_MMHG: 16
OS_IOP_MMHG: 18

## 2023-11-02 ENCOUNTER — APPOINTMENT (OUTPATIENT)
Dept: LAB | Age: 73
End: 2023-11-02
Payer: MEDICARE

## 2023-11-02 DIAGNOSIS — E26.1 SECONDARY HYPERALDOSTERONISM (HCC): ICD-10-CM

## 2023-11-02 DIAGNOSIS — I15.0 RENOVASCULAR HYPERTENSION: ICD-10-CM

## 2023-11-02 DIAGNOSIS — I77.3 FIBROMUSCULAR DYSPLASIA (HCC): ICD-10-CM

## 2023-11-02 DIAGNOSIS — M81.0 OSTEOPOROSIS, UNSPECIFIED OSTEOPOROSIS TYPE, UNSPECIFIED PATHOLOGICAL FRACTURE PRESENCE: ICD-10-CM

## 2023-11-02 LAB
ALBUMIN SERPL BCP-MCNC: 4 G/DL (ref 3.5–5)
ALP SERPL-CCNC: 59 U/L (ref 34–104)
ALT SERPL W P-5'-P-CCNC: 17 U/L (ref 7–52)
ANION GAP SERPL CALCULATED.3IONS-SCNC: 8 MMOL/L
AST SERPL W P-5'-P-CCNC: 16 U/L (ref 13–39)
BACTERIA UR QL AUTO: NORMAL /HPF
BILIRUB SERPL-MCNC: 0.42 MG/DL (ref 0.2–1)
BILIRUB UR QL STRIP: NEGATIVE
BUN SERPL-MCNC: 17 MG/DL (ref 5–25)
CALCIUM SERPL-MCNC: 9.2 MG/DL (ref 8.4–10.2)
CHLORIDE SERPL-SCNC: 107 MMOL/L (ref 96–108)
CLARITY UR: CLEAR
CO2 SERPL-SCNC: 27 MMOL/L (ref 21–32)
COLOR UR: NORMAL
CREAT SERPL-MCNC: 0.58 MG/DL (ref 0.6–1.3)
ERYTHROCYTE [DISTWIDTH] IN BLOOD BY AUTOMATED COUNT: 13.1 % (ref 11.6–15.1)
GFR SERPL CREATININE-BSD FRML MDRD: 91 ML/MIN/1.73SQ M
GLUCOSE P FAST SERPL-MCNC: 77 MG/DL (ref 65–99)
GLUCOSE UR STRIP-MCNC: NEGATIVE MG/DL
HCT VFR BLD AUTO: 42.6 % (ref 34.8–46.1)
HGB BLD-MCNC: 14.3 G/DL (ref 11.5–15.4)
HGB UR QL STRIP.AUTO: NEGATIVE
KETONES UR STRIP-MCNC: NEGATIVE MG/DL
LEUKOCYTE ESTERASE UR QL STRIP: NEGATIVE
MAGNESIUM SERPL-MCNC: 2.2 MG/DL (ref 1.9–2.7)
MCH RBC QN AUTO: 31.9 PG (ref 26.8–34.3)
MCHC RBC AUTO-ENTMCNC: 33.6 G/DL (ref 31.4–37.4)
MCV RBC AUTO: 95 FL (ref 82–98)
NITRITE UR QL STRIP: NEGATIVE
NON-SQ EPI CELLS URNS QL MICRO: NORMAL /HPF
PH UR STRIP.AUTO: 6.5 [PH]
PHOSPHATE SERPL-MCNC: 3.7 MG/DL (ref 2.3–4.1)
PLATELET # BLD AUTO: 212 THOUSANDS/UL (ref 149–390)
PMV BLD AUTO: 9 FL (ref 8.9–12.7)
POTASSIUM SERPL-SCNC: 4.3 MMOL/L (ref 3.5–5.3)
PROT SERPL-MCNC: 6.2 G/DL (ref 6.4–8.4)
PROT UR STRIP-MCNC: NEGATIVE MG/DL
RBC # BLD AUTO: 4.48 MILLION/UL (ref 3.81–5.12)
RBC #/AREA URNS AUTO: NORMAL /HPF
SODIUM SERPL-SCNC: 142 MMOL/L (ref 135–147)
SP GR UR STRIP.AUTO: 1.02 (ref 1–1.03)
URATE SERPL-MCNC: 4 MG/DL (ref 2–7.5)
UROBILINOGEN UR STRIP-ACNC: <2 MG/DL
WBC # BLD AUTO: 4.71 THOUSAND/UL (ref 4.31–10.16)
WBC #/AREA URNS AUTO: NORMAL /HPF

## 2023-11-02 PROCEDURE — 84100 ASSAY OF PHOSPHORUS: CPT

## 2023-11-02 PROCEDURE — 84550 ASSAY OF BLOOD/URIC ACID: CPT

## 2023-11-02 PROCEDURE — 36415 COLL VENOUS BLD VENIPUNCTURE: CPT

## 2023-11-02 PROCEDURE — 81001 URINALYSIS AUTO W/SCOPE: CPT

## 2023-11-02 PROCEDURE — 83735 ASSAY OF MAGNESIUM: CPT

## 2023-11-02 PROCEDURE — 80053 COMPREHEN METABOLIC PANEL: CPT

## 2023-11-02 PROCEDURE — 85027 COMPLETE CBC AUTOMATED: CPT

## 2023-11-07 ENCOUNTER — TELEPHONE (OUTPATIENT)
Dept: NEPHROLOGY | Facility: CLINIC | Age: 73
End: 2023-11-07

## 2023-11-07 DIAGNOSIS — I15.0 RENOVASCULAR HYPERTENSION: Primary | ICD-10-CM

## 2023-11-07 NOTE — TELEPHONE ENCOUNTER
Patient called and was wondering if her cholesterol could be checked as she is worried about her renal artery. Please advise if you would like me to add this test before her follow up on 11/29.

## 2023-11-21 ENCOUNTER — TELEPHONE (OUTPATIENT)
Age: 73
End: 2023-11-21

## 2023-11-21 NOTE — TELEPHONE ENCOUNTER
Called pt to let her know of an opening with Dr Stacy Levine in [place] on 12/6/23 at 3:15pm to see if she wanted to move her current appt    Lft message asking pt to call back if interested, but that appt may not still be available when she calls    If still avail, please r/s pt's current Feb 22 appt to this one, thanks!

## 2023-11-25 ENCOUNTER — APPOINTMENT (OUTPATIENT)
Dept: LAB | Facility: MEDICAL CENTER | Age: 73
End: 2023-11-25
Payer: MEDICARE

## 2023-11-25 DIAGNOSIS — I15.0 RENOVASCULAR HYPERTENSION: ICD-10-CM

## 2023-11-25 LAB
CHOLEST SERPL-MCNC: 145 MG/DL
HDLC SERPL-MCNC: 55 MG/DL
LDLC SERPL CALC-MCNC: 72 MG/DL (ref 0–100)
NONHDLC SERPL-MCNC: 90 MG/DL
TRIGL SERPL-MCNC: 92 MG/DL

## 2023-11-25 PROCEDURE — 80061 LIPID PANEL: CPT

## 2023-11-25 PROCEDURE — 36415 COLL VENOUS BLD VENIPUNCTURE: CPT

## 2023-11-29 ENCOUNTER — OFFICE VISIT (OUTPATIENT)
Dept: NEPHROLOGY | Facility: CLINIC | Age: 73
End: 2023-11-29
Payer: MEDICARE

## 2023-11-29 VITALS
DIASTOLIC BLOOD PRESSURE: 80 MMHG | SYSTOLIC BLOOD PRESSURE: 148 MMHG | HEIGHT: 64 IN | HEART RATE: 70 BPM | BODY MASS INDEX: 27.12 KG/M2

## 2023-11-29 DIAGNOSIS — I15.0 RENOVASCULAR HYPERTENSION: ICD-10-CM

## 2023-11-29 DIAGNOSIS — I77.3 FIBROMUSCULAR DYSPLASIA (HCC): Primary | ICD-10-CM

## 2023-11-29 PROBLEM — E26.1 SECONDARY HYPERALDOSTERONISM (HCC): Status: RESOLVED | Noted: 2019-09-11 | Resolved: 2023-11-29

## 2023-11-29 PROCEDURE — 99214 OFFICE O/P EST MOD 30 MIN: CPT | Performed by: INTERNAL MEDICINE

## 2023-11-29 RX ORDER — MOMETASONE FUROATE 50 UG/1
SPRAY, METERED NASAL
COMMUNITY
Start: 2023-10-24

## 2023-11-29 RX ORDER — DIPHENOXYLATE HYDROCHLORIDE AND ATROPINE SULFATE 2.5; .025 MG/1; MG/1
1 TABLET ORAL DAILY
COMMUNITY

## 2023-11-29 RX ORDER — OLOPATADINE HYDROCHLORIDE 665 UG/1
SPRAY NASAL
COMMUNITY
Start: 2023-10-24

## 2023-11-29 RX ORDER — ASPIRIN 81 MG/1
TABLET, CHEWABLE ORAL
COMMUNITY
Start: 2023-11-10

## 2023-11-29 NOTE — PROGRESS NOTES
NEPHROLOGY PROGRESS NOTE    Shira Petit 75 y.o. female MRN: [id number]  Unit/Bed#:  Encounter: [id number]  Reason for Consult: Hypertension with fibromuscular dysplasia    This is the first visit I had with the patient she was very pleasant and informative and looks great. She has a history of hypertension and was diagnosed with fibromuscular dysplasia about 10 years ago when she was being evaluated to potentially donate a kidney to her . She described to me that over the last couple years she has had to increase her losartan dose and she is just here for routine follow-up with no acute complaints. ASSESSMENT/PLAN:  1. Hypertension    The patient potentially has secondary hypertension due to fibromuscular dysplasia which is a form of renal artery stenosis. She informed me that this was done and diagnosed as above and that she actually had an attempted procedure with interventional radiology and was told that there was no ability to intervene and fix the problem because there were beads or aneurysms down the whole length of the artery. She does not remember even having bad high blood pressure at the time. Since then she has been placed on losartan blood pressure is borderline and she is on 100 mg a day. Renal function is normal as are electrolytes. I am going to obtain the results of the arteriogram just so I can see how extensive the FMD was. If there is no plan to intervene based on the anatomy and there is not necessarily need to keep doing renal Dopplers although it was stable in January of this year with a right renal stenosis of 60-99% in the mid artery. I told her that we can add medications if her blood pressure is not well-controlled but am first going to obtain the results of the testing above and then we will contact the patient to determine if that is needed and when follow-up will be necessary.     I have spent a total time of 31 minutes on 11/29/23 in caring for this patient including Diagnostic results, Impressions, Reviewing / ordering tests, medicine, procedures  , and Obtaining or reviewing history  . The patient did ask me about the adrenal gland issue and I explained to her that if fibromuscular dysplasia as a cause of hypertension it causes something called secondary hyperaldosteronism due to the poor blood flow to the kidney stimulation of a certain chemical pathway. This is not in itself a true disease. SUBJECTIVE:  Review of Systems   Constitutional: Negative for chills, diaphoresis, fever and malaise/fatigue. HENT: Negative. Eyes: Negative. Cardiovascular:  Negative for chest pain, dyspnea on exertion, leg swelling and orthopnea. Respiratory: Negative. Negative for cough, shortness of breath, sputum production and wheezing. Gastrointestinal:  Negative for abdominal pain, diarrhea, nausea and vomiting. Genitourinary:  Negative for dysuria, flank pain, hematuria and incomplete emptying. Neurological:  Negative for dizziness, focal weakness, headaches and weakness. Psychiatric/Behavioral:  Negative for altered mental status, hallucinations and hypervigilance. The patient is not nervous/anxious. OBJECTIVE:  Current Weight:    [email or web address]:     Blood pressure 148/80, pulse 70, height 5' 4" (1.626 m). , Body mass index is 27.12 kg/m². [unfilled]    Physical Exam: /80 (BP Location: Left arm, Patient Position: Sitting, Cuff Size: Standard)   Pulse 70   Ht 5' 4" (1.626 m)   BMI 27.12 kg/m²   Physical Exam  Constitutional:       General: She is not in acute distress. Appearance: She is not toxic-appearing or diaphoretic. HENT:      Head: Normocephalic and atraumatic. Nose: Nose normal.      Mouth/Throat:      Mouth: Mucous membranes are moist.   Eyes:      General: No scleral icterus. Extraocular Movements: Extraocular movements intact.    Cardiovascular:      Rate and Rhythm: Normal rate and regular rhythm. Heart sounds: No friction rub. No gallop. Comments: No pitting edema. Pulmonary:      Effort: Pulmonary effort is normal. No respiratory distress. Breath sounds: No wheezing, rhonchi or rales. Abdominal:      General: Bowel sounds are normal. There is no distension. Palpations: Abdomen is soft. Tenderness: There is no abdominal tenderness. There is no rebound. Musculoskeletal:      Cervical back: Normal range of motion and neck supple. Neurological:      General: No focal deficit present. Mental Status: She is alert and oriented to person, place, and time. Mental status is at baseline. Psychiatric:         Mood and Affect: Mood normal.         Behavior: Behavior normal.         Thought Content: Thought content normal.         Judgment: Judgment normal.         Medications:    Current Outpatient Medications:     Albuterol Sulfate 108 (90 Base) MCG/ACT AEPB, Inhale 2 puffs every 4 (four) hours, Disp: , Rfl:     aspirin 81 mg chewable tablet, 1 every other day, Disp: , Rfl:     beclomethasone (QVAR) 40 MCG/ACT inhaler, Inhale 2 puffs, Disp: , Rfl:     brinzolamide (AZOPT) 1 % ophthalmic suspension, , Disp: , Rfl:     Dicyclomine HCl (BENTYL PO), Take by mouth if needed, Disp: , Rfl:     fexofenadine (ALLEGRA) 180 MG tablet, , Disp: , Rfl:     losartan (COZAAR) 25 mg tablet, Take 1 tablet (25 mg total) by mouth 2 (two) times a day (Patient taking differently: Take 50 mg by mouth 2 (two) times a day), Disp: 180 tablet, Rfl: 3    LUMIGAN 0.01 % ophthalmic drops, Administer 0.001 drops to both eyes 2 (two) times a day, Disp: , Rfl: 11    mometasone (NASONEX) 50 mcg/act nasal spray, SPRAY 2 SPRAYS INTO EACH NOSTRIL EVERY DAY, Disp: , Rfl:     multivitamin (THERAGRAN) TABS, Take 1 tablet by mouth daily, Disp: , Rfl:     Olopatadine HCl 0.6 % SOLN, 2 SPRAYS BY EACH NARE ROUTE 2 (TWO) TIMES A DAY AS NEEDED (RHINITIS). , Disp: , Rfl:     Rosuvastatin Calcium 10 MG CPSP, , Disp: , Rfl: Venlafaxine HCl (EFFEXOR PO), Take 75 tablets by mouth once, Disp: , Rfl:     budesonide (ENTOCORT EC) 3 MG capsule, TAKE 3 CAPSULES DAILY.  (Patient not taking: Reported on 11/17/2022), Disp: , Rfl:     chlorhexidine (PERIDEX) 0.12 % solution, chlorhexidine gluconate 0.12 % mouthwash (Patient not taking: No sig reported), Disp: , Rfl:     Cholecalciferol 5000 units capsule, Take 6,000 Units by mouth daily (Patient not taking: Reported on 11/29/2023), Disp: , Rfl:     clarithromycin (BIAXIN XL) 500 MG 24 hr tablet, Take 1,000 mg by mouth daily (Patient not taking: Reported on 5/28/2023), Disp: , Rfl:     clobetasol (TEMOVATE) 0.05 % GEL, clobetasol 0.05 % topical gel (Patient not taking: Reported on 11/17/2022), Disp: , Rfl:     estradiol (ESTRACE) 0.1 mg/g vaginal cream, Insert 3.32 application into the vagina once as needed (Patient not taking: Reported on 11/17/2022), Disp: , Rfl:     methylPREDNISolone 4 MG tablet therapy pack, Use as directed on package, Disp: 1 each, Rfl: 0    Omega-3 Fatty Acids (FISH OIL) 1,000 mg, Take 1,000 mg by mouth daily, Disp: , Rfl:     venlafaxine (EFFEXOR-XR) 37.5 mg 24 hr capsule, Take 37.5 mg by mouth once, Disp: , Rfl:     Laboratory Results:  Lab Results   Component Value Date    WBC 4.71 11/02/2023    HGB 14.3 11/02/2023    HCT 42.6 11/02/2023    MCV 95 11/02/2023     11/02/2023     Lab Results   Component Value Date    SODIUM 142 11/02/2023    K 4.3 11/02/2023     11/02/2023    CO2 27 11/02/2023    BUN 17 11/02/2023    CREATININE 0.58 (L) 11/02/2023    GLUC 86 11/01/2022    CALCIUM 9.2 11/02/2023     Lab Results   Component Value Date    CALCIUM 9.2 11/02/2023    PHOS 3.7 11/02/2023     No results found for: "LABPROT"

## 2023-11-29 NOTE — LETTER
November 29, 2023     Anand Webster MD  [address] [facility],[address]    Patient: Imelda Cobb   YOB: 1950   Date of Visit: 11/29/2023       Dear Dr. Aimee Lynch: Thank you for referring Shiela Lacey to me for evaluation. Below are my notes for this consultation. If you have questions, please do not hesitate to call me. I look forward to following your patient along with you. Sincerely,        Richard Gillespie MD        CC: No Recipients    Richard Gillespie MD  11/29/2023 11:30 AM  Sign when Signing Visit  [address] 75 y.o. female MRN: [id number]  Unit/Bed#:  Encounter: [id number]  Reason for Consult: Hypertension with fibromuscular dysplasia    This is the first visit I had with the patient she was very pleasant and informative and looks great. She has a history of hypertension and was diagnosed with fibromuscular dysplasia about 10 years ago when she was being evaluated to potentially donate a kidney to her . She described to me that over the last couple years she has had to increase her losartan dose and she is just here for routine follow-up with no acute complaints. ASSESSMENT/PLAN:  1. Hypertension    The patient potentially has secondary hypertension due to fibromuscular dysplasia which is a form of renal artery stenosis. She informed me that this was done and diagnosed as above and that she actually had an attempted procedure with interventional radiology and was told that there was no ability to intervene and fix the problem because there were beads or aneurysms down the whole length of the artery. She does not remember even having bad high blood pressure at the time. Since then she has been placed on losartan blood pressure is borderline and she is on 100 mg a day. Renal function is normal as are electrolytes.     I am going to obtain the results of the arteriogram just so I can see how extensive the FMD was. If there is no plan to intervene based on the anatomy and there is not necessarily need to keep doing renal Dopplers although it was stable in January of this year with a right renal stenosis of 60-99% in the mid artery. I told her that we can add medications if her blood pressure is not well-controlled but am first going to obtain the results of the testing above and then we will contact the patient to determine if that is needed and when follow-up will be necessary. I have spent a total time of 31 minutes on 11/29/23 in caring for this patient including Diagnostic results, Impressions, Reviewing / ordering tests, medicine, procedures  , and Obtaining or reviewing history  . The patient did ask me about the adrenal gland issue and I explained to her that if fibromuscular dysplasia as a cause of hypertension it causes something called secondary hyperaldosteronism due to the poor blood flow to the kidney stimulation of a certain chemical pathway. This is not in itself a true disease. SUBJECTIVE:  Review of Systems   Constitutional: Negative for chills, diaphoresis, fever and malaise/fatigue. HENT: Negative. Eyes: Negative. Cardiovascular:  Negative for chest pain, dyspnea on exertion, leg swelling and orthopnea. Respiratory: Negative. Negative for cough, shortness of breath, sputum production and wheezing. Gastrointestinal:  Negative for abdominal pain, diarrhea, nausea and vomiting. Genitourinary:  Negative for dysuria, flank pain, hematuria and incomplete emptying. Neurological:  Negative for dizziness, focal weakness, headaches and weakness. Psychiatric/Behavioral:  Negative for altered mental status, hallucinations and hypervigilance. The patient is not nervous/anxious. OBJECTIVE:  Current Weight:    [email or web address]:     Blood pressure 148/80, pulse 70, height 5' 4" (1.626 m). , Body mass index is 27.12 kg/m².     [unfilled]    Physical Exam: BP 148/80 (BP Location: Left arm, Patient Position: Sitting, Cuff Size: Standard)   Pulse 70   Ht 5' 4" (1.626 m)   BMI 27.12 kg/m²   Physical Exam  Constitutional:       General: She is not in acute distress. Appearance: She is not toxic-appearing or diaphoretic. HENT:      Head: Normocephalic and atraumatic. Nose: Nose normal.      Mouth/Throat:      Mouth: Mucous membranes are moist.   Eyes:      General: No scleral icterus. Extraocular Movements: Extraocular movements intact. Cardiovascular:      Rate and Rhythm: Normal rate and regular rhythm. Heart sounds: No friction rub. No gallop. Comments: No pitting edema. Pulmonary:      Effort: Pulmonary effort is normal. No respiratory distress. Breath sounds: No wheezing, rhonchi or rales. Abdominal:      General: Bowel sounds are normal. There is no distension. Palpations: Abdomen is soft. Tenderness: There is no abdominal tenderness. There is no rebound. Musculoskeletal:      Cervical back: Normal range of motion and neck supple. Neurological:      General: No focal deficit present. Mental Status: She is alert and oriented to person, place, and time. Mental status is at baseline. Psychiatric:         Mood and Affect: Mood normal.         Behavior: Behavior normal.         Thought Content:  Thought content normal.         Judgment: Judgment normal.         Medications:    Current Outpatient Medications:   •  Albuterol Sulfate 108 (90 Base) MCG/ACT AEPB, Inhale 2 puffs every 4 (four) hours, Disp: , Rfl:   •  aspirin 81 mg chewable tablet, 1 every other day, Disp: , Rfl:   •  beclomethasone (QVAR) 40 MCG/ACT inhaler, Inhale 2 puffs, Disp: , Rfl:   •  brinzolamide (AZOPT) 1 % ophthalmic suspension, , Disp: , Rfl:   •  Dicyclomine HCl (BENTYL PO), Take by mouth if needed, Disp: , Rfl:   •  fexofenadine (ALLEGRA) 180 MG tablet, , Disp: , Rfl:   •  losartan (COZAAR) 25 mg tablet, Take 1 tablet (25 mg total) by mouth 2 (two) times a day (Patient taking differently: Take 50 mg by mouth 2 (two) times a day), Disp: 180 tablet, Rfl: 3  •  LUMIGAN 0.01 % ophthalmic drops, Administer 0.001 drops to both eyes 2 (two) times a day, Disp: , Rfl: 11  •  mometasone (NASONEX) 50 mcg/act nasal spray, SPRAY 2 SPRAYS INTO EACH NOSTRIL EVERY DAY, Disp: , Rfl:   •  multivitamin (THERAGRAN) TABS, Take 1 tablet by mouth daily, Disp: , Rfl:   •  Olopatadine HCl 0.6 % SOLN, 2 SPRAYS BY EACH NARE ROUTE 2 (TWO) TIMES A DAY AS NEEDED (RHINITIS). , Disp: , Rfl:   •  Rosuvastatin Calcium 10 MG CPSP, , Disp: , Rfl:   •  Venlafaxine HCl (EFFEXOR PO), Take 75 tablets by mouth once, Disp: , Rfl:   •  budesonide (ENTOCORT EC) 3 MG capsule, TAKE 3 CAPSULES DAILY.  (Patient not taking: Reported on 11/17/2022), Disp: , Rfl:   •  chlorhexidine (PERIDEX) 0.12 % solution, chlorhexidine gluconate 0.12 % mouthwash (Patient not taking: No sig reported), Disp: , Rfl:   •  Cholecalciferol 5000 units capsule, Take 6,000 Units by mouth daily (Patient not taking: Reported on 11/29/2023), Disp: , Rfl:   •  clarithromycin (BIAXIN XL) 500 MG 24 hr tablet, Take 1,000 mg by mouth daily (Patient not taking: Reported on 5/28/2023), Disp: , Rfl:   •  clobetasol (TEMOVATE) 0.05 % GEL, clobetasol 0.05 % topical gel (Patient not taking: Reported on 11/17/2022), Disp: , Rfl:   •  estradiol (ESTRACE) 0.1 mg/g vaginal cream, Insert 3.66 application into the vagina once as needed (Patient not taking: Reported on 11/17/2022), Disp: , Rfl:   •  methylPREDNISolone 4 MG tablet therapy pack, Use as directed on package, Disp: 1 each, Rfl: 0  •  Omega-3 Fatty Acids (FISH OIL) 1,000 mg, Take 1,000 mg by mouth daily, Disp: , Rfl:   •  venlafaxine (EFFEXOR-XR) 37.5 mg 24 hr capsule, Take 37.5 mg by mouth once, Disp: , Rfl:     Laboratory Results:  Lab Results   Component Value Date    WBC 4.71 11/02/2023    HGB 14.3 11/02/2023    HCT 42.6 11/02/2023    MCV 95 11/02/2023     11/02/2023 Lab Results   Component Value Date    SODIUM 142 11/02/2023    K 4.3 11/02/2023     11/02/2023    CO2 27 11/02/2023    BUN 17 11/02/2023    CREATININE 0.58 (L) 11/02/2023    GLUC 86 11/01/2022    CALCIUM 9.2 11/02/2023     Lab Results   Component Value Date    CALCIUM 9.2 11/02/2023    PHOS 3.7 11/02/2023     No results found for: "LABPROT"

## 2023-11-29 NOTE — PATIENT INSTRUCTIONS
You are here for your first visit with me it was a pleasure meeting you. You were very helpful in providing the history but as I told you I wanted do a little homework and get the results of your prior arteriogram to look at your kidney artery anatomy that was done many years ago. I do want to let you know your kidney functions normal that creatinine 0.5 so they are working super normal.    Blood pressure was acceptable although borderline today and you say that it is this is a little high for you reviewing your medicines you are only on losartan so if we require more to get the blood pressure down I will help with that. For now just continue current medications I am going to look into the chart from the past I will call you with those results we will be tracking your blood pressure to let me know how it is and if we need to introduce more medication we will do it at that time. Please call me if you have any questions before you hear from me.

## 2023-12-12 ENCOUNTER — TELEPHONE (OUTPATIENT)
Dept: NEPHROLOGY | Facility: CLINIC | Age: 73
End: 2023-12-12

## 2023-12-12 NOTE — TELEPHONE ENCOUNTER
Called patient and left a voicemail stating the following information: We could not get me the records at [facility] as they said if over 10 years not there in the system. That is what they told us. Ask how BP is and if up is she ready to try to add another med. If good will watch. I asked the patient please call back with further questions.

## 2023-12-12 NOTE — TELEPHONE ENCOUNTER
----- Message from Becky Mark MD sent at 12/9/2023  8:47 AM EST -----  Please call her and they could not get me the records at [facility] as they said if over 10 years not there in the system. That is what they told us. Ask how BP is and if up is she ready to try to add another med. If good will watch. Let me know.  ----- Message -----  From: Smita Sawant  Sent: 12/1/2023   8:24 AM EST  To: Becky Mark MD    Spoke with [facility] and the [facility] records department, the cut off is 10 years for them to keep any records. So if she had the CT any time before December of 2013 they do not have it. They looked up everything they could and di not find any records. ----- Message -----  From: Becky Mark MD  Sent: 11/29/2023  11:26 AM EST  To: [facility]    Please call [facility] radiology department and tell them that the patient had a procedure with an arteriogram or potentially a CT arteriogram about 10 years ago and the patient knows that she had it done because she remembers the exact time. Let me know if you can get those results.

## 2023-12-12 NOTE — TELEPHONE ENCOUNTER
----- Message from Leandro Peterson MD sent at 12/9/2023  8:47 AM EST -----  Please call her and they could not get me the records at [facility] as they said if over 10 years not there in the system. That is what they told us. Ask how BP is and if up is she ready to try to add another med. If good will watch. Let me know.  ----- Message -----  From: Nori Lomas  Sent: 12/1/2023   8:24 AM EST  To: Leandro Peterson MD    Spoke with [facility] and the [facility] records department, the cut off is 10 years for them to keep any records. So if she had the CT any time before December of 2013 they do not have it. They looked up everything they could and di not find any records. ----- Message -----  From: Leandro Peterson MD  Sent: 11/29/2023  11:26 AM EST  To: [facility]    Please call [facility] radiology department and tell them that the patient had a procedure with an arteriogram or potentially a CT arteriogram about 10 years ago and the patient knows that she had it done because she remembers the exact time. Let me know if you can get those results.

## 2024-01-12 ENCOUNTER — FOLLOW UP (OUTPATIENT)
Dept: URBAN - METROPOLITAN AREA CLINIC 6 | Facility: CLINIC | Age: 74
End: 2024-01-12

## 2024-01-12 DIAGNOSIS — D31.32: ICD-10-CM

## 2024-01-12 DIAGNOSIS — H40.1131: ICD-10-CM

## 2024-01-12 DIAGNOSIS — H25.813: ICD-10-CM

## 2024-01-12 PROCEDURE — 92012 INTRM OPH EXAM EST PATIENT: CPT

## 2024-01-12 PROCEDURE — 92250 FUNDUS PHOTOGRAPHY W/I&R: CPT

## 2024-01-12 PROCEDURE — 92083 EXTENDED VISUAL FIELD XM: CPT

## 2024-01-12 ASSESSMENT — TONOMETRY
OS_IOP_MMHG: 18
OD_IOP_MMHG: 20

## 2024-01-12 ASSESSMENT — VISUAL ACUITY
OS_CC: 20/25-2
OD_CC: 20/25-2

## 2024-01-24 ENCOUNTER — TELEPHONE (OUTPATIENT)
Dept: DERMATOLOGY | Facility: CLINIC | Age: 74
End: 2024-01-24

## 2024-01-24 NOTE — TELEPHONE ENCOUNTER
Called patient to see if she would be okay moving to a parallel clinic one of our AP's kade Harrell or Aga at  she will still be able to see Dr. Roman.     TEAM: if patient calls back please offer her a time on an AP's clinic on 2/22 as she falls under the parameters that they can see. Remind patient she will still be able to see Dr. Roman she will just be booked under an AP.

## 2024-02-21 PROBLEM — J01.90 ACUTE SINUSITIS: Status: RESOLVED | Noted: 2019-06-30 | Resolved: 2024-02-21

## 2024-02-22 ENCOUNTER — OFFICE VISIT (OUTPATIENT)
Dept: DERMATOLOGY | Facility: CLINIC | Age: 74
End: 2024-02-22

## 2024-02-22 VITALS — BODY MASS INDEX: 26.59 KG/M2 | TEMPERATURE: 99 F | WEIGHT: 154.9 LBS

## 2024-02-22 DIAGNOSIS — Z12.83 SKIN EXAM, SCREENING FOR CANCER: ICD-10-CM

## 2024-02-22 DIAGNOSIS — D22.70 MULTIPLE BENIGN MELANOCYTIC NEVI OF UPPER EXTREMITY, LOWER EXTREMITY, AND TRUNK: Primary | ICD-10-CM

## 2024-02-22 DIAGNOSIS — D22.60 MULTIPLE BENIGN MELANOCYTIC NEVI OF UPPER EXTREMITY, LOWER EXTREMITY, AND TRUNK: Primary | ICD-10-CM

## 2024-02-22 DIAGNOSIS — D22.5 MULTIPLE BENIGN MELANOCYTIC NEVI OF UPPER EXTREMITY, LOWER EXTREMITY, AND TRUNK: Primary | ICD-10-CM

## 2024-02-22 NOTE — PROGRESS NOTES
"[facility] Note     Patient Name: Jaye Jasso  Encounter Date: February 22 2024     Have you been cared for by a [facility] Dermatologist in the last 3 years and, if so, which description applies to you?    Yes.  I have been here within the last 3 years, and my medical history has NOT changed since that time.  I am FEMALE/of child-bearing potential.    REVIEW OF SYSTEMS:  Have you recently had or currently have any of the following? No changes in my recent health.   PAST MEDICAL HISTORY:  Have you personally ever had or currently have any of the following?  If \"YES,\" then please provide more detail. No changes in my medical history.   HISTORY OF IMMUNOSUPPRESSION: Do you have a history of any of the following:  Systemic Immunosuppression such as Diabetes, Biologic or Immunotherapy, Chemotherapy, Organ Transplantation, Bone Marrow Transplantation?  NO     Answering \"YES\" requires the addition of the dotphrase \"IMMUNOSUPPRESSED\" as the first diagnosis of the patient's visit.   FAMILY HISTORY:  Any \"first degree relatives\" (parent, brother, sister, or child) with the following?    No changes in my family's known health.  Son hx of BCC   PATIENT EXPERIENCE:    Do you want the Dermatologist to perform a COMPLETE skin exam today including a clinical examination under the \"bra and underwear\" areas?  Yes  If necessary, do we have your permission to call and leave a detailed message on your Preferred Phone number that includes your specific medical information?  Yes      Allergies   Allergen Reactions    Misc. Sulfonamide Containing Compounds Anaphylaxis    Sulfa Antibiotics Anaphylaxis and Other (See Comments)     rash    Other      Annotation - 26Jan2016: many inhalants, carries epi pen    Nabumetone Rash and Angioedema      Current Outpatient Medications:     Albuterol Sulfate 108 (90 Base) MCG/ACT AEPB, Inhale 2 puffs every 4 (four) hours, Disp: , Rfl:     aspirin 81 mg chewable tablet, 1 " every other day, Disp: , Rfl:     beclomethasone (QVAR) 40 MCG/ACT inhaler, Inhale 2 puffs, Disp: , Rfl:     brinzolamide (AZOPT) 1 % ophthalmic suspension, , Disp: , Rfl:     budesonide (ENTOCORT EC) 3 MG capsule, TAKE 3 CAPSULES DAILY. (Patient not taking: Reported on 11/17/2022), Disp: , Rfl:     chlorhexidine (PERIDEX) 0.12 % solution, chlorhexidine gluconate 0.12 % mouthwash (Patient not taking: No sig reported), Disp: , Rfl:     Cholecalciferol 5000 units capsule, Take 6,000 Units by mouth daily (Patient not taking: Reported on 11/29/2023), Disp: , Rfl:     clarithromycin (BIAXIN XL) 500 MG 24 hr tablet, Take 1,000 mg by mouth daily (Patient not taking: Reported on 5/28/2023), Disp: , Rfl:     clobetasol (TEMOVATE) 0.05 % GEL, clobetasol 0.05 % topical gel (Patient not taking: Reported on 11/17/2022), Disp: , Rfl:     Dicyclomine HCl (BENTYL PO), Take by mouth if needed, Disp: , Rfl:     estradiol (ESTRACE) 0.1 mg/g vaginal cream, Insert 0.01 application into the vagina once as needed (Patient not taking: Reported on 11/17/2022), Disp: , Rfl:     fexofenadine (ALLEGRA) 180 MG tablet, , Disp: , Rfl:     losartan (COZAAR) 25 mg tablet, Take 1 tablet (25 mg total) by mouth 2 (two) times a day (Patient taking differently: Take 50 mg by mouth 2 (two) times a day), Disp: 180 tablet, Rfl: 3    LUMIGAN 0.01 % ophthalmic drops, Administer 0.001 drops to both eyes 2 (two) times a day, Disp: , Rfl: 11    methylPREDNISolone 4 MG tablet therapy pack, Use as directed on package, Disp: 1 each, Rfl: 0    mometasone (NASONEX) 50 mcg/act nasal spray, SPRAY 2 SPRAYS INTO EACH NOSTRIL EVERY DAY, Disp: , Rfl:     multivitamin (THERAGRAN) TABS, Take 1 tablet by mouth daily, Disp: , Rfl:     Olopatadine HCl 0.6 % SOLN, 2 SPRAYS BY EACH NARE ROUTE 2 (TWO) TIMES A DAY AS NEEDED (RHINITIS)., Disp: , Rfl:     Omega-3 Fatty Acids (FISH OIL) 1,000 mg, Take 1,000 mg by mouth daily, Disp: , Rfl:     Rosuvastatin Calcium 10 MG CPSP, , Disp:  , Rfl:     venlafaxine (EFFEXOR-XR) 37.5 mg 24 hr capsule, Take 37.5 mg by mouth once, Disp: , Rfl:     Venlafaxine HCl (EFFEXOR PO), Take 75 tablets by mouth once, Disp: , Rfl:           Whom besides the patient is providing clinical information about today's encounter?   NO ADDITIONAL HISTORIAN (patient alone provided history)    Physical Exam and Assessment/Plan by Diagnosis:    SEBORRHEIC KERATOSES  - Relevant exam: Scattered over the trunk/extremities are waxy brown to black plaques and papules with stuck on appearance  - Exam and clinical history consistent with seborrheic keratoses  - Counseled that these are benign growths that do not require treatment  - Counseled that removal of lesions is considered cosmetic and so would incur a fee should patient elect to move forward.   - Patient to hold on treatments for now but will inform us should they desire additional treatments    MELANOCYTIC NEVI  -Relevant exam: Scattered over the trunk/extremities are homogenously pigmented brown macules and papules. ELM performed and without concerning findings.  - Exam and clinical history consistent with melanocytic nevi  - Educated on the ABCDE's of melanoma; handout provided  - Counseled to return to clinic prior to scheduled appointment should any of these lesions change or should any new lesions of concern arise  - Counseled on use of sun protection daily. Reviewed latest FDA sunscreen guidelines, including use of broad spectrum (UVA and UVB blocking) sunscreen or sun protective clothing with SPF 30-50 every 2-3 hours and reapplied after exposure to water; use of photoprotective clothing, including a broad brim hat and UPF rated clothing if outdoors for several hours; avoid use of tanning beds as these pose significant risk for melanoma and skin cancer.    LENTIGINES  OTHER SKIN CHANGES DUE TO CHRONIC EXPOSURE TO NONIONIZING RADIATION  - Relevant exam: Over sun exposed areas are brown macules. ELM performed and without  concerning findings.  - Exam and clinical history consistent with lentigines.  - Educated that these are indicative of prior sun exposure.   - Counseled to return to clinic prior to scheduled appointment should any of these lesions change or should any new lesions of concern arise.  - Recommended use of sunscreen as above and below.  - Counseled on use of sun protection daily. Reviewed latest FDA sunscreen guidelines, including use of broad spectrum (UVA and UVB blocking) sunscreen or sun protective clothing with SPF 30-50 every 2-3 hours and reapplied after exposure to water; use of photoprotective clothing, including a broad brim hat and UPF rated clothing if outdoors for several hours; avoid use of tanning beds as these pose significant risk for melanoma and skin cancer.    CHERRY ANGIOMAS  - Relevant exam: Scattered over the trunk/extremities are red papules  - Exam and clinical history consistent with cherry angiomas  - Educated that these are benign  - Educated that removal is considered aesthetic and would incur a fee.  - Patient does not wish to pursue removal at this time but will contact us should this change.      VERUCCOUS KERATOSIS    Physical Exam:  Anatomic Location: Right temple  Morphologic Description:  verrucous papule    Additional History of Present Condition:  Present on exam    Plan:  CRYOTHERAPY.  Patient/family opts to treat the warts with liquid nitrogen.  Usually this is done at 2- to 4-week intervals.  It destroys the outer layer of skin and causes peeling.  It is uncomfortable and may result in blistering for several days or longer.  It may also result in skin color changes (lightening or darkening of the skin) and even numbness if performed over a superficial nerve such as the side of a finger.  It may also result in permanent nail injury/dystrophy if the nail matrix is damaged during freezing.  Success rates are around 70% after 3 to 4 months of regular freezing sessions.  SEE  PROCEDURE NOTE BELOW.       PROCEDURES PERFORMED TODAY ASSOCIATED WITH THIS CONDITION:            PROCEDURE:  DESTRUCTION OF BENIGN LESIONS WITH CRYOTHERAPY  After a thorough discussion of treatment options and risk/benefits/alternatives (including but not limited to local pain, scarring, dyspigmentation, blistering, and possible superinfection), verbal and written consent were obtained and the aforementioned lesions were treated on with cryotherapy using liquid nitrogen x 1 cycle for 5-10 seconds.    TOTAL NUMBER of 1 lesions were treated today on the ANATOMIC LOCATION: right temple.    The patient tolerated the procedure well, and after-care instructions were provided.         Medical Complexity:    SELF-LIMITED OR MINOR PROBLEM.  Problem runs a definite and prescribed course, is transient in nature, and is not likely to permanently alter health status.     Scribe Attestation      I,:  Nelly Azevedo am acting as a scribe while in the presence of the attending physician.:       I,:  Aga Pierson PA-C personally performed the services described in this documentation    as scribed in my presence.:

## 2024-02-22 NOTE — PATIENT INSTRUCTIONS
Physical Exam and Assessment/Plan by Diagnosis:    SEBORRHEIC KERATOSES  - Relevant exam: Scattered over the trunk/extremities are waxy brown to black plaques and papules with stuck on appearance  - Exam and clinical history consistent with seborrheic keratoses  - Counseled that these are benign growths that do not require treatment  - Counseled that removal of lesions is considered cosmetic and so would incur a fee should patient elect to move forward.   - Patient to hold on treatments for now but will inform us should they desire additional treatments    MELANOCYTIC NEVI  -Relevant exam: Scattered over the trunk/extremities are homogenously pigmented brown macules and papules. ELM performed and without concerning findings.  - Exam and clinical history consistent with melanocytic nevi  - Educated on the ABCDE's of melanoma; handout provided  - Counseled to return to clinic prior to scheduled appointment should any of these lesions change or should any new lesions of concern arise  - Counseled on use of sun protection daily. Reviewed latest FDA sunscreen guidelines, including use of broad spectrum (UVA and UVB blocking) sunscreen or sun protective clothing with SPF 30-50 every 2-3 hours and reapplied after exposure to water; use of photoprotective clothing, including a broad brim hat and UPF rated clothing if outdoors for several hours; avoid use of tanning beds as these pose significant risk for melanoma and skin cancer.    LENTIGINES  OTHER SKIN CHANGES DUE TO CHRONIC EXPOSURE TO NONIONIZING RADIATION  - Relevant exam: Over sun exposed areas are brown macules. ELM performed and without concerning findings.  - Exam and clinical history consistent with lentigines.  - Educated that these are indicative of prior sun exposure.   - Counseled to return to clinic prior to scheduled appointment should any of these lesions change or should any new lesions of concern arise.  - Recommended use of sunscreen as above and  below.  - Counseled on use of sun protection daily. Reviewed latest FDA sunscreen guidelines, including use of broad spectrum (UVA and UVB blocking) sunscreen or sun protective clothing with SPF 30-50 every 2-3 hours and reapplied after exposure to water; use of photoprotective clothing, including a broad brim hat and UPF rated clothing if outdoors for several hours; avoid use of tanning beds as these pose significant risk for melanoma and skin cancer.    CHERRY ANGIOMAS  - Relevant exam: Scattered over the trunk/extremities are red papules  - Exam and clinical history consistent with cherry angiomas  - Educated that these are benign  - Educated that removal is considered aesthetic and would incur a fee.  - Patient does not wish to pursue removal at this time but will contact us should this change.      VERUCCOUS KERATOSIS    Physical Exam:  Anatomic Location: Right temple  Morphologic Description:  verrucous papule    Additional History of Present Condition:  Present on exam    Plan:  CRYOTHERAPY.  Patient/family opts to treat the warts with liquid nitrogen.  Usually this is done at 2- to 4-week intervals.  It destroys the outer layer of skin and causes peeling.  It is uncomfortable and may result in blistering for several days or longer.  It may also result in skin color changes (lightening or darkening of the skin) and even numbness if performed over a superficial nerve such as the side of a finger.  It may also result in permanent nail injury/dystrophy if the nail matrix is damaged during freezing.  Success rates are around 70% after 3 to 4 months of regular freezing sessions.  SEE PROCEDURE NOTE BELOW.       PROCEDURES PERFORMED TODAY ASSOCIATED WITH THIS CONDITION:            PROCEDURE:  DESTRUCTION OF BENIGN LESIONS WITH CRYOTHERAPY  After a thorough discussion of treatment options and risk/benefits/alternatives (including but not limited to local pain, scarring, dyspigmentation, blistering, and possible  superinfection), verbal and written consent were obtained and the aforementioned lesions were treated on with cryotherapy using liquid nitrogen x 1 cycle for 5-10 seconds.    TOTAL NUMBER of 1 lesions were treated today on the ANATOMIC LOCATION: right temple.    The patient tolerated the procedure well, and after-care instructions were provided.         Medical Complexity:    SELF-LIMITED OR MINOR PROBLEM.  Problem runs a definite and prescribed course, is transient in nature, and is not likely to permanently alter health status.

## 2024-02-23 ENCOUNTER — RX CHECK (OUTPATIENT)
Dept: URBAN - METROPOLITAN AREA CLINIC 6 | Facility: CLINIC | Age: 74
End: 2024-02-23

## 2024-02-23 DIAGNOSIS — H52.03: ICD-10-CM

## 2024-02-23 DIAGNOSIS — H52.203: ICD-10-CM

## 2024-02-23 DIAGNOSIS — H52.4: ICD-10-CM

## 2024-02-23 PROCEDURE — 92015 DETERMINE REFRACTIVE STATE: CPT | Mod: NC

## 2024-04-10 ENCOUNTER — OFFICE VISIT (OUTPATIENT)
Dept: URGENT CARE | Age: 74
End: 2024-04-10
Payer: MEDICARE

## 2024-04-10 VITALS
HEART RATE: 64 BPM | OXYGEN SATURATION: 97 % | RESPIRATION RATE: 18 BRPM | SYSTOLIC BLOOD PRESSURE: 126 MMHG | TEMPERATURE: 97.5 F | DIASTOLIC BLOOD PRESSURE: 65 MMHG

## 2024-04-10 DIAGNOSIS — H10.32 ACUTE BACTERIAL CONJUNCTIVITIS OF LEFT EYE: Primary | ICD-10-CM

## 2024-04-10 PROCEDURE — G0463 HOSPITAL OUTPT CLINIC VISIT: HCPCS

## 2024-04-10 PROCEDURE — 99213 OFFICE O/P EST LOW 20 MIN: CPT

## 2024-04-10 RX ORDER — OFLOXACIN 3 MG/ML
1 SOLUTION/ DROPS OPHTHALMIC 4 TIMES DAILY
Qty: 5 ML | Refills: 0 | Status: SHIPPED | OUTPATIENT
Start: 2024-04-10

## 2024-04-10 NOTE — PROGRESS NOTES
[facility]        NAME: Jaye Jasso is a 73 y.o. female  : 1950    MRN: [id number]  DATE: April 10, 2024  TIME: 9:47 AM    Assessment and Plan   Acute bacterial conjunctivitis of left eye [H10.32]  1. Acute bacterial conjunctivitis of left eye  ofloxacin (OCUFLOX) 0.3 % ophthalmic solution            Patient Instructions     Please use antibiotic eye drops 4x daily for the next 5 days.   Wipe eye from inside to outside.   Keep all high touch points clean.   Ensure that you are washing your hands regularly.     Follow up with PCP in 3-5 days.  Proceed to  ER if symptoms worsen.    If tests are performed, our office will contact you with results only if changes need to made to the care plan discussed with you at the visit. You can review your full results on [facility].    Chief Complaint     Chief Complaint   Patient presents with    Eye Pain     Left eye feels like sand and some discharge since color yellow since Monday.         History of Present Illness       73-year-old female presenting for evaluation of possible conjunctivitis.  Patient states that she babysat her grandchild over the weekend who was diagnosed with conjunctivitis.  She states that over the past few days she has been having crusting and yellow discharge from her left eye as well as redness and itching.  She denies any eye pain or visual disturbances, but states that it feels as if there is sand in her left eye.  Patient denies any fevers or chills.  She has been using artificial tears with minimal relief of her symptoms.    Eye Pain   Associated symptoms include an eye discharge, eye redness and itching. Pertinent negatives include no fever or photophobia.       Review of Systems   Review of Systems   Constitutional:  Negative for chills and fever.   Eyes:  Positive for discharge, redness and itching. Negative for photophobia, pain and visual disturbance.   All other systems reviewed and are  negative.        Current Medications       Current Outpatient Medications:     ofloxacin (OCUFLOX) 0.3 % ophthalmic solution, Administer 1 drop into the left eye 4 (four) times a day, Disp: 5 mL, Rfl: 0    Albuterol Sulfate 108 (90 Base) MCG/ACT AEPB, Inhale 2 puffs every 4 (four) hours, Disp: , Rfl:     aspirin 81 mg chewable tablet, 1 every other day, Disp: , Rfl:     beclomethasone (QVAR) 40 MCG/ACT inhaler, Inhale 2 puffs, Disp: , Rfl:     brinzolamide (AZOPT) 1 % ophthalmic suspension, , Disp: , Rfl:     Cholecalciferol 5000 units capsule, Take 6,000 Units by mouth daily (Patient not taking: Reported on 11/29/2023), Disp: , Rfl:     Dicyclomine HCl (BENTYL PO), Take by mouth if needed, Disp: , Rfl:     estradiol (ESTRACE) 0.1 mg/g vaginal cream, Insert 0.01 application into the vagina once as needed (Patient not taking: Reported on 11/17/2022), Disp: , Rfl:     fexofenadine (ALLEGRA) 180 MG tablet, , Disp: , Rfl:     losartan (COZAAR) 25 mg tablet, Take 1 tablet (25 mg total) by mouth 2 (two) times a day (Patient taking differently: Take 50 mg by mouth 2 (two) times a day), Disp: 180 tablet, Rfl: 3    LUMIGAN 0.01 % ophthalmic drops, Administer 0.001 drops to both eyes 2 (two) times a day, Disp: , Rfl: 11    mometasone (NASONEX) 50 mcg/act nasal spray, SPRAY 2 SPRAYS INTO EACH NOSTRIL EVERY DAY, Disp: , Rfl:     multivitamin (THERAGRAN) TABS, Take 1 tablet by mouth daily, Disp: , Rfl:     Olopatadine HCl 0.6 % SOLN, 2 SPRAYS BY EACH NARE ROUTE 2 (TWO) TIMES A DAY AS NEEDED (RHINITIS)., Disp: , Rfl:     Rosuvastatin Calcium 10 MG CPSP, , Disp: , Rfl:     venlafaxine (EFFEXOR-XR) 37.5 mg 24 hr capsule, Take 37.5 mg by mouth once, Disp: , Rfl:     Venlafaxine HCl (EFFEXOR PO), Take 75 tablets by mouth once, Disp: , Rfl:     Current Allergies     Allergies as of 04/10/2024 - Reviewed 04/10/2024   Allergen Reaction Noted    Misc. sulfonamide containing compounds Anaphylaxis 12/02/2008    Sulfa antibiotics  Anaphylaxis and Other (See Comments) 03/26/2010    Other  01/26/2016    Nabumetone Rash and Angioedema 04/16/2015            The following portions of the patient's history were reviewed and updated as appropriate: allergies, current medications, past family history, past medical history, past social history, past surgical history and problem list.     Past Medical History:   Diagnosis Date    Asymptomatic spider vein     Collagenous colitis     Disc disorder     Female bladder prolapse     Fibromuscular dysplasia (HCC)     FMD (facioscapulohumeral muscular dystrophy) (HCC)     Hypertension     Incomplete emptying of bladder     Osteoarthritis     Secondary hyperaldosteronism (HCC)        Past Surgical History:   Procedure Laterality Date    HYSTERECTOMY      TUBAL LIGATION         Family History   Problem Relation Age of Onset    Gout Mother     Heart disease Mother     Hypertension Mother     Diabetes Father     Heart disease Father     Hypertension Father     Gout Sister     Heart disease Sister     Hypertension Sister     Gout Brother     Heart disease Brother     Hypertension Brother          Medications have been verified.        Objective   /65   Pulse 64   Temp 97.5 °F (36.4 °C) (Temporal)   Resp 18   SpO2 97%        Physical Exam     Physical Exam  Vitals and nursing note reviewed.   Constitutional:       General: She is not in acute distress.     Appearance: Normal appearance. She is normal weight. She is not ill-appearing, toxic-appearing or diaphoretic.   HENT:      Head: Normocephalic and atraumatic.      Nose: Nose normal.   Eyes:      General:         Right eye: No discharge.         Left eye: Discharge (yellow crusting) present.     Extraocular Movements: Extraocular movements intact.      Pupils: Pupils are equal, round, and reactive to light.      Comments: Conjunctival injection    Cardiovascular:      Rate and Rhythm: Normal rate.      Pulses: Normal pulses.   Pulmonary:      Effort:  Pulmonary effort is normal.   Skin:     General: Skin is warm and dry.   Neurological:      Mental Status: She is alert.   Psychiatric:         Mood and Affect: Mood normal.         Behavior: Behavior normal.

## 2024-04-10 NOTE — PATIENT INSTRUCTIONS
Please use antibiotic eye drops 4x daily for the next 5 days.   Wipe eye from inside to outside.   Keep all high touch points clean.   Ensure that you are washing your hands regularly.

## 2024-06-05 ENCOUNTER — TELEPHONE (OUTPATIENT)
Dept: NEPHROLOGY | Facility: CLINIC | Age: 74
End: 2024-06-05

## 2024-06-05 ENCOUNTER — OFFICE VISIT (OUTPATIENT)
Dept: NEPHROLOGY | Facility: CLINIC | Age: 74
End: 2024-06-05
Payer: MEDICARE

## 2024-06-05 VITALS
BODY MASS INDEX: 26.91 KG/M2 | WEIGHT: 157.6 LBS | HEART RATE: 74 BPM | SYSTOLIC BLOOD PRESSURE: 142 MMHG | DIASTOLIC BLOOD PRESSURE: 80 MMHG | HEIGHT: 64 IN

## 2024-06-05 DIAGNOSIS — I10 ESSENTIAL HYPERTENSION: ICD-10-CM

## 2024-06-05 DIAGNOSIS — I77.3 FIBROMUSCULAR DYSPLASIA (HCC): ICD-10-CM

## 2024-06-05 DIAGNOSIS — I15.0 RENOVASCULAR HYPERTENSION: Primary | ICD-10-CM

## 2024-06-05 PROCEDURE — 99214 OFFICE O/P EST MOD 30 MIN: CPT | Performed by: INTERNAL MEDICINE

## 2024-06-05 RX ORDER — METOPROLOL SUCCINATE 25 MG/1
25 TABLET, EXTENDED RELEASE ORAL DAILY
Qty: 90 TABLET | Refills: 3 | Status: SHIPPED | OUTPATIENT
Start: 2024-06-05

## 2024-06-05 NOTE — TELEPHONE ENCOUNTER
Pt is scheduled today 6/5 with Dr Up in the [facility] at 4:30pm. Called pt and LVM. Asked if she is able to come in a little sooner at 4pm, as he has an opening. Asked her to please call back to let us know.

## 2024-06-05 NOTE — PATIENT INSTRUCTIONS
You are here for follow-up it was very nice to see you and I am glad to hear your health is doing well.  Kidney functions normal and you know to    You have fibromuscular dysplasia and as you recall they told you that the lesions went all the way down the arteries so they did not feel intervening would help.  Your blood pressure has been running a little bit higher over the last year requiring a little more medication.  We discussed potentially looking at the kidney arteries to see if there is anything that changed and I am going to put in a referral to Dr. Toscano who is an interventional radiologist who likely will contact you for consultation to discuss it.    There may not be able to intervene on anything given your past description of it so at this point we are going to add Toprol XL 25 mg a day to your losartan at the same dose to try and get the blood pressure under little better control.    If you have any side effects stop the medication and call me.    I will give you a call in a few weeks and to see how the blood pressure is doing certainly reach out to me if there is any problems before that.

## 2024-06-05 NOTE — PROGRESS NOTES
NEPHROLOGY PROGRESS NOTE    Jaye Jasso 73 y.o. female MRN: [id number]  Unit/Bed#:  Encounter: [id number]  Reason for Consult: Hypertension and fibromuscular dysplasia    The patient is here for follow-up she looks terrific says she is staying busy and has not had any intercurrent illnesses.  She did try amlodipine and developed gum symptoms with bleeding and stopped that medication.  Her blood pressure has been climbing slightly over the last year.    ASSESSMENT/PLAN:  1.  Hypertension    The patient has hypertension and apparently over the last year she has had slowly increasing her dose of losartan to 100 mg daily.  She takes it in divided dosages.  Her blood pressures at home are sometimes significantly elevated other times are acceptable.    She has fibromuscular dysplasia diagnosed long before I met her.  She was told that it involves the length of the artery and intervention was not seems to be likely to help.  I was unable to obtain any of the studies or reports despite trying.  We discussed this and she would like to get an opinion from interventional radiologist as a Doppler suggestive of stenosis proximally.    I am going to start metoprolol XL 25 mg a day  Continue losartan at current dose  Follow-up on blood pressure  Discussed case and consult interventional radiology Dr. Toscano    I have spent a total time of 32 minutes on 06/06/24 in caring for this patient including Diagnostic results, Impressions, Reviewing / ordering tests, medicine, procedures  , and Obtaining or reviewing history  .         SUBJECTIVE:  Review of Systems   Constitutional: Negative for chills, diaphoresis and fever.   HENT: Negative.     Eyes: Negative.    Cardiovascular:  Negative for chest pain, dyspnea on exertion, leg swelling and orthopnea.   Respiratory:  Negative for cough, shortness of breath and sputum production.    Gastrointestinal:  Negative for abdominal pain, diarrhea, nausea and vomiting.    Genitourinary: Negative.    Neurological:  Negative for dizziness, focal weakness, headaches and weakness.   Psychiatric/Behavioral:  Negative for altered mental status, depression, hallucinations and hypervigilance.        OBJECTIVE:  Current Weight:    Vitals:@TMAX(24)@Current:     There were no vitals taken for this visit. , There is no height or weight on file to calculate BMI.    [unfilled]    Physical Exam: There were no vitals taken for this visit.  Physical Exam  Constitutional:       General: She is not in acute distress.     Appearance: She is not toxic-appearing or diaphoretic.   HENT:      Head: Normocephalic and atraumatic.      Nose: Nose normal.      Mouth/Throat:      Mouth: Mucous membranes are moist.   Eyes:      General: No scleral icterus.     Extraocular Movements: Extraocular movements intact.   Cardiovascular:      Rate and Rhythm: Normal rate and regular rhythm.      Heart sounds:      No friction rub. No gallop.   Pulmonary:      Effort: Pulmonary effort is normal. No respiratory distress.      Breath sounds: No wheezing, rhonchi or rales.   Abdominal:      General: Bowel sounds are normal. There is no distension.      Palpations: Abdomen is soft.      Tenderness: There is no abdominal tenderness. There is no rebound.   Musculoskeletal:      Cervical back: Normal range of motion and neck supple.   Neurological:      Mental Status: She is alert and oriented to person, place, and time. Mental status is at baseline.   Psychiatric:         Mood and Affect: Mood normal.         Behavior: Behavior normal.         Medications:    Current Outpatient Medications:     Albuterol Sulfate 108 (90 Base) MCG/ACT AEPB, Inhale 2 puffs every 4 (four) hours, Disp: , Rfl:     aspirin 81 mg chewable tablet, 1 every other day, Disp: , Rfl:     beclomethasone (QVAR) 40 MCG/ACT inhaler, Inhale 2 puffs, Disp: , Rfl:     brinzolamide (AZOPT) 1 % ophthalmic suspension, , Disp: , Rfl:     Cholecalciferol 5000 units  "capsule, Take 6,000 Units by mouth daily (Patient not taking: Reported on 11/29/2023), Disp: , Rfl:     Dicyclomine HCl (BENTYL PO), Take by mouth if needed, Disp: , Rfl:     estradiol (ESTRACE) 0.1 mg/g vaginal cream, Insert 0.01 application into the vagina once as needed (Patient not taking: Reported on 11/17/2022), Disp: , Rfl:     fexofenadine (ALLEGRA) 180 MG tablet, , Disp: , Rfl:     losartan (COZAAR) 25 mg tablet, Take 1 tablet (25 mg total) by mouth 2 (two) times a day (Patient taking differently: Take 50 mg by mouth 2 (two) times a day), Disp: 180 tablet, Rfl: 3    LUMIGAN 0.01 % ophthalmic drops, Administer 0.001 drops to both eyes 2 (two) times a day, Disp: , Rfl: 11    mometasone (NASONEX) 50 mcg/act nasal spray, SPRAY 2 SPRAYS INTO EACH NOSTRIL EVERY DAY, Disp: , Rfl:     multivitamin (THERAGRAN) TABS, Take 1 tablet by mouth daily, Disp: , Rfl:     ofloxacin (OCUFLOX) 0.3 % ophthalmic solution, Administer 1 drop into the left eye 4 (four) times a day, Disp: 5 mL, Rfl: 0    Olopatadine HCl 0.6 % SOLN, 2 SPRAYS BY EACH NARE ROUTE 2 (TWO) TIMES A DAY AS NEEDED (RHINITIS)., Disp: , Rfl:     Rosuvastatin Calcium 10 MG CPSP, , Disp: , Rfl:     venlafaxine (EFFEXOR-XR) 37.5 mg 24 hr capsule, Take 37.5 mg by mouth once, Disp: , Rfl:     Venlafaxine HCl (EFFEXOR PO), Take 75 tablets by mouth once, Disp: , Rfl:     Laboratory Results:  Lab Results   Component Value Date    WBC 4.71 11/02/2023    HGB 14.3 11/02/2023    HCT 42.6 11/02/2023    MCV 95 11/02/2023     11/02/2023     Lab Results   Component Value Date    SODIUM 144 05/06/2024    K 4.4 05/06/2024     05/06/2024    CO2 29 05/06/2024    BUN 17 05/06/2024    CREATININE 0.67 05/06/2024    GLUC 81 05/06/2024    CALCIUM 9.6 05/06/2024     Lab Results   Component Value Date    CALCIUM 9.6 05/06/2024    PHOS 3.7 11/02/2023     No results found for: \"LABPROT\"      "

## 2024-06-05 NOTE — LETTER
June 6, 2024     Feliciano Mccormack Jr., MD  [address] [facility]  [address] [place] [address]    Patient: Jaye Jasso   YOB: 1950   Date of Visit: 6/5/2024       Dear Dr. Mccormack:    Thank you for referring Jaye Jasso to me for evaluation. Below are my notes for this consultation.    If you have questions, please do not hesitate to call me. I look forward to following your patient along with you.         Sincerely,        Raul Up MD        CC: No Recipients    Raul Up MD  6/6/2024  8:19 AM  Sign when Signing Visit  NEPHROLOGY PROGRESS NOTE    Jaye Jasso 73 y.o. female MRN: [id number]  Unit/Bed#:  Encounter: [id number]  Reason for Consult: Hypertension and fibromuscular dysplasia    The patient is here for follow-up she looks terrific says she is staying busy and has not had any intercurrent illnesses.  She did try amlodipine and developed gum symptoms with bleeding and stopped that medication.  Her blood pressure has been climbing slightly over the last year.    ASSESSMENT/PLAN:  1.  Hypertension    The patient has hypertension and apparently over the last year she has had slowly increasing her dose of losartan to 100 mg daily.  She takes it in divided dosages.  Her blood pressures at home are sometimes significantly elevated other times are acceptable.    She has fibromuscular dysplasia diagnosed long before I met her.  She was told that it involves the length of the artery and intervention was not seems to be likely to help.  I was unable to obtain any of the studies or reports despite trying.  We discussed this and she would like to get an opinion from interventional radiologist as a Doppler suggestive of stenosis proximally.    I am going to start metoprolol XL 25 mg a day  Continue losartan at current dose  Follow-up on blood pressure  Discussed case and consult interventional radiology Dr. Toscano    I have spent a total time of 32 minutes on 06/06/24 in caring  for this patient including Diagnostic results, Impressions, Reviewing / ordering tests, medicine, procedures  , and Obtaining or reviewing history  .         SUBJECTIVE:  Review of Systems   Constitutional: Negative for chills, diaphoresis and fever.   HENT: Negative.     Eyes: Negative.    Cardiovascular:  Negative for chest pain, dyspnea on exertion, leg swelling and orthopnea.   Respiratory:  Negative for cough, shortness of breath and sputum production.    Gastrointestinal:  Negative for abdominal pain, diarrhea, nausea and vomiting.   Genitourinary: Negative.    Neurological:  Negative for dizziness, focal weakness, headaches and weakness.   Psychiatric/Behavioral:  Negative for altered mental status, depression, hallucinations and hypervigilance.        OBJECTIVE:  Current Weight:    Vitals:@TMAX(24)@Current:     There were no vitals taken for this visit. , There is no height or weight on file to calculate BMI.    [unfilled]    Physical Exam: There were no vitals taken for this visit.  Physical Exam  Constitutional:       General: She is not in acute distress.     Appearance: She is not toxic-appearing or diaphoretic.   HENT:      Head: Normocephalic and atraumatic.      Nose: Nose normal.      Mouth/Throat:      Mouth: Mucous membranes are moist.   Eyes:      General: No scleral icterus.     Extraocular Movements: Extraocular movements intact.   Cardiovascular:      Rate and Rhythm: Normal rate and regular rhythm.      Heart sounds:      No friction rub. No gallop.   Pulmonary:      Effort: Pulmonary effort is normal. No respiratory distress.      Breath sounds: No wheezing, rhonchi or rales.   Abdominal:      General: Bowel sounds are normal. There is no distension.      Palpations: Abdomen is soft.      Tenderness: There is no abdominal tenderness. There is no rebound.   Musculoskeletal:      Cervical back: Normal range of motion and neck supple.   Neurological:      Mental Status: She is alert and oriented  to person, place, and time. Mental status is at baseline.   Psychiatric:         Mood and Affect: Mood normal.         Behavior: Behavior normal.         Medications:    Current Outpatient Medications:   •  Albuterol Sulfate 108 (90 Base) MCG/ACT AEPB, Inhale 2 puffs every 4 (four) hours, Disp: , Rfl:   •  aspirin 81 mg chewable tablet, 1 every other day, Disp: , Rfl:   •  beclomethasone (QVAR) 40 MCG/ACT inhaler, Inhale 2 puffs, Disp: , Rfl:   •  brinzolamide (AZOPT) 1 % ophthalmic suspension, , Disp: , Rfl:   •  Cholecalciferol 5000 units capsule, Take 6,000 Units by mouth daily (Patient not taking: Reported on 11/29/2023), Disp: , Rfl:   •  Dicyclomine HCl (BENTYL PO), Take by mouth if needed, Disp: , Rfl:   •  estradiol (ESTRACE) 0.1 mg/g vaginal cream, Insert 0.01 application into the vagina once as needed (Patient not taking: Reported on 11/17/2022), Disp: , Rfl:   •  fexofenadine (ALLEGRA) 180 MG tablet, , Disp: , Rfl:   •  losartan (COZAAR) 25 mg tablet, Take 1 tablet (25 mg total) by mouth 2 (two) times a day (Patient taking differently: Take 50 mg by mouth 2 (two) times a day), Disp: 180 tablet, Rfl: 3  •  LUMIGAN 0.01 % ophthalmic drops, Administer 0.001 drops to both eyes 2 (two) times a day, Disp: , Rfl: 11  •  mometasone (NASONEX) 50 mcg/act nasal spray, SPRAY 2 SPRAYS INTO EACH NOSTRIL EVERY DAY, Disp: , Rfl:   •  multivitamin (THERAGRAN) TABS, Take 1 tablet by mouth daily, Disp: , Rfl:   •  ofloxacin (OCUFLOX) 0.3 % ophthalmic solution, Administer 1 drop into the left eye 4 (four) times a day, Disp: 5 mL, Rfl: 0  •  Olopatadine HCl 0.6 % SOLN, 2 SPRAYS BY EACH NARE ROUTE 2 (TWO) TIMES A DAY AS NEEDED (RHINITIS)., Disp: , Rfl:   •  Rosuvastatin Calcium 10 MG CPSP, , Disp: , Rfl:   •  venlafaxine (EFFEXOR-XR) 37.5 mg 24 hr capsule, Take 37.5 mg by mouth once, Disp: , Rfl:   •  Venlafaxine HCl (EFFEXOR PO), Take 75 tablets by mouth once, Disp: , Rfl:     Laboratory Results:  Lab Results   Component  "Value Date    WBC 4.71 11/02/2023    HGB 14.3 11/02/2023    HCT 42.6 11/02/2023    MCV 95 11/02/2023     11/02/2023     Lab Results   Component Value Date    SODIUM 144 05/06/2024    K 4.4 05/06/2024     05/06/2024    CO2 29 05/06/2024    BUN 17 05/06/2024    CREATININE 0.67 05/06/2024    GLUC 81 05/06/2024    CALCIUM 9.6 05/06/2024     Lab Results   Component Value Date    CALCIUM 9.6 05/06/2024    PHOS 3.7 11/02/2023     No results found for: \"LABPROT\"      "

## 2024-06-10 ENCOUNTER — TELEPHONE (OUTPATIENT)
Age: 74
End: 2024-06-10

## 2024-06-10 NOTE — TELEPHONE ENCOUNTER
Patient states she spoke to her son-in-law, who is an Interventional Radiologist and they agreed for patient to have a CTA as suggested. Please advise, thank you.

## 2024-06-11 DIAGNOSIS — I77.3 FIBROMUSCULAR DYSPLASIA OF BILATERAL RENAL ARTERIES (HCC): Primary | ICD-10-CM

## 2024-06-18 NOTE — PROGRESS NOTES
Assessment and Plan:   Ms. Jasso is a 73-year-old female with history significant for primary generalized osteoarthritis who presents for an evaluation of left hip pain.  She is referred by Dr. Easton Pickering for a rheumatology consult.    - Jaye presents today for an evaluation of left hip pain that she has been experiencing over the past year.  Based on the distribution of symptoms in her groin as well as lateral aspect of her hip joint, her symptoms appear representative of left hip osteoarthritis and greater trochanteric bursitis.  I will obtain an x-ray of her left hip joint to further evaluate, but she will likely need to establish with orthopedics to discuss the next steps in management.  The various joint pains she experiences are likely secondary to osteoarthritis as well.  Given a prior history of a possible undifferentiated connective tissue disease considered in her 20s I will obtain an inflammatory arthritis screen and determine based on this if she requires a rheumatology follow-up.    - As she has not had a DEXA scan done in many years I recommend obtaining this and it was ordered today.      Plan:  Diagnoses and all orders for this visit:    Diffuse arthralgia  -     Antinuclear Antibodies, IFA; Future  -     Sjogren's Antibodies; Future  -     Sedimentation rate, automated; Future  -     C-reactive protein; Future  -     RF Screen w/ Reflex to Titer; Future  -     Cyclic citrul peptide antibody, IgG; Future  -     Uric acid; Future    Left hip pain  -     XR hip/pelv 2-3 vws left if performed; Future  -     Ambulatory Referral to Orthopedic Surgery; Future    Trochanteric bursitis of left hip  -     Ambulatory Referral to Orthopedic Surgery; Future    Age-related osteoporosis without current pathological fracture  -     DXA bone density spine hip and pelvis; Future    Screening for thyroid disorder  -     TSH, 3rd generation; Future    Vitamin D deficiency  -     Vitamin D 25 hydroxy;  Future    Other orders  -     Cholecalciferol (Vitamin D3) 50 MCG (2000 UT) capsule; 6,000 IU daily  -     losartan (COZAAR) 50 mg tablet; 50 mg  -     venlafaxine (EFFEXOR-XR) 75 mg 24 hr capsule; TAKE 1 CAPSULE DAILY WITH 37.5 MG CAPSULE, TO EQUAL 112.5MG DAILY  -     fexofenadine (Allegra Allergy) 180 MG tablet  -     rosuvastatin (CRESTOR) 10 MG tablet; Take 10 mg by mouth daily      Activities as tolerated.   Exercise: try to maintain a low impact exercise regimen as much as possible. Walk for 30 minutes a day for at least 3 days a week.   Continue other medications as prescribed by PCP and other specialists.       RTC PRN.       HPI  Ms. Jasso is a 73-year-old female with history significant for primary generalized osteoarthritis who presents for an evaluation of left hip pain.  She is referred by Dr. Easton Pickering for a rheumatology consult.    Patient reports approximately 1 year ago she started to experience pain on the outer aspect of her left hip with the inability to lay on her side for prolonged durations of time at nighttime, as well as pain that radiates through her left groin.  The symptoms have been fairly constant over the past year.  She does have chronic joint pains that has affected her right elbow, low back and knees where she is known to have osteoarthritis and previously saw orthopedics for this.  Her main concern today is the left hip pain.  There has been no joint swelling.  She does experience morning stiffness affecting her diffusely that can take more than an hour to improve.  She also describes gelling phenomenon.  She tends to avoid any over-the-counter pain medications and has not tried anything for the left hip pain.    She reports in her 20s, presumably due to a positive RICHARD and joint pains there was concerns for an undifferentiated connective tissue disease.  There was no specific rheumatology follow-up for this.  She denies a history of inflammatory eye disease, psoriasis or  inflammatory bowel disease [does have a history of collagenous colitis].  She reports a history of rheumatoid arthritis in her cousins and systemic lupus erythematosus in her daughter.    The following portions of the patient's history were reviewed and updated as appropriate: allergies, current medications, past family history, past medical history, past social history, past surgical history and problem list.      Review of Systems  Constitutional: Negative for weight change, fevers, chills, night sweats, fatigue.  ENT/Mouth: Negative for hearing changes, ear pain, nasal congestion, sinus pain, hoarseness, sore throat, rhinorrhea, swallowing difficulty.   Eyes: Negative for pain, redness, discharge, vision changes.   Cardiovascular: Negative for chest pain, SOB, palpitations.   Respiratory: Negative for cough, sputum, wheezing, dyspnea.   Gastrointestinal: Negative for nausea, vomiting, diarrhea, constipation, pain, heartburn.  Genitourinary: Negative for dysuria, urinary frequency, hematuria.   Musculoskeletal: As per HPI.  Skin: Negative for skin rash, color changes.   Neuro: Negative for weakness, numbness, tingling, loss of consciousness.   Psych: Negative for anxiety, depression.   Heme/Lymph: Negative for easy bruising, bleeding, lymphadenopathy.        Past Medical History:   Diagnosis Date    Asymptomatic spider vein     Collagenous colitis     Disc disorder     Female bladder prolapse     Fibromuscular dysplasia (HCC)     FMD (facioscapulohumeral muscular dystrophy) (HCC)     Hypertension     Incomplete emptying of bladder     Osteoarthritis     Secondary hyperaldosteronism (HCC)        Past Surgical History:   Procedure Laterality Date    HYSTERECTOMY      TUBAL LIGATION         Social History     Socioeconomic History    Marital status: /Civil Union     Spouse name: Not on file    Number of children: Not on file    Years of education: Not on file    Highest education level: Not on file    Occupational History    Not on file   Tobacco Use    Smoking status: Never     Passive exposure: Never    Smokeless tobacco: Never   Vaping Use    Vaping status: Never Used   Substance and Sexual Activity    Alcohol use: Not Currently     Comment: Drinks wine     Drug use: Never    Sexual activity: Not on file     Comment: defer   Other Topics Concern    Not on file   Social History Narrative    Not on file     Social Determinants of Health     Financial Resource Strain: Not on file   Food Insecurity: Not on file   Transportation Needs: Not on file   Physical Activity: Not on file   Stress: Not on file   Social Connections: Not on file   Intimate Partner Violence: Not on file   Housing Stability: Not on file       Family History   Problem Relation Age of Onset    Gout Mother     Heart disease Mother     Hypertension Mother     Diabetes Father     Heart disease Father     Hypertension Father     Gout Sister     Heart disease Sister     Hypertension Sister     Gout Brother     Heart disease Brother     Hypertension Brother        Allergies   Allergen Reactions    Misc. Sulfonamide Containing Compounds Anaphylaxis    Sulfa Antibiotics Anaphylaxis and Other (See Comments)     rash    Zinc Rash    Other      Annotation - 26Jan2016: many inhalants, carries epi pen    Nabumetone Rash and Angioedema       Current Outpatient Medications:     losartan (COZAAR) 50 mg tablet, 50 mg, Disp: , Rfl:     rosuvastatin (CRESTOR) 10 MG tablet, Take 10 mg by mouth daily, Disp: , Rfl:     venlafaxine (EFFEXOR-XR) 75 mg 24 hr capsule, TAKE 1 CAPSULE DAILY WITH 37.5 MG CAPSULE, TO EQUAL 112.5MG DAILY, Disp: , Rfl:     Albuterol Sulfate 108 (90 Base) MCG/ACT AEPB, Inhale 2 puffs every 4 (four) hours, Disp: , Rfl:     aspirin 81 mg chewable tablet, 1 every other day, Disp: , Rfl:     beclomethasone (QVAR) 40 MCG/ACT inhaler, Inhale 2 puffs, Disp: , Rfl:     brinzolamide (AZOPT) 1 % ophthalmic suspension, , Disp: , Rfl:     Cholecalciferol  "(Vitamin D3) 50 MCG (2000 UT) capsule, 6,000 IU daily, Disp: , Rfl:     fexofenadine (Allegra Allergy) 180 MG tablet, , Disp: , Rfl:     LUMIGAN 0.01 % ophthalmic drops, Administer 0.001 drops to both eyes 2 (two) times a day, Disp: , Rfl: 11    metoprolol succinate (TOPROL-XL) 25 mg 24 hr tablet, Take 1 tablet (25 mg total) by mouth daily, Disp: 90 tablet, Rfl: 3    mometasone (NASONEX) 50 mcg/act nasal spray, SPRAY 2 SPRAYS INTO EACH NOSTRIL EVERY DAY, Disp: , Rfl:     multivitamin (THERAGRAN) TABS, Take 1 tablet by mouth daily, Disp: , Rfl:     ofloxacin (OCUFLOX) 0.3 % ophthalmic solution, Administer 1 drop into the left eye 4 (four) times a day, Disp: 5 mL, Rfl: 0    Olopatadine HCl 0.6 % SOLN, 2 SPRAYS BY EACH NARE ROUTE 2 (TWO) TIMES A DAY AS NEEDED (RHINITIS)., Disp: , Rfl:       Objective:    Vitals:    06/19/24 1157   BP: 120/70   Pulse: 76   SpO2: 100%   Weight: 72.6 kg (160 lb)   Height: 5' 4\" (1.626 m)       Physical Exam  General: Well appearing, well nourished, in no distress. Oriented x 3, normal mood and affect.  Ambulating without difficulty.  Skin: Good turgor, no rash, unusual bruising or prominent lesions.  Hair: Normal texture and distribution.  Nails: Normal color, no deformities.  HEENT:  Head: Normocephalic, atraumatic.  Eyes: Conjunctiva clear, sclera non-icteric, EOM intact.  Extremities: No amputations or deformities, cyanosis, edema.  Musculoskeletal:   Left hip - slight tenderness to direct palpation of the hip joint.  Normal range of motion.  Left trochanteric bursa tenderness noted.  Neurologic: Alert and oriented. No focal neurological deficits appreciated.   Psychiatric: Normal mood and affect.       Chrissy Byrne M.D.  Rheumatology  "

## 2024-06-19 ENCOUNTER — LAB (OUTPATIENT)
Dept: LAB | Facility: CLINIC | Age: 74
End: 2024-06-19
Payer: MEDICARE

## 2024-06-19 ENCOUNTER — OFFICE VISIT (OUTPATIENT)
Dept: RHEUMATOLOGY | Facility: CLINIC | Age: 74
End: 2024-06-19
Payer: MEDICARE

## 2024-06-19 ENCOUNTER — HOSPITAL ENCOUNTER (OUTPATIENT)
Dept: RADIOLOGY | Facility: HOSPITAL | Age: 74
Discharge: HOME | End: 2024-06-19
Payer: MEDICARE

## 2024-06-19 VITALS
BODY MASS INDEX: 27.31 KG/M2 | HEART RATE: 76 BPM | SYSTOLIC BLOOD PRESSURE: 120 MMHG | HEIGHT: 64 IN | DIASTOLIC BLOOD PRESSURE: 70 MMHG | OXYGEN SATURATION: 100 % | WEIGHT: 160 LBS

## 2024-06-19 DIAGNOSIS — M25.552 LEFT HIP PAIN: ICD-10-CM

## 2024-06-19 DIAGNOSIS — M25.50 DIFFUSE ARTHRALGIA: Primary | ICD-10-CM

## 2024-06-19 DIAGNOSIS — Z13.29 SCREENING FOR THYROID DISORDER: ICD-10-CM

## 2024-06-19 DIAGNOSIS — M70.62 TROCHANTERIC BURSITIS OF LEFT HIP: ICD-10-CM

## 2024-06-19 DIAGNOSIS — E55.9 VITAMIN D DEFICIENCY: ICD-10-CM

## 2024-06-19 DIAGNOSIS — M25.50 DIFFUSE ARTHRALGIA: ICD-10-CM

## 2024-06-19 DIAGNOSIS — M81.0 AGE-RELATED OSTEOPOROSIS WITHOUT CURRENT PATHOLOGICAL FRACTURE: ICD-10-CM

## 2024-06-19 LAB
25(OH)D3 SERPL-MCNC: 41.1 NG/ML (ref 30–100)
CRP SERPL QL: <1 MG/L
ERYTHROCYTE [SEDIMENTATION RATE] IN BLOOD: 4 MM/HOUR (ref 0–29)
TSH SERPL DL<=0.05 MIU/L-ACNC: 3.96 UIU/ML (ref 0.45–4.5)
URATE SERPL-MCNC: 4.2 MG/DL (ref 2–7.5)

## 2024-06-19 PROCEDURE — 86235 NUCLEAR ANTIGEN ANTIBODY: CPT

## 2024-06-19 PROCEDURE — 86140 C-REACTIVE PROTEIN: CPT

## 2024-06-19 PROCEDURE — 84550 ASSAY OF BLOOD/URIC ACID: CPT

## 2024-06-19 PROCEDURE — 82306 VITAMIN D 25 HYDROXY: CPT

## 2024-06-19 PROCEDURE — 84443 ASSAY THYROID STIM HORMONE: CPT

## 2024-06-19 PROCEDURE — 85652 RBC SED RATE AUTOMATED: CPT

## 2024-06-19 PROCEDURE — 99204 OFFICE O/P NEW MOD 45 MIN: CPT | Performed by: INTERNAL MEDICINE

## 2024-06-19 PROCEDURE — 36415 COLL VENOUS BLD VENIPUNCTURE: CPT

## 2024-06-19 PROCEDURE — 86200 CCP ANTIBODY: CPT

## 2024-06-19 PROCEDURE — 86038 ANTINUCLEAR ANTIBODIES: CPT

## 2024-06-19 PROCEDURE — 86430 RHEUMATOID FACTOR TEST QUAL: CPT

## 2024-06-19 PROCEDURE — 73502 X-RAY EXAM HIP UNI 2-3 VIEWS: CPT

## 2024-06-19 RX ORDER — VENLAFAXINE HYDROCHLORIDE 75 MG/1
CAPSULE, EXTENDED RELEASE ORAL
COMMUNITY
Start: 2024-04-05

## 2024-06-19 RX ORDER — ACETAMINOPHEN 160 MG
TABLET,DISINTEGRATING ORAL
COMMUNITY

## 2024-06-19 RX ORDER — FEXOFENADINE HCL 180 MG/1
TABLET ORAL
COMMUNITY

## 2024-06-19 RX ORDER — LOSARTAN POTASSIUM 50 MG/1
50 TABLET ORAL
COMMUNITY
Start: 2023-05-26

## 2024-06-19 RX ORDER — ROSUVASTATIN CALCIUM 10 MG/1
10 TABLET, COATED ORAL DAILY
COMMUNITY
Start: 2024-05-18

## 2024-06-20 LAB
ENA SS-A AB SER-ACNC: <0.2 AI (ref 0–0.9)
ENA SS-B AB SER-ACNC: <0.2 AI (ref 0–0.9)
RHEUMATOID FACT SER QL LA: NEGATIVE

## 2024-06-21 LAB
ANA HOMOGEN TITR SER: ABNORMAL {TITER}
ANA TITR SER IF: POSITIVE {TITER}
CCP AB SER IA-ACNC: <0.4
SL AMB NOTE:: ABNORMAL

## 2024-06-22 ENCOUNTER — TELEPHONE (OUTPATIENT)
Dept: RHEUMATOLOGY | Facility: CLINIC | Age: 74
End: 2024-06-22

## 2024-06-22 DIAGNOSIS — R76.8 POSITIVE ANA (ANTINUCLEAR ANTIBODY): Primary | ICD-10-CM

## 2024-06-22 NOTE — TELEPHONE ENCOUNTER
Please let her know all the specific arthritis testing is normal. 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 Labs will be in chart and we will be in touch once results are received.    Please also ask her to schedule the DEXA.

## 2024-06-24 ENCOUNTER — TELEPHONE (OUTPATIENT)
Dept: RHEUMATOLOGY | Facility: CLINIC | Age: 74
End: 2024-06-24

## 2024-06-24 NOTE — TELEPHONE ENCOUNTER
The patient was called and a VM was left letting her know her testing came out normal except for the RICHARD. Additional testing was placed into her chart to complete to look further into it. She was also asked to complete her DEXA scan.

## 2024-06-28 ENCOUNTER — HOSPITAL ENCOUNTER (OUTPATIENT)
Dept: CT IMAGING | Facility: HOSPITAL | Age: 74
Discharge: HOME | End: 2024-06-28
Attending: INTERNAL MEDICINE
Payer: MEDICARE

## 2024-06-28 DIAGNOSIS — I77.3 FIBROMUSCULAR DYSPLASIA OF BILATERAL RENAL ARTERIES (HCC): ICD-10-CM

## 2024-06-28 PROCEDURE — 74175 CTA ABDOMEN W/CONTRAST: CPT

## 2024-06-28 RX ADMIN — IOHEXOL 100 ML: 350 INJECTION, SOLUTION INTRAVENOUS at 15:52

## 2024-07-01 ENCOUNTER — APPOINTMENT (OUTPATIENT)
Dept: LAB | Age: 74
End: 2024-07-01
Payer: MEDICARE

## 2024-07-01 DIAGNOSIS — R76.8 POSITIVE ANA (ANTINUCLEAR ANTIBODY): ICD-10-CM

## 2024-07-01 LAB
BACTERIA UR QL AUTO: ABNORMAL /HPF
BILIRUB UR QL STRIP: NEGATIVE
C3 SERPL-MCNC: 117 MG/DL (ref 87–200)
C4 SERPL-MCNC: 33 MG/DL (ref 19–52)
CLARITY UR: CLEAR
COLOR UR: YELLOW
CREAT UR-MCNC: 62 MG/DL
GLUCOSE UR STRIP-MCNC: NEGATIVE MG/DL
HGB UR QL STRIP.AUTO: NEGATIVE
KETONES UR STRIP-MCNC: NEGATIVE MG/DL
LEUKOCYTE ESTERASE UR QL STRIP: ABNORMAL
NITRITE UR QL STRIP: NEGATIVE
NON-SQ EPI CELLS URNS QL MICRO: ABNORMAL /HPF
PH UR STRIP.AUTO: 6.5 [PH]
PROT UR STRIP-MCNC: NEGATIVE MG/DL
PROT UR-MCNC: 5 MG/DL
PROT/CREAT UR: 0.08 MG/G{CREAT} (ref 0–0.1)
RBC #/AREA URNS AUTO: ABNORMAL /HPF
SP GR UR STRIP.AUTO: 1.01 (ref 1–1.03)
TRANS CELLS #/AREA URNS HPF: PRESENT /[HPF]
UROBILINOGEN UR STRIP-ACNC: <2 MG/DL
WBC #/AREA URNS AUTO: ABNORMAL /HPF

## 2024-07-01 PROCEDURE — 36415 COLL VENOUS BLD VENIPUNCTURE: CPT

## 2024-07-01 PROCEDURE — 85613 RUSSELL VIPER VENOM DILUTED: CPT

## 2024-07-01 PROCEDURE — 85705 THROMBOPLASTIN INHIBITION: CPT

## 2024-07-01 PROCEDURE — 86235 NUCLEAR ANTIGEN ANTIBODY: CPT

## 2024-07-01 PROCEDURE — 85670 THROMBIN TIME PLASMA: CPT

## 2024-07-01 PROCEDURE — 85732 THROMBOPLASTIN TIME PARTIAL: CPT

## 2024-07-01 PROCEDURE — 86147 CARDIOLIPIN ANTIBODY EA IG: CPT

## 2024-07-01 PROCEDURE — 82570 ASSAY OF URINE CREATININE: CPT

## 2024-07-01 PROCEDURE — 81001 URINALYSIS AUTO W/SCOPE: CPT

## 2024-07-01 PROCEDURE — 84156 ASSAY OF PROTEIN URINE: CPT

## 2024-07-01 PROCEDURE — 86146 BETA-2 GLYCOPROTEIN ANTIBODY: CPT

## 2024-07-01 PROCEDURE — 86160 COMPLEMENT ANTIGEN: CPT

## 2024-07-01 PROCEDURE — 86225 DNA ANTIBODY NATIVE: CPT

## 2024-07-02 ENCOUNTER — TELEPHONE (OUTPATIENT)
Dept: RHEUMATOLOGY | Facility: CLINIC | Age: 74
End: 2024-07-02

## 2024-07-02 ENCOUNTER — TELEPHONE (OUTPATIENT)
Dept: NEPHROLOGY | Facility: CLINIC | Age: 74
End: 2024-07-02

## 2024-07-02 LAB
APTT SCREEN TO CONFIRM RATIO: 1.17 RATIO (ref 0–1.34)
B2 GLYCOPROT1 IGA SERPL IA-ACNC: 1.1
B2 GLYCOPROT1 IGG SERPL IA-ACNC: <0.8
B2 GLYCOPROT1 IGM SERPL IA-ACNC: <2.4
CARDIOLIPIN IGA SER IA-ACNC: 1.9
CARDIOLIPIN IGG SER IA-ACNC: 2.8
CARDIOLIPIN IGM SER IA-ACNC: 1
CENTROMERE B AB SER-ACNC: <0.2 AI (ref 0–0.9)
CONFIRM APTT/NORMAL: 35 SEC (ref 0–47.6)
DSDNA AB SER-ACNC: <1 IU/ML (ref 0–9)
ENA RNP AB SER-ACNC: <0.2 AI (ref 0–0.9)
ENA SCL70 AB SER-ACNC: <0.2 AI (ref 0–0.9)
ENA SM AB SER-ACNC: <0.2 AI (ref 0–0.9)
LA PPP-IMP: NORMAL
SCREEN APTT: 32 SEC (ref 0–43.5)
SCREEN DRVVT: 30.1 SEC (ref 0–47)
THROMBIN TIME: 18.6 SEC (ref 0–23)

## 2024-07-02 NOTE — TELEPHONE ENCOUNTER
----- Message from Raul Up MD sent at 7/2/2024  2:20 PM EDT -----  Please let her know that the CTA showed that there was not significant narrowing of either artery to the kidney.  It said it was mild so we will be nothing to do anything about.  They did not make any comment about fibromuscular dysplasia.    I also saw that her blood pressure was excellent with the start of the new medication when she saw another physician.  Just let her know everything looks like it is doing well and see if she has any questions.

## 2024-07-02 NOTE — TELEPHONE ENCOUNTER
----- Message from Chrissy Byrne MD sent at 7/2/2024  3:08 PM EDT -----  Please let her know all the additional autoimmune testing is normal, so rheumatic diagnosis at this time.

## 2024-07-02 NOTE — TELEPHONE ENCOUNTER
Called patient and went over the following information:    Please let her know that the CTA showed that there was not significant narrowing of either artery to the kidney.  It said it was mild so we will be nothing to do anything about.  They did not make any comment about fibromuscular dysplasia.    I also saw that her blood pressure was excellent with the start of the new medication when she saw another physician.  Just let her know everything looks like it is doing well and see if she has any questions.    Patient verbally understood and had no further questions for me at this time.

## 2024-07-05 ENCOUNTER — TELEPHONE (OUTPATIENT)
Age: 74
End: 2024-07-05

## 2024-07-05 NOTE — TELEPHONE ENCOUNTER
Patient calling states she wants to speak with someone in the office or the doctor. Warm transferred to rohit.

## 2024-07-05 NOTE — TELEPHONE ENCOUNTER
Patient called the pod, was transferred to me, patient wanted Dr. Up to know that in 2010 she has a procedure for her kidneys to be evaluated to give a kidney to her .

## 2024-07-08 ENCOUNTER — OFFICE VISIT (OUTPATIENT)
Dept: OBGYN CLINIC | Facility: CLINIC | Age: 74
End: 2024-07-08
Payer: MEDICARE

## 2024-07-08 VITALS
WEIGHT: 158 LBS | HEIGHT: 64 IN | BODY MASS INDEX: 26.98 KG/M2 | HEART RATE: 68 BPM | SYSTOLIC BLOOD PRESSURE: 140 MMHG | DIASTOLIC BLOOD PRESSURE: 71 MMHG

## 2024-07-08 DIAGNOSIS — M70.62 TROCHANTERIC BURSITIS OF LEFT HIP: ICD-10-CM

## 2024-07-08 DIAGNOSIS — M76.32 IT BAND SYNDROME, LEFT: ICD-10-CM

## 2024-07-08 DIAGNOSIS — M16.12 PRIMARY OSTEOARTHRITIS OF LEFT HIP: ICD-10-CM

## 2024-07-08 DIAGNOSIS — M35.9 DIFFUSE CONNECTIVE TISSUE DISEASE (HCC): ICD-10-CM

## 2024-07-08 DIAGNOSIS — M76.12 PSOAS TENDONITIS OF LEFT SIDE: Primary | ICD-10-CM

## 2024-07-08 PROCEDURE — 99204 OFFICE O/P NEW MOD 45 MIN: CPT | Performed by: ORTHOPAEDIC SURGERY

## 2024-07-08 PROCEDURE — 20610 DRAIN/INJ JOINT/BURSA W/O US: CPT

## 2024-07-08 RX ORDER — TRIAMCINOLONE ACETONIDE 40 MG/ML
80 INJECTION, SUSPENSION INTRA-ARTICULAR; INTRAMUSCULAR
Status: COMPLETED | OUTPATIENT
Start: 2024-07-08 | End: 2024-07-08

## 2024-07-08 RX ORDER — BUPIVACAINE HYDROCHLORIDE 2.5 MG/ML
2 INJECTION, SOLUTION INFILTRATION; PERINEURAL
Status: COMPLETED | OUTPATIENT
Start: 2024-07-08 | End: 2024-07-08

## 2024-07-08 RX ADMIN — TRIAMCINOLONE ACETONIDE 80 MG: 40 INJECTION, SUSPENSION INTRA-ARTICULAR; INTRAMUSCULAR at 13:00

## 2024-07-08 RX ADMIN — BUPIVACAINE HYDROCHLORIDE 2 ML: 2.5 INJECTION, SOLUTION INFILTRATION; PERINEURAL at 13:00

## 2024-07-08 NOTE — PROGRESS NOTES
Assessment:  1. Psoas tendonitis of left side  Ambulatory Referral to Orthopedic Surgery    Ambulatory Referral to Physical Therapy    Ambulatory Referral to Orthopedic Surgery      2. Trochanteric bursitis of left hip  Ambulatory Referral to Orthopedic Surgery    Ambulatory Referral to Physical Therapy      3. It band syndrome, left  Ambulatory Referral to Physical Therapy      4. Primary osteoarthritis of left hip  Ambulatory Referral to Physical Therapy    Ambulatory Referral to Orthopedic Surgery      5. Diffuse connective tissue disease (HCC)          Patient Active Problem List   Diagnosis    Asthma    Osteoporosis    Fibromuscular dysplasia (HCC)    Renovascular hypertension    Trochanteric bursitis of left hip    Psoas tendonitis of left side    It band syndrome, left    Primary osteoarthritis of left hip    Diffuse connective tissue disease (HCC)       Plan:    73 y.o. female with IT band syndrome, greater trochanteric bursitis and psoas tendinitis of the left hip complicated by osteoarthritis    Discussed x-ray findings of the left hip  Discussed underlying pathology of diagnosis  Discussed steroid injection of the left greater trochanteric bursa.  Discussed side effects and risks.  Patient agreeable.  Injections administered.  Discussed referral to Dr. Chery for a psoas tendon sheath injection.  Discussed if persistent pain after this injection can pursue a intra-articular left hip injection.  Referral provided today.  Discussed physical therapy to work on stretching secondary to underlying IT band syndrome and psoas tendinitis.  Patient expressed understanding.  Referral provided today.  Patient to follow-up with Dr. Chery.    The patient was seen and examined by Dr. Dunlap and myself. The assessment and plan were formulated by Dr. Dunlap and I assisted in carrying it out.  Subjective:   Patient ID: Jaye Jasso is a 73 y.o. female .    HPI    Patient comes in today with regards to left hip  pain.  Patient is referred to us by Feliciano Mccormack Jr., MD for further evaluation. Patient notes pain in the lateral aspect of the left hip as well as anterior groin pain. Patient notes pain has been persistent for about 1 year. Patient notes increase in pain after rest  as well as stiffness, patient also notes pain with getting out of the car and lying on the left side. Patient notes use of PT as well as PO prednisone without great relief of pain. Patient denies previous CSI. Denies previous surgery or injury.       The following portions of the patient's history were reviewed and updated as appropriate: allergies, current medications, past family history, past social history, past surgical history and problem list.    Social History     Socioeconomic History    Marital status: /Civil Union     Spouse name: Not on file    Number of children: Not on file    Years of education: Not on file    Highest education level: Not on file   Occupational History    Not on file   Tobacco Use    Smoking status: Never     Passive exposure: Never    Smokeless tobacco: Never   Vaping Use    Vaping status: Never Used   Substance and Sexual Activity    Alcohol use: Not Currently     Comment: Drinks wine     Drug use: Never    Sexual activity: Not on file     Comment: defer   Other Topics Concern    Not on file   Social History Narrative    Not on file     Social Determinants of Health     Financial Resource Strain: Not on file   Food Insecurity: Not on file   Transportation Needs: Not on file   Physical Activity: Not on file   Stress: Not on file   Social Connections: Not on file   Intimate Partner Violence: Not on file   Housing Stability: Not on file     Past Medical History:   Diagnosis Date    Asymptomatic spider vein     Collagenous colitis     Disc disorder     Female bladder prolapse     Fibromuscular dysplasia (HCC)     FMD (facioscapulohumeral muscular dystrophy) (HCC)     Hypertension     Incomplete emptying of  bladder     Osteoarthritis     Secondary hyperaldosteronism (HCC)      Past Surgical History:   Procedure Laterality Date    HYSTERECTOMY      TUBAL LIGATION       Allergies   Allergen Reactions    Misc. Sulfonamide Containing Compounds Anaphylaxis    Sulfa Antibiotics Anaphylaxis and Other (See Comments)     rash    Zinc Rash    Other      Annotation - 26Jan2016: many inhalants, carries epi pen    Nabumetone Rash and Angioedema     Current Outpatient Medications on File Prior to Visit   Medication Sig Dispense Refill    Albuterol Sulfate 108 (90 Base) MCG/ACT AEPB Inhale 2 puffs every 4 (four) hours      aspirin 81 mg chewable tablet 1 every other day      beclomethasone (QVAR) 40 MCG/ACT inhaler Inhale 2 puffs      brinzolamide (AZOPT) 1 % ophthalmic suspension       Cholecalciferol (Vitamin D3) 50 MCG (2000 UT) capsule 6,000 IU daily      fexofenadine (Allegra Allergy) 180 MG tablet       losartan (COZAAR) 50 mg tablet 50 mg      LUMIGAN 0.01 % ophthalmic drops Administer 0.001 drops to both eyes 2 (two) times a day  11    metoprolol succinate (TOPROL-XL) 25 mg 24 hr tablet Take 1 tablet (25 mg total) by mouth daily 90 tablet 3    mometasone (NASONEX) 50 mcg/act nasal spray SPRAY 2 SPRAYS INTO EACH NOSTRIL EVERY DAY      multivitamin (THERAGRAN) TABS Take 1 tablet by mouth daily      ofloxacin (OCUFLOX) 0.3 % ophthalmic solution Administer 1 drop into the left eye 4 (four) times a day 5 mL 0    Olopatadine HCl 0.6 % SOLN 2 SPRAYS BY EACH NARE ROUTE 2 (TWO) TIMES A DAY AS NEEDED (RHINITIS).      rosuvastatin (CRESTOR) 10 MG tablet Take 10 mg by mouth daily      venlafaxine (EFFEXOR-XR) 75 mg 24 hr capsule TAKE 1 CAPSULE DAILY WITH 37.5 MG CAPSULE, TO EQUAL 112.5MG DAILY       No current facility-administered medications on file prior to visit.       Review of Systems   Constitutional:  Negative for chills and fever.   HENT:  Negative for ear pain and sore throat.    Eyes:  Negative for pain and visual disturbance.    Respiratory:  Negative for cough and shortness of breath.    Cardiovascular:  Negative for chest pain and palpitations.   Gastrointestinal:  Negative for abdominal pain and vomiting.   Genitourinary:  Negative for dysuria and hematuria.   Musculoskeletal:  Positive for arthralgias and back pain.   Skin:  Negative for color change and rash.   Neurological:  Negative for seizures and syncope.   All other systems reviewed and are negative.        Objective:    Vitals:    07/08/24 1305   BP: 140/71   Pulse: 68       Physical Exam  Vitals and nursing note reviewed.   Constitutional:       General: She is not in acute distress.     Appearance: She is well-developed.   HENT:      Head: Normocephalic and atraumatic.   Eyes:      Conjunctiva/sclera: Conjunctivae normal.   Cardiovascular:      Rate and Rhythm: Normal rate and regular rhythm.      Heart sounds: No murmur heard.  Pulmonary:      Effort: Pulmonary effort is normal. No respiratory distress.      Breath sounds: Normal breath sounds.   Abdominal:      Palpations: Abdomen is soft.      Tenderness: There is no abdominal tenderness.   Musculoskeletal:         General: No swelling.      Cervical back: Neck supple.   Skin:     General: Skin is warm and dry.      Capillary Refill: Capillary refill takes less than 2 seconds.   Neurological:      Mental Status: She is alert.   Psychiatric:         Mood and Affect: Mood normal.         Left Hip Exam     Tenderness   The patient is experiencing tenderness in the greater trochanter.    Range of Motion   External rotation:  normal   Internal rotation: normal     Muscle Strength   Abduction: 5/5   Adduction: 5/5   Flexion: 5/5     Tests   ELODIA: positive    Comments:  Pain with internal and external rotation   TTP psoas tendon, greater troch, ITB             I have personally reviewed pertinent films in PACS and my interpretation is xray left hip reveals mild osteoarthritis.    Large joint arthrocentesis: L greater  "trochanteric bursa  Universal Protocol:  Consent: Verbal consent obtained.  Risks and benefits: risks, benefits and alternatives were discussed  Consent given by: patient  Patient understanding: patient states understanding of the procedure being performed  Patient identity confirmed: verbally with patient  Supporting Documentation  Indications: pain   Procedure Details  Location: hip - L greater trochanteric bursa  Needle size: 22 G  Ultrasound guidance: no  Approach: lateral  Medications administered: 2 mL bupivacaine 0.25 %; 80 mg triamcinolone acetonide 40 mg/mL    Patient tolerance: patient tolerated the procedure well with no immediate complications  Dressing:  Sterile dressing applied               Scribe Attestation      I,:  Luz Sood PA-C am acting as a scribe while in the presence of the attending physician.:       I,:  Patrick Dunlap, DO personally performed the services described in this documentation    as scribed in my presence.:               Portions of the record may have been created with voice recognition software.  Occasional wrong word or \"sound a like\" substitutions may have occurred due to the inherent limitations of voice recognition software.  Read the chart carefully and recognize, using context, where substitutions have occurred.   "

## 2024-07-09 ENCOUNTER — EVALUATION (OUTPATIENT)
Dept: PHYSICAL THERAPY | Age: 74
End: 2024-07-09
Payer: MEDICARE

## 2024-07-09 DIAGNOSIS — M70.62 TROCHANTERIC BURSITIS OF LEFT HIP: ICD-10-CM

## 2024-07-09 DIAGNOSIS — M16.12 PRIMARY OSTEOARTHRITIS OF LEFT HIP: ICD-10-CM

## 2024-07-09 DIAGNOSIS — M76.12 PSOAS TENDONITIS OF LEFT SIDE: Primary | ICD-10-CM

## 2024-07-09 DIAGNOSIS — M76.32 IT BAND SYNDROME, LEFT: ICD-10-CM

## 2024-07-09 PROCEDURE — 97162 PT EVAL MOD COMPLEX 30 MIN: CPT | Performed by: PHYSICAL THERAPIST

## 2024-07-09 PROCEDURE — 97110 THERAPEUTIC EXERCISES: CPT | Performed by: PHYSICAL THERAPIST

## 2024-07-09 NOTE — PROGRESS NOTES
PT Evaluation     Today's date: 2024  Patient name: Jaye Jasso  : 1950  MRN: [id number]  Referring provider: Luz Sood PA-C  Dx:   Encounter Diagnosis     ICD-10-CM    1. Psoas tendonitis of left side  M76.12       2. Trochanteric bursitis of left hip  M70.62       3. It band syndrome, left  M76.32       4. Primary osteoarthritis of left hip  M16.12                      Assessment  Impairments: abnormal gait, abnormal or restricted ROM, abnormal movement, activity intolerance, impaired physical strength, lacks appropriate home exercise program and pain with function    Assessment details: PT IE: 24  Patient reported she has had left lateral hip pain that is chronic that has ebbs and flows that limit sleep, walking, standing, transfers and stair climbing activities.  Patient noted prolonged sitting and then standing is limited by severe pain aggravation that she needs several steps to improve her gait to normal pace.  Patient noted she has left groin and lumbar spine pain.  Patient noted she has left > right lateral hip pain.  Patient noted getting in and out of the car is pain aggravating.  Patient noted left lateral hip pain will radiate into the left lateral knee and left groin pain will radiate into the middle of the adductor muscle.  Patient noted she has had several episodes of the past 6 weeks of left knee buckle without falling and she denies falls.  Patient noted she has lumbar spine DDD.  Patient noted she had a left lateral hip ITB trochanteric bursa.  Patient noted noted has bilateral hip weakness persists that limits standing based functional activities.  Patient noted sleep is deprived as well.  Understanding of Dx/Px/POC: excellent     Prognosis: good  Prognosis details: Patient is a 73 y.o. year old female seen for outpatient PT evaluation with a Psoas tendonitis of left side [M76.12] . Patient presents to PT IE with the following problems, concerns, deficits and  impairments: left hip and groin pain, decreased lumbar spine range of motion, decreased left hip strength, gait and stair dysfunctions, transfer dysfunction, + TTP, + special tests, functional limitations and decreased tolerance to activity.  Patient would benefit from skilled PT services under the following PT treatment plan to address the above noted deficits: therapeutic exercises and activities to facilitate lumbar spine mobility and bilateral lower extremity mobility and strength, modalities, manual therapy techniques, IASTM techniques, Kinesio taping techniques, gait and stair training, transfer training, traction, DLS and core strengthening, and a hep.  Thank you for the referral.     Goals  Short Term goals - 4 weeks  1.  Patient will be independent HEP.   2.  Patient will report a 25 - 50% decrease in pain complaints.  3.  Increase strength 1/2 grade.  4.  Increase ROM 5-10 degrees.    Long Term goals - 8 weeks  1.  Patient will report elimination of pain complaints.  2.  Patient will return to all work related activities without restriction.  3.  Patient will return to all recreational activities without restriction.  4.  ROM WFL.  5.  Strength 5/5.  6.  Patient will report ability to perform all transfers without left hip and lumbar spine symptom aggravation or limitations.  7.  Patient will report ability to increase left side lying by > 10-15 minutes prior to left hip and lumbar spine symptom aggravation or limitations.  8.  Patient will report sleep improved by > 20-30 minutes prior to left hip and lumbar spine symptom aggravation or limitations.  9.  Patient will report ability to improve her static standing activities involving house hold and work chores and activities without left hip and lumbar spine symptom aggravation or limitations.  10.  Patient will report ability to perform stair climbing without left hip and lumbar spine symptom aggravation or limitations.  11.  Patient will report ability  to walk for an additional 10-15 minutes prior to left hip and lumbar spine symptom aggravation or limitations.    Plan  Patient would benefit from: skilled physical therapy and PT eval  Planned modality interventions: low level laser therapy, manual electrical stimulation, TENS, thermotherapy: hydrocollator packs, ultrasound, unattended electrical stimulation, cryotherapy and electrical stimulation/Russian stimulation    Planned therapy interventions: IASTM, joint mobilization, kinesiology taping, manual therapy, massage, balance, balance/weight bearing training, neuromuscular re-education, postural training, body mechanics training, self care, compression, strengthening, stretching, therapeutic activities, therapeutic exercise, therapeutic training, transfer training, flexibility, functional ROM exercises, gait training, graded activity, graded exercise, graded motor, home exercise program, IADL retraining and aquatic therapy    Frequency: 2x week  Duration in weeks: 8  Treatment plan discussed with: patient        Subjective Evaluation    History of Present Illness  Mechanism of injury: Patient's PMHx is remarkable for HTN, Asthma and Osteoporosis.      LEFT HIP     INDICATION:   Pain in left hip.      COMPARISON:  None.     VIEWS:  XR HIP/PELV 2-3 VWS LEFT  W PELVIS IF PERFORMED   Images: 3     FINDINGS:     There is no acute fracture or dislocation.     Mild left hip osteoarthritis is seen.     No lytic or blastic osseous lesion.     Soft tissues are unremarkable.     Degenerative changes pubic symphysis and visualized lower lumbar spine.     IMPRESSION:        No acute osseous abnormality.     Degenerative changes as described.       Patient Goals  Patient goals for therapy: decreased pain, increased motion, improved balance, increased strength, independence with ADLs/IADLs and return to sport/leisure activities  Patient goal: to move better with less pain  Pain  At best pain ratin  At worst pain rating:  8  Location: Left hip        Objective     Tenderness     Additional Tenderness Details  Patient is + TTP at severe levels at left greater trochanter region, as well as left ITB and lateral knee at moderate levels.    Active Range of Motion     Lumbar   Flexion: 100 degrees   Extension: 25 degrees   Left rotation: 55 degrees   Right rotation: 64 degrees   Left Hip   Flexion: 112 degrees with pain  Abduction: 12 degrees with pain  External rotation (90/90): 20 degrees with pain  Internal rotation (90/90): 24 degrees with pain    Right Hip   Flexion: 124 degrees   Abduction: 34 degrees   External rotation (90/90): 30 degrees   Internal rotation (90/90): 50 degrees   Left Knee   Flexion: 132 degrees   Extension: -2 degrees   Extensor la degrees with pain    Right Knee   Flexion: 130 degrees   Extension: 0 degrees   Extensor la degrees     Strength/Myotome Testing     Left Hip   Planes of Motion   Flexion: 4-  Extension: 4-  Abduction: 4-  Adduction: 4  External rotation: 4-  Internal rotation: 3+    Right Hip   Planes of Motion   Flexion: 4  Extension: 4  Abduction: 4  Adduction: 4  External rotation: 4  Internal rotation: 4    Left Knee   Flexion: 3+  Extension: 3+    Right Knee   Flexion: 4-  Extension: 4-    Left Ankle/Foot   Dorsiflexion: 4  Plantar flexion: 5    Right Ankle/Foot   Dorsiflexion: 4  Plantar flexion: 5    Tests     Left Hip   Positive ELODIA and piriformis.   Negative scour.   Inocente: Positive.     Ambulation     Ambulation: Level Surfaces   Ambulation without assistive device: independent    Additional Level Surfaces Ambulation Details  Patient ambulates with antalgic gait pattern of decrease in left stance and right swing phase.    Ambulation: Stairs   Ascend stairs: independent  Pattern: non-reciprocal  Railings: two rails  Descend stairs: independent  Pattern: non-reciprocal  Railings: two rails             Precautions: Patient's PMHx is remarkable for HTN, Asthma and  Osteoporosis.    Access Code: [email or web address]  URL: [email or web address]  Date: 07/09/2024  Prepared by: Ramana Gracia    Exercises  - ITB Stretch at Wall  - 1 x daily - 7 x weekly - 1 sets - 5 reps - 10 seconds hold  - Hip Flexor Stretch on Step  - 1 x daily - 7 x weekly - 1 sets - 5 reps - 10 seconds hold      Manuals 7/9            IASTM to left lateral hip and ITB region                                                     Neuro Re-Ed                                                                                                        Ther Ex                          Nu step                          Standing ITB stretch:L Hep instruction            Standing hip flexors stretch:B: Hep instruction                         LAQ:B:                          Supine hamstring stretch:B:             LTR:B:             Piriformis stretch:B:             DLS abdominal bracing             bridges             DLS abdominal bracing with hip flexion:B:             DLS abdominal bracing with slr flexion:B:             SAQ:B:             Supine hip abduction             Hook lying hip adduction isometrics:B:             Hook lying hip abduction isometrics:B:                          Prone TKE:B:             Prone hip extension:B:             Prone hip IR and ER:L             Prone knee flexion stretch:L:             Prone knee flexion arom:B:                          Mini squats             Lunges on foam:B:             SLR x 3:B:             Forward step ups:B             Lateral step ups:B:             Step downs:B             Hip hiking:B:                                                                 Ther Activity                                       Gait Training                                       Modalities             MHP to bilateral hips sitting PRN

## 2024-07-12 ENCOUNTER — IOP CHECK (OUTPATIENT)
Dept: URBAN - METROPOLITAN AREA CLINIC 6 | Facility: CLINIC | Age: 74
End: 2024-07-12

## 2024-07-12 ENCOUNTER — OFFICE VISIT (OUTPATIENT)
Dept: PHYSICAL THERAPY | Age: 74
End: 2024-07-12
Payer: MEDICARE

## 2024-07-12 DIAGNOSIS — M76.12 PSOAS TENDONITIS OF LEFT SIDE: Primary | ICD-10-CM

## 2024-07-12 DIAGNOSIS — M76.32 IT BAND SYNDROME, LEFT: ICD-10-CM

## 2024-07-12 DIAGNOSIS — M16.12 PRIMARY OSTEOARTHRITIS OF LEFT HIP: ICD-10-CM

## 2024-07-12 DIAGNOSIS — H40.1131: ICD-10-CM

## 2024-07-12 DIAGNOSIS — H25.813: ICD-10-CM

## 2024-07-12 DIAGNOSIS — M70.62 TROCHANTERIC BURSITIS OF LEFT HIP: ICD-10-CM

## 2024-07-12 PROCEDURE — 97110 THERAPEUTIC EXERCISES: CPT | Performed by: PHYSICAL THERAPIST

## 2024-07-12 PROCEDURE — 92012 INTRM OPH EXAM EST PATIENT: CPT

## 2024-07-12 PROCEDURE — 97112 NEUROMUSCULAR REEDUCATION: CPT | Performed by: PHYSICAL THERAPIST

## 2024-07-12 PROCEDURE — 92020 GONIOSCOPY: CPT

## 2024-07-12 ASSESSMENT — VISUAL ACUITY
OD_CC: 20/20
OS_CC: 20/25

## 2024-07-12 ASSESSMENT — TONOMETRY
OS_IOP_MMHG: 18
OD_IOP_MMHG: 19
OD_IOP_MMHG: 19
OS_IOP_MMHG: 17

## 2024-07-12 NOTE — PROGRESS NOTES
Daily Note     Today's date: 2024  Patient name: Jaye Jasso  : 1950  MRN: [id number]  Referring provider: Luz Sood PA-C  Dx:   Encounter Diagnosis     ICD-10-CM    1. Psoas tendonitis of left side  M76.12       2. Trochanteric bursitis of left hip  M70.62       3. It band syndrome, left  M76.32       4. Primary osteoarthritis of left hip  M16.12                      Subjective: Patient reported left lateral hip pain is a 6 of 10.  Patient noted she has not buckle since onset of left lateral hip pain.  Patient noted pivot movements and car transfers are pain aggravating into bilateral groin.      Objective: See treatment diary below      Assessment: Tolerated treatment well. Patient demonstrated fatigue post treatment and would benefit from continued PT.  Patient presents with left hip pain aggravation with open kinetic chain activities despite all therapeutic exercises and activities performed in a pain aggravating free arc of motion.  But, original hep was not pain aggravating.  Thus, PT is warranted to facilitate left hip pain reduction, mobility progress to promote full functional progress.        Plan: Continue per plan of care.  Progress treatment as tolerated.       Precautions: Patient's PMHx is remarkable for HTN, Asthma and Osteoporosis.    Access Code: [email or web address]  URL: [email or web address]  Date: 2024  Prepared by: Ramana Gracia    Exercises  - ITB Stretch at Wall  - 1 x daily - 7 x weekly - 1 sets - 5 reps - 10 seconds hold  - Hip Flexor Stretch on Step  - 1 x daily - 7 x weekly - 1 sets - 5 reps - 10 seconds hold      Manuals            IASTM to left lateral hip and ITB region  NT NT                                                  Neuro Re-Ed                                                                                                        Ther Ex                          Nu step  8 min                        Standing ITB stretch:L Hep  instruction 10 sec x 5           Standing hip flexors stretch:B: Hep instruction 10 sec x 5                        LAQ:B:  2 x 10                        Supine hamstring stretch:B:  20 sec x 5           LTR:B:  10 sec x 5           Piriformis stretch:B:  20 sec x 5           DLS abdominal bracing  3 sec x 20           bridges  3 sec x 20           DLS abdominal bracing with hip flexion:B:  2 x 10           DLS abdominal bracing with slr flexion:B:  NT           SAQ:B:  NT           Supine hip abduction  NT           Hook lying hip adduction isometrics:B:  3 sec x 20           Hook lying hip abduction isometrics:B:  3 sec x 20                        Prone TKE:B:  NT           Prone hip extension:B:  NT add          Prone hip IR and ER:L  NT add          Prone knee flexion stretch:L:  NT add          Prone knee flexion arom:B:  NT                        Mini squats  NT           Lunges on foam:B:  NT           SLR x 3:B:  NT           Forward step ups:B  NT           Lateral step ups:B:  NT           Step downs:B  NT           Hip hiking:B:  NT                                                               Ther Activity                                       Gait Training                                       Modalities             MHP to bilateral hips sitting PRN  NT

## 2024-07-15 ENCOUNTER — OFFICE VISIT (OUTPATIENT)
Dept: PHYSICAL THERAPY | Age: 74
End: 2024-07-15
Payer: MEDICARE

## 2024-07-15 DIAGNOSIS — M76.12 PSOAS TENDONITIS OF LEFT SIDE: Primary | ICD-10-CM

## 2024-07-15 DIAGNOSIS — M76.32 IT BAND SYNDROME, LEFT: ICD-10-CM

## 2024-07-15 DIAGNOSIS — M70.62 TROCHANTERIC BURSITIS OF LEFT HIP: ICD-10-CM

## 2024-07-15 PROCEDURE — 97110 THERAPEUTIC EXERCISES: CPT | Performed by: PHYSICAL MEDICINE & REHABILITATION

## 2024-07-15 PROCEDURE — 97140 MANUAL THERAPY 1/> REGIONS: CPT | Performed by: PHYSICAL MEDICINE & REHABILITATION

## 2024-07-15 PROCEDURE — 97112 NEUROMUSCULAR REEDUCATION: CPT | Performed by: PHYSICAL MEDICINE & REHABILITATION

## 2024-07-15 NOTE — PROGRESS NOTES
"Daily Note     Today's date: 7/15/2024  Patient name: Jaye Jasso  : 1950  MRN: [id number]  Referring provider: Luz Sood PA-C  Dx:   Encounter Diagnosis     ICD-10-CM    1. Psoas tendonitis of left side  M76.12       2. Trochanteric bursitis of left hip  M70.62       3. It band syndrome, left  M76.32                      Subjective: Patient noting improved pain this date, took Advil today.     Objective: See treatment diary below    Assessment: Tolerated treatment well. Cueing throughout for form maintenance/proper core activation with good carryover. Initial soft tissue restrictions through psoas and over iliac crest respond well to manual therapy. Patient demonstrated fatigue post treatment and would benefit from continued PT. Continue as able nv.     Plan: Continue per plan of care.  Progress treatment as tolerated.       Precautions: Patient's PMHx is remarkable for HTN, Asthma and Osteoporosis.    Access Code: [email or web address]  URL: [email or web address]  Date: 2024  Prepared by: Ramana Gracia    Exercises  - ITB Stretch at Wall  - 1 x daily - 7 x weekly - 1 sets - 5 reps - 10 seconds hold  - Hip Flexor Stretch on Step  - 1 x daily - 7 x weekly - 1 sets - 5 reps - 10 seconds hold      Manuals 7/9 7/12 7/15          IASTM to left lateral hip and ITB region  NT NT              L hip PROM, ITB, LH             Psoas release, LH                       Neuro Re-Ed                                                                                                        Ther Ex   7/15                       Nu step  8 min 8'                       Standing ITB stretch:L Hep instruction 10 sec x 5           Standing hip flexors stretch:B: Hep instruction 10 sec x 5 5x10\"                       LAQ:B:  2 x 10                        Supine hamstring stretch:B:  20 sec x 5           LTR:B:  10 sec x 5           Piriformis stretch:B:  20 sec x 5           DLS abdominal bracing  3 sec x 20 " "20x3\"          bridges  3 sec x 20 20x3\"          DLS abdominal bracing with hip flexion:B:  2 x 10 2x10          DLS abdominal bracing with slr flexion:B:  NT 2x10 ea          SAQ:B:  NT           Supine hip abduction  NT           Hook lying hip adduction isometrics:B:  3 sec x 20           Hook lying hip abduction isometrics:B:  3 sec x 20                        Prone TKE:B:  NT           Prone hip extension:B:  NT 2x10 ea          Prone hip IR and ER:L  NT 15x ea          Prone knee flexion stretch:L:  NT Np, full range min stretch          Prone knee flexion arom:B:  NT                        Mini squats  NT           Lunges on foam:B:  NT           SLR x 3:B:  NT           Forward step ups:B  NT           Lateral step ups:B:  NT           Step downs:B  NT           Hip hiking:B:  NT                                                               Ther Activity                                       Gait Training                                       Modalities             MHP to bilateral hips sitting PRN  NT                             "

## 2024-07-16 ENCOUNTER — TELEPHONE (OUTPATIENT)
Dept: OBGYN CLINIC | Facility: CLINIC | Age: 74
End: 2024-07-16

## 2024-07-16 NOTE — TELEPHONE ENCOUNTER
----- Message from Henry GAR sent at 7/8/2024  2:04 PM EDT -----  Regarding: Injection Scheduling  Dr Dunlap is referring patient to Dr Chery for the following.    Start with left psoas tendon sheath injection, if persistent pain CSI intraarticular left hip injection    Please contact for scheduling    Thank you

## 2024-07-17 ENCOUNTER — OFFICE VISIT (OUTPATIENT)
Dept: PHYSICAL THERAPY | Age: 74
End: 2024-07-17
Payer: MEDICARE

## 2024-07-17 DIAGNOSIS — M76.12 PSOAS TENDONITIS OF LEFT SIDE: Primary | ICD-10-CM

## 2024-07-17 DIAGNOSIS — M70.62 TROCHANTERIC BURSITIS OF LEFT HIP: ICD-10-CM

## 2024-07-17 PROCEDURE — 97140 MANUAL THERAPY 1/> REGIONS: CPT

## 2024-07-17 PROCEDURE — 97110 THERAPEUTIC EXERCISES: CPT

## 2024-07-17 NOTE — PROGRESS NOTES
"Daily Note     Today's date: 2024  Patient name: Jaye Jasso  : 1950  MRN: [id number]  Referring provider: Luz Sood PA-C  Dx:   Encounter Diagnosis     ICD-10-CM    1. Psoas tendonitis of left side  M76.12       2. Trochanteric bursitis of left hip  M70.62                        Subjective: Patient note d\"I am starting to feel better, I can move my leg easier in bed\"       Objective: See treatment diary below    Assessment: Improved tolerance to exercises. Decreased L hip/LB and ITB pain. Progress as able     Plan: Continue per plan of care.  Progress treatment as tolerated.       Precautions: Patient's PMHx is remarkable for HTN, Asthma and Osteoporosis.    Access Code: [email or web address]  URL: [email or web address]  Date: 2024  Prepared by: Ramaan Gracia    Exercises  - ITB Stretch at Wall  - 1 x daily - 7 x weekly - 1 sets - 5 reps - 10 seconds hold  - Hip Flexor Stretch on Step  - 1 x daily - 7 x weekly - 1 sets - 5 reps - 10 seconds hold      Manuals 7/9 7/12 7/15 7/17         IASTM to left lateral hip and ITB region  NT NT  10 min             L hip PROM, ITB, LH NT            Psoas release, LH NT                      Neuro Re-Ed                                                                                                        Ther Ex   7/15                       Nu step  8 min 8' 10 min                       Standing ITB stretch:L Hep instruction 10 sec x 5  20SEC 5X          Standing hip flexors stretch:B: Hep instruction 10 sec x 5 5x10\" 20sec 5x                       LAQ:B:  2 x 10  20X                       Supine hamstring stretch:B:  20 sec x 5  20SEC 5X          LTR:B:  10 sec x 5  20X 3SEC          Piriformis stretch:B:  20 sec x 5  20SEC 5X          DLS abdominal bracing  3 sec x 20 20x3\" 20X 3SEC          bridges  3 sec x 20 20x3\" 20X 3SEC          DLS abdominal bracing with hip flexion:B:  2 x 10 2x10 20X          DLS abdominal bracing with slr flexion:B: "  NT 2x10 ea 20X          SAQ:B:  NT  NT         Supine hip abduction  NT  NT         Hook lying hip adduction isometrics:B:  3 sec x 20  20X 3SEC          Hook lying hip abduction isometrics:B:  3 sec x 20  20X 3SEC BTB                       Prone TKE:B:  NT  NT         Prone hip extension:B:  NT 2x10 ea NT         Prone hip IR and ER:L  NT 15x ea MAN          Prone knee flexion stretch:L:  NT Np, full range min stretch D/C          Prone knee B st with bolster under knee     NT         Prone knee flexion arom:B:  NT  D/C                       Mini squats  NT  NT         Lunges on foam:B:  NT  NT         SLR x 3:B:  NT  NT         Forward step ups:B  NT  NT         Lateral step ups:B:  NT  NT         Step downs:B  NT  NT         Hip hiking:B:  NT  NT                                                             Ther Activity                                       Gait Training                                       Modalities             MHP to bilateral hips sitting PRN  NT  NT

## 2024-07-23 ENCOUNTER — OFFICE VISIT (OUTPATIENT)
Dept: PHYSICAL THERAPY | Age: 74
End: 2024-07-23
Payer: MEDICARE

## 2024-07-23 DIAGNOSIS — M76.12 PSOAS TENDONITIS OF LEFT SIDE: Primary | ICD-10-CM

## 2024-07-23 DIAGNOSIS — M70.62 TROCHANTERIC BURSITIS OF LEFT HIP: ICD-10-CM

## 2024-07-23 DIAGNOSIS — M76.32 IT BAND SYNDROME, LEFT: ICD-10-CM

## 2024-07-23 DIAGNOSIS — M16.12 PRIMARY OSTEOARTHRITIS OF LEFT HIP: ICD-10-CM

## 2024-07-23 PROCEDURE — 97110 THERAPEUTIC EXERCISES: CPT | Performed by: PHYSICAL THERAPIST

## 2024-07-23 PROCEDURE — 97140 MANUAL THERAPY 1/> REGIONS: CPT | Performed by: PHYSICAL THERAPIST

## 2024-07-23 NOTE — PROGRESS NOTES
Daily Note     Today's date: 2024  Patient name: Jaye Jasso  : 1950  MRN: [id number]  Referring provider: Luz Sood PA-C  Dx:   Encounter Diagnosis     ICD-10-CM    1. Psoas tendonitis of left side  M76.12       2. Trochanteric bursitis of left hip  M70.62       3. It band syndrome, left  M76.32       4. Primary osteoarthritis of left hip  M16.12                      Subjective: Pt notes she is feeling some pain to her groin with some exercises and wants to make sure she is doing it correctly.  Patient noted she continues to have left hip pain with standing based functional activities.      Objective: See treatment diary below      Assessment: Tolerated treatment well. Patient demonstrated fatigue post treatment and would benefit from continued PT.  Patient exhibits short term left lateral hip pain reduction and mobility progress since use of manual therapy techniques. But, she lacks long term pain reduction and functional mobility.  Thus, PT is warranted to facilitate left hip pain reduction, mobility progress to promote full functional progress.          Plan: Continue per plan of care.  Progress treatment as tolerated.       Precautions: Patient's PMHx is remarkable for HTN, Asthma and Osteoporosis.    Access Code: [email or web address]  URL: [email or web address]  Date: 2024  Prepared by: Ramana Gracia    Exercises  - ITB Stretch at Wall  - 1 x daily - 7 x weekly - 1 sets - 5 reps - 10 seconds hold  - Hip Flexor Stretch on Step  - 1 x daily - 7 x weekly - 1 sets - 5 reps - 10 seconds hold      Manuals 7/9 7/12 7/15 7/17 723        IASTM to left lateral hip and ITB region  NT NT  10 min  10 min           L hip PROM, ITB, LH NT            Psoas release, LH NT                      Neuro Re-Ed                                                                                                        Ther Ex   7/15                       Nu step  8 min 8' 10 min  10'                    "  Standing ITB stretch:L Hep instruction 10 sec x 5  20SEC 5X  20 sec x 5        Standing hip flexors stretch:B: Hep instruction 10 sec x 5 5x10\" 20sec 5x  20 sec x 5                     LAQ:B:  2 x 10  20X  NT                     Supine hamstring stretch:B:  20 sec x 5  20SEC 5X  NT        LTR:B:  10 sec x 5  20X 3SEC  NT        Piriformis stretch:B:  20 sec x 5  20SEC 5X  20 sec x 5        DLS abdominal bracing  3 sec x 20 20x3\" 20X 3SEC  NT        bridges  3 sec x 20 20x3\" 20X 3SEC  3 sec x 20        DLS abdominal bracing with hip flexion:B:  2 x 10 2x10 20X  2 x 10        DLS abdominal bracing with slr flexion:B:  NT 2x10 ea 20X  2 x 10        SAQ:B:  NT  NT NT        Supine hip abduction  NT  NT NT        Hook lying hip adduction isometrics:B:  3 sec x 20  20X 3SEC  NT        Hook lying hip abduction isometrics:B:  3 sec x 20  20X 3SEC BTB  NT                     Prone TKE:B:  NT  NT NT        Prone hip extension:B:  NT 2x10 ea NT NT        Prone hip IR and ER:L  NT 15x ea MAN  NT        Prone knee flexion stretch:L:  NT Np, full range min stretch D/C  NT        Prone knee B st with bolster under knee     NT NT        Prone knee flexion arom:B:  NT  D/C  NT                     Mini squats  NT  NT         Lunges on foam:B:  NT  NT         SLR x 3:B:  NT  NT         Forward step ups:B  NT  NT         Lateral step ups:B:  NT  NT         Step downs:B  NT  NT         Hip hiking:B:  NT  NT                                                             Ther Activity                                       Gait Training                                       Modalities             MHP to bilateral hips sitting PRN  NT  NT                             "

## 2024-07-25 ENCOUNTER — APPOINTMENT (OUTPATIENT)
Dept: PHYSICAL THERAPY | Age: 74
End: 2024-07-25
Payer: MEDICARE

## 2024-07-30 ENCOUNTER — OFFICE VISIT (OUTPATIENT)
Dept: PHYSICAL THERAPY | Age: 74
End: 2024-07-30
Payer: MEDICARE

## 2024-07-30 DIAGNOSIS — M76.12 PSOAS TENDONITIS OF LEFT SIDE: Primary | ICD-10-CM

## 2024-07-30 DIAGNOSIS — M16.12 PRIMARY OSTEOARTHRITIS OF LEFT HIP: ICD-10-CM

## 2024-07-30 DIAGNOSIS — M76.32 IT BAND SYNDROME, LEFT: ICD-10-CM

## 2024-07-30 DIAGNOSIS — M70.62 TROCHANTERIC BURSITIS OF LEFT HIP: ICD-10-CM

## 2024-07-30 PROCEDURE — 97110 THERAPEUTIC EXERCISES: CPT | Performed by: PHYSICAL THERAPIST

## 2024-07-30 PROCEDURE — 97140 MANUAL THERAPY 1/> REGIONS: CPT | Performed by: PHYSICAL THERAPIST

## 2024-07-30 NOTE — PROGRESS NOTES
Daily Note     Today's date: 2024  Patient name: Jaye Jasso  : 1950  MRN: [id number]  Referring provider: Luz Sood PA-C  Dx:   Encounter Diagnosis     ICD-10-CM    1. Psoas tendonitis of left side  M76.12       2. Trochanteric bursitis of left hip  M70.62       3. It band syndrome, left  M76.32       4. Primary osteoarthritis of left hip  M16.12                      Subjective: Patient reported she has left left hip pain and more mobility since onset of PT.  Patient noted minimal pain to her groin and lateral hip with functional standing based activities.        Objective: See treatment diary below.      Assessment: Tolerated treatment well. Patient demonstrated fatigue post treatment and would benefit from continued PT.  Patient presents with long term left hip mobility progress and short term left lateral hip pain reduction since use of manual therapy techniques. Due to improvement in left hip mobility and control with standing activities she has exhibited an increase in standing functional progress.  But, she lacks long term pain reduction and functional mobility during self and work based activities.  Thus, PT is warranted to facilitate left hip pain reduction, mobility progress to promote full functional progress.          Plan: Continue per plan of care.  Progress treatment as tolerated.       Precautions: Patient's PMHx is remarkable for HTN, Asthma and Osteoporosis.    Access Code: [email or web address]  URL: [email or web address]  Date: 2024  Prepared by: Ramana Gracia    Exercises  - ITB Stretch at Wall  - 1 x daily - 7 x weekly - 1 sets - 5 reps - 10 seconds hold  - Hip Flexor Stretch on Step  - 1 x daily - 7 x weekly - 1 sets - 5 reps - 10 seconds hold      Manuals 7/9 7/12 7/15 7/17 723        IASTM to left lateral hip and ITB region  NT NT  10 min  10 min 10 min          L hip PROM, ITB, LH NT            Psoas release, LH NT                      Neuro Re-Ed     "                                                                                                    Ther Ex   7/15                       Nu step  8 min 8' 10 min  10' 10 min                    Standing ITB stretch:L Hep instruction 10 sec x 5  20SEC 5X  20 sec x 5 20 sec x 5       Standing hip flexors stretch:B: Hep instruction 10 sec x 5 5x10\" 20sec 5x  20 sec x 5 20 sec x 5                    LAQ:B:  2 x 10  20X  NT                     Supine hamstring stretch:B:  20 sec x 5  20SEC 5X  NT 20 sec x 5       LTR:B:  10 sec x 5  20X 3SEC  NT 10 sec x 5       Piriformis stretch:B:  20 sec x 5  20SEC 5X  20 sec x 5 20 sec x 5       DLS abdominal bracing  3 sec x 20 20x3\" 20X 3SEC  NT NT       bridges  3 sec x 20 20x3\" 20X 3SEC  3 sec x 20 3 sec x 20       DLS abdominal bracing with hip flexion:B:  2 x 10 2x10 20X  2 x 10 2 x 10       DLS abdominal bracing with slr flexion:B:  NT 2x10 ea 20X  2 x 10 2 x 10       SAQ:B:  NT  NT NT NT       Supine hip abduction  NT  NT NT NT       Hook lying hip adduction isometrics:B:  3 sec x 20  20X 3SEC  NT NT       Hook lying hip abduction isometrics:B:  3 sec x 20  20X 3SEC BTB  NT NT                    Prone TKE:B:  NT  NT NT NT       Prone hip extension:B:  NT 2x10 ea NT NT NT       Prone hip IR and ER:L  NT 15x ea MAN  NT NT       Prone knee flexion stretch:L:  NT Np, full range min stretch D/C  NT NT       Prone knee B st with bolster under knee     NT NT NT       Prone knee flexion arom:B:  NT  D/C  NT D/C                    Mini squats  NT  NT  NT       Lunges on foam:B:  NT  NT  NT       SLR x 3:B:  NT  NT  NT       Forward step ups:B  NT  NT  NT       Lateral step ups:B:  NT  NT  NT       Step downs:B  NT  NT  NT       Hip hiking:B:  NT  NT  NT                                                           Ther Activity                                       Gait Training                                       Modalities             MHP to bilateral hips sitting PRN  NT  NT          "

## 2024-08-01 ENCOUNTER — OFFICE VISIT (OUTPATIENT)
Dept: PHYSICAL THERAPY | Age: 74
End: 2024-08-01
Payer: MEDICARE

## 2024-08-01 DIAGNOSIS — M70.62 TROCHANTERIC BURSITIS OF LEFT HIP: ICD-10-CM

## 2024-08-01 DIAGNOSIS — M76.32 IT BAND SYNDROME, LEFT: ICD-10-CM

## 2024-08-01 DIAGNOSIS — M16.12 PRIMARY OSTEOARTHRITIS OF LEFT HIP: ICD-10-CM

## 2024-08-01 DIAGNOSIS — M76.12 PSOAS TENDONITIS OF LEFT SIDE: Primary | ICD-10-CM

## 2024-08-01 PROCEDURE — 97140 MANUAL THERAPY 1/> REGIONS: CPT | Performed by: PHYSICAL THERAPIST

## 2024-08-01 PROCEDURE — 97110 THERAPEUTIC EXERCISES: CPT | Performed by: PHYSICAL THERAPIST

## 2024-08-01 NOTE — PROGRESS NOTES
Daily Note     Today's date: 2024  Patient name: Jaye Jasso  : 1950  MRN: [id number]  Referring provider: Luz Sood PA-C  Dx:   Encounter Diagnosis     ICD-10-CM    1. Psoas tendonitis of left side  M76.12       2. Trochanteric bursitis of left hip  M70.62       3. It band syndrome, left  M76.32       4. Primary osteoarthritis of left hip  M16.12                      Subjective: Patient reported she has had bilateral hip and lumbar spine region pain aggravation since yesterday, which she feels is was caused by gait changes due to right foot callous.  Patient reported her bilateral lateral hip and lumbar spine region is at 7 of 10. Patient noted all standing based functional activities remain limited by pain aggravation.        Objective: See treatment diary below.      Assessment: Tolerated treatment well. Patient demonstrated fatigue post treatment and would benefit from continued PT.  Patient exhibits short term pain and mobility progress after combination of self stretching and IASTM techniques which positively affect the ITB, piriformis, distal quadricepts and TFL musculature.  Patient exhibits short term gait and transfer progress, but she lacks long term pain reduction and functional mobility during self and work based activities.  Thus, PT is warranted to facilitate left hip pain reduction, mobility progress to promote full functional progress.          Plan: Continue per plan of care.  Progress treatment as tolerated.       Precautions: Patient's PMHx is remarkable for HTN, Asthma and Osteoporosis.    Access Code: [email or web address]  URL: [email or web address]  Date: 2024  Prepared by: Ramana Gracia    Exercises  - ITB Stretch at Wall  - 1 x daily - 7 x weekly - 1 sets - 5 reps - 10 seconds hold  - Hip Flexor Stretch on Step  - 1 x daily - 7 x weekly - 1 sets - 5 reps - 10 seconds hold      Manuals 7/9 7/12 7/15 7/17 723       IASTM to left lateral hip and ITB  "region  NT NT  10 min  10 min 10 min 10 min         L hip PROM, ITB, LH NT            Psoas release, LH NT                      Neuro Re-Ed                                                                                                        Ther Ex   7/15                       Nu step  8 min 8' 10 min  10' 10 min 8 min                   Standing ITB stretch:L Hep instruction 10 sec x 5  20SEC 5X  20 sec x 5 20 sec x 5 20 sec x 5      Standing hip flexors stretch:B: Hep instruction 10 sec x 5 5x10\" 20sec 5x  20 sec x 5 20 sec x 5 20 sec x 5                   LAQ:B:  2 x 10  20X  NT                     Supine hamstring stretch:B:  20 sec x 5  20SEC 5X  NT 20 sec x 5       LTR:B:  10 sec x 5  20X 3SEC  NT 10 sec x 5 10 sec x 5      Piriformis stretch:B:  20 sec x 5  20SEC 5X  20 sec x 5 20 sec x 5 20 sec x 5      DLS abdominal bracing  3 sec x 20 20x3\" 20X 3SEC  NT NT NT      bridges  3 sec x 20 20x3\" 20X 3SEC  3 sec x 20 3 sec x 20       DLS abdominal bracing with hip flexion:B:  2 x 10 2x10 20X  2 x 10 2 x 10 NT      DLS abdominal bracing with slr flexion:B:  NT 2x10 ea 20X  2 x 10 2 x 10 NT      SAQ:B:  NT  NT NT NT NT      Supine hip abduction  NT  NT NT NT NT      Hook lying hip adduction isometrics:B:  3 sec x 20  20X 3SEC  NT NT NT      Hook lying hip abduction isometrics:B:  3 sec x 20  20X 3SEC BTB  NT NT NT                   Prone TKE:B:  NT  NT NT NT NT      Prone hip extension:B:  NT 2x10 ea NT NT NT NT      Prone hip IR and ER:L  NT 15x ea MAN  NT NT NT      Prone knee flexion stretch:L:  NT Np, full range min stretch D/C  NT NT NT      Prone knee B st with bolster under knee     NT NT NT NT      Prone knee flexion arom:B:  NT  D/C  NT D/C D/C                   Mini squats  NT  NT  NT NT      Lunges on foam:B:  NT  NT  NT NT      SLR x 3:B:  NT  NT  NT NT      Forward step ups:B  NT  NT  NT NT      Lateral step ups:B:  NT  NT  NT NT      Step downs:B  NT  NT  NT NT      Hip hiking:B:  NT  NT  NT NT       "                                                    Ther Activity                                       Gait Training                                       Modalities             MHP to bilateral hips sitting PRN  NT  NT

## 2024-08-06 ENCOUNTER — OFFICE VISIT (OUTPATIENT)
Dept: PHYSICAL THERAPY | Age: 74
End: 2024-08-06
Payer: MEDICARE

## 2024-08-06 DIAGNOSIS — M70.62 TROCHANTERIC BURSITIS OF LEFT HIP: ICD-10-CM

## 2024-08-06 DIAGNOSIS — M76.12 PSOAS TENDONITIS OF LEFT SIDE: Primary | ICD-10-CM

## 2024-08-06 DIAGNOSIS — M16.12 PRIMARY OSTEOARTHRITIS OF LEFT HIP: ICD-10-CM

## 2024-08-06 DIAGNOSIS — M76.32 IT BAND SYNDROME, LEFT: ICD-10-CM

## 2024-08-06 PROCEDURE — 97140 MANUAL THERAPY 1/> REGIONS: CPT | Performed by: PHYSICAL THERAPIST

## 2024-08-06 PROCEDURE — 97110 THERAPEUTIC EXERCISES: CPT | Performed by: PHYSICAL THERAPIST

## 2024-08-06 NOTE — PROGRESS NOTES
PT Evaluation  / PT Reassessment    Today's date: 2024  Patient name: Jaye Jasso  : 1950  MRN: [id number]  Referring provider: Luz Sood PA-C  Dx:   Encounter Diagnosis     ICD-10-CM    1. Psoas tendonitis of left side  M76.12       2. Trochanteric bursitis of left hip  M70.62       3. It band syndrome, left  M76.32       4. Primary osteoarthritis of left hip  M16.12                      Assessment  Impairments: abnormal gait, abnormal or restricted ROM, abnormal movement, activity intolerance, impaired physical strength, lacks appropriate home exercise program and pain with function    Assessment details: PT Reassessment: 24.  Patient reported the following progress since onset of PT: increase in left hip mobility, increase in walking balance and house hold chores.  But, she noted the following deficits that still persists: left lateral hip pain that radiates into the left groin.  Patient noted she will follow up with her Orthopedist, Dr. Dunlap, regarding a left hip injection.      PT IE: 24  Patient reported she has had left lateral hip pain that is chronic that has ebbs and flows that limit sleep, walking, standing, transfers and stair climbing activities.  Patient noted prolonged sitting and then standing is limited by severe pain aggravation that she needs several steps to improve her gait to normal pace.  Patient noted she has left groin and lumbar spine pain.  Patient noted she has left > right lateral hip pain.  Patient noted getting in and out of the car is pain aggravating.  Patient noted left lateral hip pain will radiate into the left lateral knee and left groin pain will radiate into the middle of the adductor muscle.  Patient noted she has had several episodes of the past 6 weeks of left knee buckle without falling and she denies falls.  Patient noted she has lumbar spine DDD.  Patient noted she had a left lateral hip ITB trochanteric bursa.  Patient noted noted has  bilateral hip weakness persists that limits standing based functional activities.  Patient noted sleep is deprived as well.  Understanding of Dx/Px/POC: excellent     Prognosis: good  Prognosis details: Patient is a 73 y.o. year old female seen for outpatient PT re-evaluation with a Psoas tendonitis of left side [M76.12] . Patient  presents with the following progress since onset of PT:  decrease in left hip and groin pain, increase in left lower extremity mobility and strength, gait improvements and functional progress. Patient presents to PT reassessment with the following problems, concerns, deficits and impairments: left hip and groin pain, decreased lumbar spine range of motion, decreased left hip strength, gait and stair dysfunctions, transfer dysfunction, + TTP, + special tests, functional limitations and decreased tolerance to activity.  Patient would benefit from skilled PT services under the following PT treatment plan to address the above noted deficits: therapeutic exercises and activities to facilitate lumbar spine mobility and bilateral lower extremity mobility and strength, modalities, manual therapy techniques, IASTM techniques, Kinesio taping techniques, gait and stair training, transfer training, traction, DLS and core strengthening, and a hep.  Thank you for the referral.     Goals  Short Term goals - 4 weeks  1.  Patient will be independent HEP. MET.  2.  Patient will report a 25 - 50% decrease in pain complaints.  MET.  3.  Increase strength 1/2 grade.  MET.  4.  Increase ROM 5-10 degrees.  MET.    Long Term goals - 8 weeks  1.  Patient will report elimination of pain complaints.  Partially MET.  2.  Patient will return to all work related activities without restriction.Partially MET.  3.  Patient will return to all recreational activities without restriction.Partially MET.  4.  ROM WFL.Partially MET.  5.  Strength 5/5.Partially MET.  6.  Patient will report ability to perform all transfers  without left hip and lumbar spine symptom aggravation or limitations. Partially MET.  7.  Patient will report ability to increase left side lying by > 10-15 minutes prior to left hip and lumbar spine symptom aggravation or limitations.  Partially MET.  8.  Patient will report sleep improved by > 20-30 minutes prior to left hip and lumbar spine symptom aggravation or limitations.Partially MET.  9.  Patient will report ability to improve her static standing activities involving house hold and work chores and activities without left hip and lumbar spine symptom aggravation or limitations.Partially MET.  10.  Patient will report ability to perform stair climbing without left hip and lumbar spine symptom aggravation or limitations.Partially MET.  11.  Patient will report ability to walk for an additional 10-15 minutes prior to left hip and lumbar spine symptom aggravation or limitations.Partially MET.    Plan  Patient would benefit from: skilled physical therapy and PT eval  Planned modality interventions: low level laser therapy, manual electrical stimulation, TENS, thermotherapy: hydrocollator packs, ultrasound, unattended electrical stimulation, cryotherapy and electrical stimulation/Russian stimulation    Planned therapy interventions: IASTM, joint mobilization, kinesiology taping, manual therapy, massage, balance, balance/weight bearing training, neuromuscular re-education, postural training, body mechanics training, self care, compression, strengthening, stretching, therapeutic activities, therapeutic exercise, therapeutic training, transfer training, flexibility, functional ROM exercises, gait training, graded activity, graded exercise, graded motor, home exercise program, IADL retraining and aquatic therapy    Frequency: 2x week  Duration in weeks: 8  Treatment plan discussed with: patient        Subjective Evaluation    History of Present Illness  Mechanism of injury: Patient's PMHx is remarkable for HTN, Asthma  and Osteoporosis.      LEFT HIP     INDICATION:   Pain in left hip.      COMPARISON:  None.     VIEWS:  XR HIP/PELV 2-3 VWS LEFT  W PELVIS IF PERFORMED   Images: 3     FINDINGS:     There is no acute fracture or dislocation.     Mild left hip osteoarthritis is seen.     No lytic or blastic osseous lesion.     Soft tissues are unremarkable.     Degenerative changes pubic symphysis and visualized lower lumbar spine.     IMPRESSION:        No acute osseous abnormality.     Degenerative changes as described.       Patient Goals  Patient goals for therapy: decreased pain, increased motion, improved balance, increased strength, independence with ADLs/IADLs and return to sport/leisure activities  Patient goal: to move better with less pain  Pain  At best pain rating: 3  At worst pain ratin  Location: Left hip          Objective     Tenderness     Additional Tenderness Details  Patient is + TTP at moderate to severe levels at left greater trochanter region, as well as left ITB and lateral knee at minimal to moderate levels.    Active Range of Motion     Lumbar   Flexion: 100 degrees   Extension: 32 degrees   Left rotation: 60 degrees   Right rotation: 66 degrees   Left Hip   Flexion: 120 degrees with pain  Abduction: 22 degrees with pain  External rotation (90/90): 20 degrees with pain  Internal rotation (90/90): 24 degrees with pain    Right Hip   Flexion: 128 degrees   Abduction: 34 degrees   External rotation (90/90): 30 degrees   Internal rotation (90/90): 50 degrees   Left Knee   Flexion: 132 degrees   Extension: -2 degrees   Extensor la degrees with pain    Right Knee   Flexion: 130 degrees   Extension: 0 degrees   Extensor la degrees     Strength/Myotome Testing     Left Hip   Planes of Motion   Flexion: 4  Extension: 4  Abduction: 4  Adduction: 4+  External rotation: 4  Internal rotation: 4-    Right Hip   Planes of Motion   Flexion: 4  Extension: 4  Abduction: 4  Adduction: 4  External rotation:  "4  Internal rotation: 4    Left Knee   Flexion: 3+  Extension: 3+    Right Knee   Flexion: 4-  Extension: 4-    Left Ankle/Foot   Dorsiflexion: 4  Plantar flexion: 5    Right Ankle/Foot   Dorsiflexion: 4  Plantar flexion: 5    Tests     Left Hip   Positive ELODIA and piriformis.   Negative scour.   Inocente: Positive.     Ambulation     Ambulation: Level Surfaces   Ambulation without assistive device: independent    Additional Level Surfaces Ambulation Details  Patient ambulates with antalgic gait pattern of decrease in left stance and right swing phase.    Ambulation: Stairs   Ascend stairs: independent  Pattern: non-reciprocal  Railings: two rails  Descend stairs: independent  Pattern: non-reciprocal  Railings: two rails             Precautions: Patient's PMHx is remarkable for HTN, Asthma and Osteoporosis.    Access Code: [email or web address]  URL: [email or web address]  Date: 07/09/2024  Prepared by: Ramana Gracia    Exercises  - ITB Stretch at Wall  - 1 x daily - 7 x weekly - 1 sets - 5 reps - 10 seconds hold  - Hip Flexor Stretch on Step  - 1 x daily - 7 x weekly - 1 sets - 5 reps - 10 seconds hold      Manuals 7/9 7/12 7/15 7/17 723 7/30 8/1 8/6     IASTM to left lateral hip and ITB region  NT NT  10 min  10 min 10 min 10 min 10 min        L hip PROM, ITB, LH NT            Psoas release, LH NT                      Neuro Re-Ed                                                                                                        Ther Ex   7/15                       Nu step  8 min 8' 10 min  10' 10 min 8 min 10 min                  Standing ITB stretch:L Hep instruction 10 sec x 5  20SEC 5X  20 sec x 5 20 sec x 5 20 sec x 5 20 sec  x 5     Standing hip flexors stretch:B: Hep instruction 10 sec x 5 5x10\" 20sec 5x  20 sec x 5 20 sec x 5 20 sec x 5 20 sec x 5                  LAQ:B:  2 x 10  20X  NT                     Supine hamstring stretch:B:  20 sec x 5  20SEC 5X  NT 20 sec x 5 20 sec x 5 20 sec x 5     LTR:B: " " 10 sec x 5  20X 3SEC  NT 10 sec x 5 10 sec x 5 10 sec x 5     Piriformis stretch:B:  20 sec x 5  20SEC 5X  20 sec x 5 20 sec x 5 20 sec x 5 20 sec x 5     DLS abdominal bracing  3 sec x 20 20x3\" 20X 3SEC  NT NT NT NT     bridges  3 sec x 20 20x3\" 20X 3SEC  3 sec x 20 3 sec x 20       DLS abdominal bracing with hip flexion:B:  2 x 10 2x10 20X  2 x 10 2 x 10 NT      DLS abdominal bracing with slr flexion:B:  NT 2x10 ea 20X  2 x 10 2 x 10 NT      SAQ:B:  NT  NT NT NT NT      Supine hip abduction  NT  NT NT NT NT      Hook lying hip adduction isometrics:B:  3 sec x 20  20X 3SEC  NT NT NT      Hook lying hip abduction isometrics:B:  3 sec x 20  20X 3SEC BTB  NT NT NT                   Prone TKE:B:  NT  NT NT NT NT      Prone hip extension:B:  NT 2x10 ea NT NT NT NT      Prone hip IR and ER:L  NT 15x ea MAN  NT NT NT      Prone knee flexion stretch:L:  NT Np, full range min stretch D/C  NT NT NT      Prone knee B st with bolster under knee     NT NT NT NT      Prone knee flexion arom:B:  NT  D/C  NT D/C D/C                   Mini squats  NT  NT  NT NT      Lunges on foam:B:  NT  NT  NT NT      SLR x 3:B:  NT  NT  NT NT      Forward step ups:B  NT  NT  NT NT      Lateral step ups:B:  NT  NT  NT NT      Step downs:B  NT  NT  NT NT      Hip hiking:B:  NT  NT  NT NT                                                          Ther Activity                                       Gait Training                                       Modalities             MHP to bilateral hips sitting PRN  NT  NT                            "

## 2024-08-08 ENCOUNTER — TELEPHONE (OUTPATIENT)
Dept: OBGYN CLINIC | Facility: HOSPITAL | Age: 74
End: 2024-08-08

## 2024-08-09 ENCOUNTER — OFFICE VISIT (OUTPATIENT)
Dept: PHYSICAL THERAPY | Age: 74
End: 2024-08-09
Payer: MEDICARE

## 2024-08-09 DIAGNOSIS — M76.32 IT BAND SYNDROME, LEFT: ICD-10-CM

## 2024-08-09 DIAGNOSIS — M76.12 PSOAS TENDONITIS OF LEFT SIDE: Primary | ICD-10-CM

## 2024-08-09 DIAGNOSIS — M70.62 TROCHANTERIC BURSITIS OF LEFT HIP: ICD-10-CM

## 2024-08-09 DIAGNOSIS — M16.12 PRIMARY OSTEOARTHRITIS OF LEFT HIP: ICD-10-CM

## 2024-08-09 PROCEDURE — 97110 THERAPEUTIC EXERCISES: CPT | Performed by: PHYSICAL THERAPIST

## 2024-08-09 PROCEDURE — 97140 MANUAL THERAPY 1/> REGIONS: CPT | Performed by: PHYSICAL THERAPIST

## 2024-08-09 NOTE — PROGRESS NOTES
Daily Note     Today's date: 2024  Patient name: Jaye Jasso  : 1950  MRN: [id number]  Referring provider: Luz Sood PA-C  Dx:   Encounter Diagnosis     ICD-10-CM    1. Psoas tendonitis of left side  M76.12       2. Trochanteric bursitis of left hip  M70.62       3. It band syndrome, left  M76.32       4. Primary osteoarthritis of left hip  M16.12                      Subjective: Patient reported she is getting an left hip injection on 24.  Patient noted she continues to get bouts of left hip and groin pain that can be severe.        Objective: See treatment diary below.      Assessment: Patient presents with short term left hip pain reduction with combination of open kinetic chain activities, self stretching and STM / IASTM techniques.  Patient exhibits distal ITB muscle spasm / guarding / tightness and anterior left greater trochanter regions as TTP and guard which she noted she gets short term pain relief with IASTM techniques.  But, she lacks long term left hip and groin pain reduction that causes standing based functional deficits.  Thus, PT is warranted to facilitate left hip pain reduction, mobility progress to promote full functional progress.          Plan: Continue per plan of care.  Progress treatment as tolerated.    PT to resume after left hip injection.     Precautions: Patient's PMHx is remarkable for HTN, Asthma and Osteoporosis.    Access Code: [email or web address]  URL: [email or web address]  Date: 2024  Prepared by: Ramana Gracia    Exercises  - ITB Stretch at Wall  - 1 x daily - 7 x weekly - 1 sets - 5 reps - 10 seconds hold  - Hip Flexor Stretch on Step  - 1 x daily - 7 x weekly - 1 sets - 5 reps - 10 seconds hold      Manuals 7/9 7/12 7/15 7/17 723     IASTM to left lateral hip and ITB region  NT NT  10 min  10 min 10 min 10 min 10 min 10 min       L hip PROM, ITB, LH NT            Psoas release, LH NT                      Neuro Re-Ed     "                                                                                                    Ther Ex   7/15                       Nu step  8 min 8' 10 min  10' 10 min 8 min 10 min 10 min                 Standing ITB stretch:L Hep instruction 10 sec x 5  20SEC 5X  20 sec x 5 20 sec x 5 20 sec x 5 20 sec  x 5 20 sec x 5    Standing hip flexors stretch:B: Hep instruction 10 sec x 5 5x10\" 20sec 5x  20 sec x 5 20 sec x 5 20 sec x 5 20 sec x 5 20 sec x 5                 LAQ:B:  2 x 10  20X  NT                     Supine hamstring stretch:B:  20 sec x 5  20SEC 5X  NT 20 sec x 5 20 sec x 5 20 sec x 5 20 sec x 5    LTR:B:  10 sec x 5  20X 3SEC  NT 10 sec x 5 10 sec x 5 10 sec x 5 10 sec x 5    Piriformis stretch:B:  20 sec x 5  20SEC 5X  20 sec x 5 20 sec x 5 20 sec x 5 20 sec x 5 20 sec x 5    DLS abdominal bracing  3 sec x 20 20x3\" 20X 3SEC  NT NT NT NT     bridges  3 sec x 20 20x3\" 20X 3SEC  3 sec x 20 3 sec x 20   2 x 10    DLS abdominal bracing with hip flexion:B:  2 x 10 2x10 20X  2 x 10 2 x 10 NT  2 x 10    DLS abdominal bracing with slr flexion:B:  NT 2x10 ea 20X  2 x 10 2 x 10 NT  2 x 10    SAQ:B:  NT  NT NT NT NT      Supine hip abduction  NT  NT NT NT NT      Hook lying hip adduction isometrics:B:  3 sec x 20  20X 3SEC  NT NT NT      Hook lying hip abduction isometrics:B:  3 sec x 20  20X 3SEC BTB  NT NT NT                   Prone TKE:B:  NT  NT NT NT NT  NT    Prone hip extension:B:  NT 2x10 ea NT NT NT NT  NT    Prone hip IR and ER:L  NT 15x ea MAN  NT NT NT  NT    Prone knee flexion stretch:L:  NT Np, full range min stretch D/C  NT NT NT  D/C    Prone knee B st with bolster under knee     NT NT NT NT  D/C    Prone knee flexion arom:B:  NT  D/C  NT D/C D/C  D/C    Side stepping and tandem ambulation         NT add   Mini squats  NT  NT  NT NT  NT add   Lunges on foam:B:  NT  NT  NT NT  NT    SLR x 3:B:  NT  NT  NT NT  NT    Forward step ups:B  NT  NT  NT NT  NT    Lateral step ups:B:  NT  NT  NT NT  NT  "   Step downs:B  NT  NT  NT NT  NT    Hip hiking:B:  NT  NT  NT NT  NT add   SLS and tandem stance:B:         NT add                                          Ther Activity                                       Gait Training                                       Modalities             MHP to bilateral hips sitting PRN  NT  NT

## 2024-08-12 ENCOUNTER — OFFICE VISIT (OUTPATIENT)
Dept: PHYSICAL THERAPY | Age: 74
End: 2024-08-12
Payer: MEDICARE

## 2024-08-12 DIAGNOSIS — M76.12 PSOAS TENDONITIS OF LEFT SIDE: Primary | ICD-10-CM

## 2024-08-12 DIAGNOSIS — M70.62 TROCHANTERIC BURSITIS OF LEFT HIP: ICD-10-CM

## 2024-08-12 DIAGNOSIS — M76.32 IT BAND SYNDROME, LEFT: ICD-10-CM

## 2024-08-12 DIAGNOSIS — M16.12 PRIMARY OSTEOARTHRITIS OF LEFT HIP: ICD-10-CM

## 2024-08-12 PROCEDURE — 97110 THERAPEUTIC EXERCISES: CPT | Performed by: PHYSICAL THERAPIST

## 2024-08-12 PROCEDURE — 97140 MANUAL THERAPY 1/> REGIONS: CPT | Performed by: PHYSICAL THERAPIST

## 2024-08-12 NOTE — PROGRESS NOTES
Daily Note     Today's date: 2024  Patient name: Jaye Jasso  : 1950  MRN: [id number]  Referring provider: Luz Sood PA-C  Dx:   Encounter Diagnosis     ICD-10-CM    1. Psoas tendonitis of left side  M76.12       2. Trochanteric bursitis of left hip  M70.62       3. It band syndrome, left  M76.32       4. Primary osteoarthritis of left hip  M16.12                      Subjective: Patient reported she is getting an left hip injection on 24.  Patient reported she has left ankle pain today that caused her left leg to buckle without falling.      Objective: See treatment diary below.      Assessment: Patient exhibits an increase in TTP and muscle spasms / guarding along the < piriformis, ITB and TFL connective tissues that were more painful during IASTM and open kinetic chain activities.  Patient exhibits pain aggravation and soft tissue symptom presentation causing standing and walking based deficits listed above.  But, she lacks long term left hip and groin pain reduction that causes standing based functional deficits.  Thus, PT is warranted to facilitate left hip pain reduction, mobility progress to promote full functional progress.          Plan: Continue per plan of care.  Progress treatment as tolerated.    PT to resume after left hip injection.     Precautions: Patient's PMHx is remarkable for HTN, Asthma and Osteoporosis.    Access Code: [email or web address]  URL: [email or web address]  Date: 2024  Prepared by: Ramana Gracia    Exercises  - ITB Stretch at Wall  - 1 x daily - 7 x weekly - 1 sets - 5 reps - 10 seconds hold  - Hip Flexor Stretch on Step  - 1 x daily - 7 x weekly - 1 sets - 5 reps - 10 seconds hold      Manuals 7/9 7/12 7/15 7/17 723    IASTM to left lateral hip and ITB region  NT NT  10 min  10 min 10 min 10 min 10 min 10 min 10 min      L hip PROM, ITB, LH NT            Psoas release, LH NT                      Neuro Re-Ed            "                                                                                             Ther Ex   7/15                       Nu step  8 min 8' 10 min  10' 10 min 8 min 10 min 10 min 10 min                Standing ITB stretch:L Hep instruction 10 sec x 5  20SEC 5X  20 sec x 5 20 sec x 5 20 sec x 5 20 sec  x 5 20 sec x 5 20 sec x 5   Standing hip flexors stretch:B: Hep instruction 10 sec x 5 5x10\" 20sec 5x  20 sec x 5 20 sec x 5 20 sec x 5 20 sec x 5 20 sec x 5 20 sec x 5                LAQ:B:  2 x 10  20X  NT                     Supine hamstring stretch:B:  20 sec x 5  20SEC 5X  NT 20 sec x 5 20 sec x 5 20 sec x 5 20 sec x 5 20 sec x 5   LTR:B:  10 sec x 5  20X 3SEC  NT 10 sec x 5 10 sec x 5 10 sec x 5 10 sec x 5 10 sec x 5   Piriformis stretch:B:  20 sec x 5  20SEC 5X  20 sec x 5 20 sec x 5 20 sec x 5 20 sec x 5 20 sec x 5 20 sec x 5   DLS abdominal bracing  3 sec x 20 20x3\" 20X 3SEC  NT NT NT NT NT NT   bridges  3 sec x 20 20x3\" 20X 3SEC  3 sec x 20 3 sec x 20   2 x 10 2 x 10   DLS abdominal bracing with hip flexion:B:  2 x 10 2x10 20X  2 x 10 2 x 10 NT  2 x 10 2 x 10   DLS abdominal bracing with slr flexion:B:  NT 2x10 ea 20X  2 x 10 2 x 10 NT  2 x 10 2 x 10   SAQ:B:  NT  NT NT NT NT      Supine hip abduction  NT  NT NT NT NT      Hook lying hip adduction isometrics:B:  3 sec x 20  20X 3SEC  NT NT NT      Hook lying hip abduction isometrics:B:  3 sec x 20  20X 3SEC BTB  NT NT NT                   Prone TKE:B:  NT  NT NT NT NT  NT    Prone hip extension:B:  NT 2x10 ea NT NT NT NT  NT    Prone hip IR and ER:L  NT 15x ea MAN  NT NT NT  NT    Prone knee flexion stretch:L:  NT Np, full range min stretch D/C  NT NT NT  D/C    Prone knee B st with bolster under knee     NT NT NT NT  D/C    Prone knee flexion arom:B:  NT  D/C  NT D/C D/C  D/C    Side stepping and tandem ambulation         NT add   Mini squats  NT  NT  NT NT  NT add   Lunges on foam:B:  NT  NT  NT NT  NT    SLR x 3:B:  NT  NT  NT NT  NT    Forward " step ups:B  NT  NT  NT NT  NT    Lateral step ups:B:  NT  NT  NT NT  NT    Step downs:B  NT  NT  NT NT  NT    Hip hiking:B:  NT  NT  NT NT  NT add   SLS and tandem stance:B:         NT add                                          Ther Activity                                       Gait Training                                       Modalities             MHP to bilateral hips sitting PRN  NT  NT

## 2024-08-13 ENCOUNTER — OFFICE VISIT (OUTPATIENT)
Dept: OBGYN CLINIC | Facility: CLINIC | Age: 74
End: 2024-08-13
Payer: MEDICARE

## 2024-08-13 VITALS
BODY MASS INDEX: 27.12 KG/M2 | SYSTOLIC BLOOD PRESSURE: 133 MMHG | HEART RATE: 61 BPM | DIASTOLIC BLOOD PRESSURE: 74 MMHG | WEIGHT: 158 LBS

## 2024-08-13 DIAGNOSIS — M16.12 PRIMARY OSTEOARTHRITIS OF LEFT HIP: Primary | ICD-10-CM

## 2024-08-13 PROCEDURE — 20611 DRAIN/INJ JOINT/BURSA W/US: CPT | Performed by: PHYSICAL MEDICINE & REHABILITATION

## 2024-08-13 PROCEDURE — 99204 OFFICE O/P NEW MOD 45 MIN: CPT | Performed by: PHYSICAL MEDICINE & REHABILITATION

## 2024-08-13 RX ORDER — TRIAMCINOLONE ACETONIDE 40 MG/ML
80 INJECTION, SUSPENSION INTRA-ARTICULAR; INTRAMUSCULAR
Status: COMPLETED | OUTPATIENT
Start: 2024-08-13 | End: 2024-08-13

## 2024-08-13 RX ORDER — ROPIVACAINE HYDROCHLORIDE 5 MG/ML
10 INJECTION, SOLUTION EPIDURAL; INFILTRATION; PERINEURAL
Status: COMPLETED | OUTPATIENT
Start: 2024-08-13 | End: 2024-08-13

## 2024-08-13 RX ADMIN — TRIAMCINOLONE ACETONIDE 80 MG: 40 INJECTION, SUSPENSION INTRA-ARTICULAR; INTRAMUSCULAR at 10:15

## 2024-08-13 RX ADMIN — ROPIVACAINE HYDROCHLORIDE 10 ML: 5 INJECTION, SOLUTION EPIDURAL; INFILTRATION; PERINEURAL at 10:15

## 2024-08-13 NOTE — PROGRESS NOTES
1. Primary osteoarthritis of left hip  Large joint arthrocentesis: L iliopsoas bursa        Orders Placed This Encounter   Procedures    Large joint arthrocentesis: L iliopsoas bursa      Impression:  This is a patient referred by Dr. Dunlap's team with left hip pain secondary to OA, psoas tendinitis and GT bursitis.  The patient is a good candidate for ultrasound-guided psoas peritendon injection.  Please see procedure note below.  Patient tolerated the procedure and reported no pain afterwards but did not have much pain to begin with.  I will see her back in 4 weeks for possible hip joint injection with ultrasound guidance.    Imaging Studies (I personally reviewed images in PACS and report):  Hip x-rays most recent to this encounter reviewed.  These images show mild left hip osteoarthritis.  No acute osseous abnormalities.    No follow-ups on file.    Patient is in agreement with the above plan.    HPI:  Jaye Jasso is a 74 y.o. female  who presents for evaluation of Pain and Follow-up of the Left Hip     History of present illness from referring clinician's notes reviewed.  Chronic pain in her glute, side of the hip, front of the hip and groin.    Following history reviewed and updated:  Past Medical History:   Diagnosis Date    Asymptomatic spider vein     Collagenous colitis     Disc disorder     Female bladder prolapse     Fibromuscular dysplasia (HCC)     FMD (facioscapulohumeral muscular dystrophy) (HCC)     Hypertension     Incomplete emptying of bladder     Osteoarthritis     Secondary hyperaldosteronism (HCC)      Past Surgical History:   Procedure Laterality Date    HYSTERECTOMY      TUBAL LIGATION       Social History   Social History     Substance and Sexual Activity   Alcohol Use Not Currently    Comment: Drinks wine      Social History     Substance and Sexual Activity   Drug Use Never     Social History     Tobacco Use   Smoking Status Never    Passive exposure: Never   Smokeless Tobacco  Never     Family History   Problem Relation Age of Onset    Gout Mother     Heart disease Mother     Hypertension Mother     Diabetes Father     Heart disease Father     Hypertension Father     Gout Sister     Heart disease Sister     Hypertension Sister     Gout Brother     Heart disease Brother     Hypertension Brother      Allergies   Allergen Reactions    Misc. Sulfonamide Containing Compounds Anaphylaxis    Sulfa Antibiotics Anaphylaxis and Other (See Comments)     rash    Zinc Rash    Other      Annotation - 26Jan2016: many inhalants, carries epi pen    Nabumetone Rash and Angioedema        Constitutional:  /74   Pulse 61   Wt 71.7 kg (158 lb)   BMI 27.12 kg/m²    General: NAD.  Eyes: Anicteric sclerae.  Neck: Supple.  Lungs: Unlabored breathing.  Cardiovascular: No lower extremity edema.  Skin: Intact without erythema.  Neurologic: Sensation intact to light touch.  Psychiatric: Mood and affect are appropriate.      Large joint arthrocentesis: L iliopsoas bursa  Universal Protocol:  Procedure performed by:  Consent: Verbal consent obtained. Written consent not obtained.  Consent given by: patient  Timeout called at: 8/13/2024 10:05 AM.  Patient understanding: patient states understanding of the procedure being performed  Site marked: the operative site was marked  Radiology Images displayed and confirmed. If images not available, report reviewed: imaging studies available  Patient identity confirmed: verbally with patient  Supporting Documentation  Indications: pain and diagnostic evaluation   Procedure Details  Location: hip - L iliopsoas bursa  Ultrasound guidance: yes (US guidance was used to find the area of interest.)  Medications administered: 80 mg triamcinolone acetonide 40 mg/mL; 10 mL ropivacaine 0.5 %    Patient tolerance: patient tolerated the procedure well with no immediate complications  Dressing:  Sterile dressing applied    The left iliopsoas peritendon was visualized with ultrasound  and injected with steroid/anesthetic solution as indicated.    Prior to the injection, the ultrasound was used to evaluate for any neural or vascular structures.  Care was taken to avoid these structures.    The images (and video if taken) were saved to the [employer] ultrasound system.    Risks of this procedure include:    - Risk of bleeding since a needle is involved.  - Risk of infection (1/10,000 chance as per recent studies).  Signs/symptoms were discussed and they would prompt an urgent evaluation at an emergency department.  - Risk of pigmentation or skin dimpling in the skin (2-3% chance as per recent studies) from the steroid.  - Risk of increased pain from steroid flare (1% chance as per recent studies) that typically lasts 24-48 hours.  - Risk of increased blood sugars from the steroid medication that can last for a few weeks.  If the patient is a diabetic or pre-diabetic, they were encouraged to closely monitor their blood sugars and discuss with PCP if elevated more than usual or if having symptoms.    We decided that the benefits outweigh the risks and so we proceeded with the procedure.

## 2024-08-15 ENCOUNTER — OFFICE VISIT (OUTPATIENT)
Dept: PHYSICAL THERAPY | Age: 74
End: 2024-08-15
Payer: MEDICARE

## 2024-08-15 DIAGNOSIS — M16.12 PRIMARY OSTEOARTHRITIS OF LEFT HIP: ICD-10-CM

## 2024-08-15 DIAGNOSIS — M76.32 IT BAND SYNDROME, LEFT: ICD-10-CM

## 2024-08-15 DIAGNOSIS — M76.12 PSOAS TENDONITIS OF LEFT SIDE: Primary | ICD-10-CM

## 2024-08-15 DIAGNOSIS — M70.62 TROCHANTERIC BURSITIS OF LEFT HIP: ICD-10-CM

## 2024-08-15 PROCEDURE — 97140 MANUAL THERAPY 1/> REGIONS: CPT | Performed by: PHYSICAL THERAPIST

## 2024-08-15 PROCEDURE — 97110 THERAPEUTIC EXERCISES: CPT | Performed by: PHYSICAL THERAPIST

## 2024-08-15 NOTE — PROGRESS NOTES
Daily Note     Today's date: 8/15/2024  Patient name: Jyae Jasso  : 1950  MRN: [id number]  Referring provider: Luz Sood PA-C  Dx:   Encounter Diagnosis     ICD-10-CM    1. Psoas tendonitis of left side  M76.12       2. Trochanteric bursitis of left hip  M70.62       3. It band syndrome, left  M76.32       4. Primary osteoarthritis of left hip  M16.12                      Subjective: Patient reported she is got a left hip psoas injection on 24 which has improved her left hip mobility immediately and she noted less pain today as well.  Patient reported that her left ankle pain is produced with outside walking up a hill which creates left leg to buckle sensation without falling.      Objective: See treatment diary below.      Assessment: Patient presents with immediate left lower extremity mobility with self stretching and arom, while she exhibits a decrease in left ITB segments of muscle spasm / guarding as well.  Patient exhibits the ability to resume open kinetic chain activities and self stretching without left hip / Iliopsoas tendon region symptom aggravation.  Patient has not resumed active, resistive and balance activities in weight baring in PT to address weakness and movement deficits initially created by left hip pain.  Plus, she lacks long term left hip and groin pain reduction that causes standing based functional deficits.  Also, patient exhibit left ankle DF  weight baring functional activities limited by full DF movement dysfunction.  Thus, PT is warranted to facilitate left hip pain reduction, mobility progress to promote full functional progress.          Plan: Continue per plan of care.  Progress treatment as tolerated.        Precautions: Patient's PMHx is remarkable for HTN, Asthma and Osteoporosis.    Access Code: [email or web address]  URL: [email or web address]  Date: 2024  Prepared by: Ramana Gracia    Exercises  - ITB Stretch at Wall  - 1 x daily - 7 x  "weekly - 1 sets - 5 reps - 10 seconds hold  - Hip Flexor Stretch on Step  - 1 x daily - 7 x weekly - 1 sets - 5 reps - 10 seconds hold      Manuals 7/15 7/17 723 7/30 8/1 8/6 8/9 8/12 8/15    IASTM to left lateral hip and ITB region   10 min  10 min 10 min 10 min 10 min 10 min 10 min 10 min plus left ankle PA mobilization in seated and long sit     L hip PROM, ITB, LH NT            Psoas release, LH NT                        Neuro Re-Ed                                                                                                        Ther Ex 7/15                         Nu step 8' 10 min  10' 10 min 8 min 10 min 10 min 10 min 10 min                 Standing ITB stretch:L  20SEC 5X  20 sec x 5 20 sec x 5 20 sec x 5 20 sec  x 5 20 sec x 5 20 sec x 5 20 sec x 5    Standing hip flexors stretch:B: 5x10\" 20sec 5x  20 sec x 5 20 sec x 5 20 sec x 5 20 sec x 5 20 sec x 5 20 sec x 5 20 sec x 5                 LAQ:B:  20X  NT                       Supine hamstring stretch:B:  20SEC 5X  NT 20 sec x 5 20 sec x 5 20 sec x 5 20 sec x 5 20 sec x 5 20 sec x 5    LTR:B:  20X 3SEC  NT 10 sec x 5 10 sec x 5 10 sec x 5 10 sec x 5 10 sec x 5 10 sec x 5    Piriformis stretch:B:  20SEC 5X  20 sec x 5 20 sec x 5 20 sec x 5 20 sec x 5 20 sec x 5 20 sec x 5 20 sec x 5    DLS abdominal bracing 20x3\" 20X 3SEC  NT NT NT NT NT NT NT    bridges 20x3\" 20X 3SEC  3 sec x 20 3 sec x 20   2 x 10 2 x 10 2 x 10    DLS abdominal bracing with hip flexion:B: 2x10 20X  2 x 10 2 x 10 NT  2 x 10 2 x 10 2 x 10    DLS abdominal bracing with slr flexion:B: 2x10 ea 20X  2 x 10 2 x 10 NT  2 x 10 2 x 10 2 x 10    SAQ:B:  NT NT NT NT        Supine hip abduction  NT NT NT NT        Hook lying hip adduction isometrics:B:  20X 3SEC  NT NT NT        Hook lying hip abduction isometrics:B:  20X 3SEC BTB  NT NT NT                     Prone TKE:B:  NT NT NT NT  NT      Prone hip extension:B: 2x10 ea NT NT NT NT  NT   assess   Prone hip IR and ER:L 15x ea MAN  NT NT NT  " NT   assess   Prone knee flexion stretch:L: Np, full range min stretch D/C  NT NT NT  D/C      Prone knee B st with bolster under knee   NT NT NT NT  D/C      Prone knee flexion arom:B:  D/C  NT D/C D/C  D/C      Side stepping and tandem ambulation       NT add  assess   Mini squats  NT  NT NT  NT add  assess   Lunges on foam:B:  NT  NT NT  NT   assess   SLR x 3:B:  NT  NT NT  NT      Forward step ups:B  NT  NT NT  NT      Lateral step ups:B:  NT  NT NT  NT      Step downs:B  NT  NT NT  NT      Hip hiking:B:  NT  NT NT  NT add     SLS and tandem stance:B:       NT add                                            Ther Activity                                       Gait Training                                       Modalities             MHP to bilateral hips sitting PRN  NT

## 2024-08-19 ENCOUNTER — APPOINTMENT (OUTPATIENT)
Dept: PHYSICAL THERAPY | Age: 74
End: 2024-08-19
Payer: MEDICARE

## 2024-08-21 ENCOUNTER — HOSPITAL ENCOUNTER (OUTPATIENT)
Dept: RADIOLOGY | Facility: MEDICAL CENTER | Age: 74
Discharge: HOME | End: 2024-08-21
Payer: MEDICARE

## 2024-08-21 ENCOUNTER — OFFICE VISIT (OUTPATIENT)
Dept: PHYSICAL THERAPY | Age: 74
End: 2024-08-21
Payer: MEDICARE

## 2024-08-21 DIAGNOSIS — M76.12 PSOAS TENDONITIS OF LEFT SIDE: Primary | ICD-10-CM

## 2024-08-21 DIAGNOSIS — M81.0 AGE-RELATED OSTEOPOROSIS WITHOUT CURRENT PATHOLOGICAL FRACTURE: ICD-10-CM

## 2024-08-21 DIAGNOSIS — M70.62 TROCHANTERIC BURSITIS OF LEFT HIP: ICD-10-CM

## 2024-08-21 DIAGNOSIS — Z13.820 SCREENING FOR OSTEOPOROSIS: ICD-10-CM

## 2024-08-21 PROCEDURE — 77080 DXA BONE DENSITY AXIAL: CPT

## 2024-08-21 PROCEDURE — 97140 MANUAL THERAPY 1/> REGIONS: CPT

## 2024-08-21 PROCEDURE — 97110 THERAPEUTIC EXERCISES: CPT

## 2024-08-21 NOTE — PROGRESS NOTES
"Daily Note     Today's date: 2024  Patient name: Jaye Jasso  : 1950  MRN: [id number]  Referring provider: Luz Sood PA-C  Dx:   Encounter Diagnosis     ICD-10-CM    1. Psoas tendonitis of left side  M76.12       2. Trochanteric bursitis of left hip  M70.62                        Subjective: Patient reported \"I am feeling so much better\"       Objective: See treatment diary below.      Assessment: Good tolerance to exercises. Progress as able         Plan: Continue per plan of care.  Progress treatment as tolerated.        Precautions: Patient's PMHx is remarkable for HTN, Asthma and Osteoporosis.    Access Code: [email or web address]  URL: [email or web address]  Date: 2024  Prepared by: Ramana Gracia    Exercises  - ITB Stretch at Wall  - 1 x daily - 7 x weekly - 1 sets - 5 reps - 10 seconds hold  - Hip Flexor Stretch on Step  - 1 x daily - 7 x weekly - 1 sets - 5 reps - 10 seconds hold      Manuals 7/15 7/17 723  8/9 8/12 8/15 8/21   IASTM to left lateral hip and ITB region   10 min  10 min 10 min 10 min 10 min 10 min 10 min 10 min plus left ankle PA mobilization in seated and long sit 10 min     L hip PROM, ITB, LH NT            Psoas release, LH NT                        Neuro Re-Ed                                                                                                        Ther Ex 7/15                         Nu step 8' 10 min  10' 10 min 8 min 10 min 10 min 10 min 10 min 10 min                 Standing ITB stretch:L  20SEC 5X  20 sec x 5 20 sec x 5 20 sec x 5 20 sec  x 5 20 sec x 5 20 sec x 5 20 sec x 5 20sec 5x    Standing hip flexors stretch:B: 5x10\" 20sec 5x  20 sec x 5 20 sec x 5 20 sec x 5 20 sec x 5 20 sec x 5 20 sec x 5 20 sec x 5 20sec 5x                 LAQ:B:  20X  NT                       Supine hamstring stretch:B:  20SEC 5X  NT 20 sec x 5 20 sec x 5 20 sec x 5 20 sec x 5 20 sec x 5 20 sec x 5 20sec 5x    LTR:B:  20X 3SEC  NT 10 sec x 5 10 " "sec x 5 10 sec x 5 10 sec x 5 10 sec x 5 10 sec x 5 10x 1s0ec    Piriformis stretch:B:  20SEC 5X  20 sec x 5 20 sec x 5 20 sec x 5 20 sec x 5 20 sec x 5 20 sec x 5 20 sec x 5 20sec 5x    DLS abdominal bracing 20x3\" 20X 3SEC  NT NT NT NT NT NT NT NT   bridges 20x3\" 20X 3SEC  3 sec x 20 3 sec x 20   2 x 10 2 x 10 2 x 10 20X    DLS abdominal bracing with hip flexion:B: 2x10 20X  2 x 10 2 x 10 NT  2 x 10 2 x 10 2 x 10 20X    DLS abdominal bracing with slr flexion:B: 2x10 ea 20X  2 x 10 2 x 10 NT  2 x 10 2 x 10 2 x 10 20X    SAQ:B:  NT NT NT NT        Supine hip abduction  NT NT NT NT        Hook lying hip adduction isometrics:B:  20X 3SEC  NT NT NT        Hook lying hip abduction isometrics:B:  20X 3SEC BTB  NT NT NT        Prone IR/ER B hip           20x 3sec    Prone TKE:B:  NT NT NT NT  NT   20X 3SEC    Prone hip extension:B: 2x10 ea NT NT NT NT  NT    NT   Prone hip IR and ER:L 15x ea MAN  NT NT NT  NT   NT   Prone knee flexion stretch:L: Np, full range min stretch D/C  NT NT NT  D/C   NT   Prone knee B st with bolster under knee   NT NT NT NT  D/C      Prone knee flexion arom:B:  D/C  NT D/C D/C  D/C      Side stepping and tandem ambulation       NT add  assess   Mini squats  NT  NT NT  NT add  20X    Lunges on foam:B:  NT  NT NT  NT   20X    SLR x 3:B:  NT  NT NT  NT      Forward step ups:B  NT  NT NT  NT      Lateral step ups:B:  NT  NT NT  NT      Step downs:B  NT  NT NT  NT      Hip hiking:B:  NT  NT NT  NT add     SLS and tandem stance:B:       NT add                                            Ther Activity                                       Gait Training                                       Modalities             MHP to bilateral hips sitting PRN  NT                               "

## 2024-08-22 ENCOUNTER — APPOINTMENT (OUTPATIENT)
Dept: PHYSICAL THERAPY | Age: 74
End: 2024-08-22
Payer: MEDICARE

## 2024-08-23 ENCOUNTER — OFFICE VISIT (OUTPATIENT)
Dept: PHYSICAL THERAPY | Age: 74
End: 2024-08-23
Payer: MEDICARE

## 2024-08-23 DIAGNOSIS — M76.12 PSOAS TENDONITIS OF LEFT SIDE: Primary | ICD-10-CM

## 2024-08-23 DIAGNOSIS — M76.32 IT BAND SYNDROME, LEFT: ICD-10-CM

## 2024-08-23 DIAGNOSIS — M70.62 TROCHANTERIC BURSITIS OF LEFT HIP: ICD-10-CM

## 2024-08-23 DIAGNOSIS — M16.12 PRIMARY OSTEOARTHRITIS OF LEFT HIP: ICD-10-CM

## 2024-08-23 PROCEDURE — 97140 MANUAL THERAPY 1/> REGIONS: CPT | Performed by: PHYSICAL THERAPIST

## 2024-08-23 PROCEDURE — 97110 THERAPEUTIC EXERCISES: CPT | Performed by: PHYSICAL THERAPIST

## 2024-08-23 PROCEDURE — 97112 NEUROMUSCULAR REEDUCATION: CPT | Performed by: PHYSICAL THERAPIST

## 2024-08-23 NOTE — PROGRESS NOTES
Daily Note     Today's date: 2024  Patient name: Jaye Jasso  : 1950  MRN: [id number]  Referring provider: Luz Sood PA-C  Dx:   Encounter Diagnosis     ICD-10-CM    1. Psoas tendonitis of left side  M76.12       2. Trochanteric bursitis of left hip  M70.62       3. It band syndrome, left  M76.32       4. Primary osteoarthritis of left hip  M16.12                        Subjective: Patient reported since injection in left hip her pain is improved and she noted only minimal left lateral hip and ITB region pain with standing based functional activities.      Objective: See treatment diary below.      Assessment: Patient presents with significant decrease in left ITB and hip muscle spasms and guarding which presents with improved left hip mobility and standing, walking based functional progress.  But, she lacks long term left hip pain reduction that limits long term self functional progress with standing based self, house hold and work activities.  Thus, PT is warranted to facilitate left hip pain reduction, mobility progress to promote full functional progress.          Plan: Continue per plan of care.  Progress treatment as tolerated.        Precautions: Patient's PMHx is remarkable for HTN, Asthma and Osteoporosis.    Access Code: [email or web address]  URL: [email or web address]  Date: 2024  Prepared by: Ramana Gracia    Exercises  - ITB Stretch at Wall  - 1 x daily - 7 x weekly - 1 sets - 5 reps - 10 seconds hold  - Hip Flexor Stretch on Step  - 1 x daily - 7 x weekly - 1 sets - 5 reps - 10 seconds hold      Manuals 723 7/30 8/1 8/6 8/9 8/12 8/15 8/21 8/23     IASTM to left lateral hip and ITB region  10 min 10 min 10 min 10 min 10 min 10 min 10 min plus left ankle PA mobilization in seated and long sit 10 min  10 min                                               Neuro Re-Ed                                                                                                                 Ther Ex                            Nu step 10' 10 min 8 min 10 min 10 min 10 min 10 min 10 min  10 min                   Standing ITB stretch:L 20 sec x 5 20 sec x 5 20 sec x 5 20 sec  x 5 20 sec x 5 20 sec x 5 20 sec x 5 20sec 5x  20 sec x 5     Standing hip flexors stretch:B: 20 sec x 5 20 sec x 5 20 sec x 5 20 sec x 5 20 sec x 5 20 sec x 5 20 sec x 5 20sec 5x  20 sec x 5                   LAQ:B: NT                           Supine hamstring stretch:B: NT 20 sec x 5 20 sec x 5 20 sec x 5 20 sec x 5 20 sec x 5 20 sec x 5 20sec 5x  20 sec x 5     LTR:B: NT 10 sec x 5 10 sec x 5 10 sec x 5 10 sec x 5 10 sec x 5 10 sec x 5 10x 10 sec  10 sec x 5     Piriformis stretch:B: 20 sec x 5 20 sec x 5 20 sec x 5 20 sec x 5 20 sec x 5 20 sec x 5 20 sec x 5 20sec 5x  NT     DLS abdominal bracing NT NT NT NT NT NT NT NT NT     bridges 3 sec x 20 3 sec x 20   2 x 10 2 x 10 2 x 10 20X  2 x 10     DLS abdominal bracing with hip flexion:B: 2 x 10 2 x 10 NT  2 x 10 2 x 10 2 x 10 20X  2 x 10     DLS abdominal bracing with slr flexion:B: 2 x 10 2 x 10 NT  2 x 10 2 x 10 2 x 10 20X  2 x 10     SAQ:B: NT NT NT           Supine hip abduction NT NT NT           Hook lying hip adduction isometrics:B: NT NT NT           Hook lying hip abduction isometrics:B: NT NT NT           Prone IR/ER B hip         20x 3sec       Prone TKE:B: NT NT NT  NT   20X 3SEC       Prone hip extension:B: NT NT NT  NT    NT      Prone hip IR and ER:L NT NT NT  NT   NT      Prone knee flexion stretch:L: NT NT NT  D/C   NT      Prone knee B st with bolster under knee  NT NT NT  D/C         Prone knee flexion arom:B: NT D/C D/C  D/C         Side stepping and tandem ambulation     NT add  assess      Mini squats  NT NT  NT add  20X       Lunges on foam:B:  NT NT  NT   20X       SLR x 3:B:  NT NT  NT         Forward step ups:B  NT NT  NT         Lateral step ups:B:  NT NT  NT         Step downs:B  NT NT  NT         Hip hiking:B:  NT NT  NT add        SLS and tandem  stance:B:     NT add                                                  Ther Activity                                          Gait Training                                          Modalities              MHP to bilateral hips sitting PRN

## 2024-08-26 ENCOUNTER — OFFICE VISIT (OUTPATIENT)
Dept: PHYSICAL THERAPY | Age: 74
End: 2024-08-26
Payer: MEDICARE

## 2024-08-26 DIAGNOSIS — M76.12 PSOAS TENDONITIS OF LEFT SIDE: Primary | ICD-10-CM

## 2024-08-26 DIAGNOSIS — M70.62 TROCHANTERIC BURSITIS OF LEFT HIP: ICD-10-CM

## 2024-08-26 DIAGNOSIS — M76.32 IT BAND SYNDROME, LEFT: ICD-10-CM

## 2024-08-26 DIAGNOSIS — M16.12 PRIMARY OSTEOARTHRITIS OF LEFT HIP: ICD-10-CM

## 2024-08-26 PROCEDURE — 97140 MANUAL THERAPY 1/> REGIONS: CPT | Performed by: PHYSICAL THERAPIST

## 2024-08-26 PROCEDURE — 97110 THERAPEUTIC EXERCISES: CPT | Performed by: PHYSICAL THERAPIST

## 2024-08-26 NOTE — PROGRESS NOTES
PT Evaluation  / PT Reassessment / PT D/C at end of week of 24.    Today's date: 2024  Patient name: Jaye Jasso  : 1950  MRN: [id number]  Referring provider: Luz Sood PA-C  Dx:   Encounter Diagnosis     ICD-10-CM    1. Psoas tendonitis of left side  M76.12       2. Trochanteric bursitis of left hip  M70.62       3. It band syndrome, left  M76.32       4. Primary osteoarthritis of left hip  M16.12                      Assessment  Impairments: abnormal gait, abnormal or restricted ROM, abnormal movement, activity intolerance, impaired physical strength, lacks appropriate home exercise program and pain with function    Assessment details: PT Reassessment: 24.  Patient reported the following progress since onset of PT: decrease in left hip pain, increase in left lower extremity mobility and strength, decrease in lumbar spine pain, walking, stair climbing, transfers.  But, she noted the following deficits that still persists: intermittent left hip pain that is short lived, prolonged walking.      PT Reassessment: 24.  Patient reported the following progress since onset of PT: increase in left hip mobility, increase in walking balance and house hold chores.  But, she noted the following deficits that still persists: left lateral hip pain that radiates into the left groin.  Patient noted she will follow up with her Orthopedist, Dr. Dunlap, regarding a left hip injection.      PT IE: 24  Patient reported she has had left lateral hip pain that is chronic that has ebbs and flows that limit sleep, walking, standing, transfers and stair climbing activities.  Patient noted prolonged sitting and then standing is limited by severe pain aggravation that she needs several steps to improve her gait to normal pace.  Patient noted she has left groin and lumbar spine pain.  Patient noted she has left > right lateral hip pain.  Patient noted getting in and out of the car is pain  aggravating.  Patient noted left lateral hip pain will radiate into the left lateral knee and left groin pain will radiate into the middle of the adductor muscle.  Patient noted she has had several episodes of the past 6 weeks of left knee buckle without falling and she denies falls.  Patient noted she has lumbar spine DDD.  Patient noted she had a left lateral hip ITB trochanteric bursa.  Patient noted noted has bilateral hip weakness persists that limits standing based functional activities.  Patient noted sleep is deprived as well.  Understanding of Dx/Px/POC: excellent     Prognosis: good  Prognosis details: Patient is a 73 y.o. year old female seen for outpatient PT re-evaluation with a Psoas tendonitis of left side [M76.12] . Patient presents with the following progress since onset of PT:  decrease in left hip and groin pain, increase in left lower extremity mobility and strength, decrease in TTP, gait improvements and functional progress with self, house hold and work activities. Patient presents to PT reassessment with the following problems, concerns, deficits and impairments: left hip and groin pain, decreased lumbar spine range of motion, decreased left hip strength, gait and stair dysfunctions, transfer dysfunction, + TTP, + special tests, functional limitations and decreased tolerance to activity.  Patient would benefit from skilled PT services under the following PT treatment plan to address the above noted deficits: therapeutic exercises and activities to facilitate lumbar spine mobility and bilateral lower extremity mobility and strength, modalities, manual therapy techniques, IASTM techniques, Kinesio taping techniques, gait and stair training, transfer training, traction, DLS and core strengthening, and a hep.  But, due to functional and impairment progress patient agrees with PT POC to d/c to hep after next PT visit. Thank you for the referral.     Goals  Short Term goals - 4 weeks  1.  Patient  will be independent HEP. MET.  2.  Patient will report a 25 - 50% decrease in pain complaints.  MET.  3.  Increase strength 1/2 grade.  MET.  4.  Increase ROM 5-10 degrees.  MET.    Long Term goals - 8 weeks  1.  Patient will report elimination of pain complaints.  Partially MET.  2.  Patient will return to all work related activities without restriction. MET.  3.  Patient will return to all recreational activities without restriction.Partially MET.  4.  ROM WFL. MET.  5.  Strength 5/5.Partially MET.  6.  Patient will report ability to perform all transfers without left hip and lumbar spine symptom aggravation or limitations. Partially MET.  7.  Patient will report ability to increase left side lying by > 10-15 minutes prior to left hip and lumbar spine symptom aggravation or limitations.   MET.  8.  Patient will report sleep improved by > 20-30 minutes prior to left hip and lumbar spine symptom aggravation or limitations. MET.  9.  Patient will report ability to improve her static standing activities involving house hold and work chores and activities without left hip and lumbar spine symptom aggravation or limitations.  MET.  10.  Patient will report ability to perform stair climbing without left hip and lumbar spine symptom aggravation or limitations.MET.  11.  Patient will report ability to walk for an additional 10-15 minutes prior to left hip and lumbar spine symptom aggravation or limitations.MET.    Plan  Patient would benefit from: skilled physical therapy and PT eval  Planned modality interventions: low level laser therapy, manual electrical stimulation, TENS, thermotherapy: hydrocollator packs, ultrasound, unattended electrical stimulation, cryotherapy and electrical stimulation/Russian stimulation    Planned therapy interventions: IASTM, joint mobilization, kinesiology taping, manual therapy, massage, balance, balance/weight bearing training, neuromuscular re-education, postural training, body mechanics  training, self care, compression, strengthening, stretching, therapeutic activities, therapeutic exercise, therapeutic training, transfer training, flexibility, functional ROM exercises, gait training, graded activity, graded exercise, graded motor, home exercise program, IADL retraining and aquatic therapy    Frequency: 2x week  Duration in weeks: 8  Treatment plan discussed with: patient        Subjective Evaluation    History of Present Illness  Mechanism of injury: Patient's PMHx is remarkable for HTN, Asthma and Osteoporosis.      LEFT HIP     INDICATION:   Pain in left hip.      COMPARISON:  None.     VIEWS:  XR HIP/PELV 2-3 VWS LEFT  W PELVIS IF PERFORMED   Images: 3     FINDINGS:     There is no acute fracture or dislocation.     Mild left hip osteoarthritis is seen.     No lytic or blastic osseous lesion.     Soft tissues are unremarkable.     Degenerative changes pubic symphysis and visualized lower lumbar spine.     IMPRESSION:        No acute osseous abnormality.     Degenerative changes as described.       Patient Goals  Patient goals for therapy: decreased pain, increased motion, improved balance, increased strength, independence with ADLs/IADLs and return to sport/leisure activities  Patient goal: to move better with less pain  Pain  At best pain ratin  At worst pain rating: 3  Location: Left hip          Objective     Tenderness     Additional Tenderness Details  Patient is + TTP at minimal levels at left greater trochanter region and left ITB.    Active Range of Motion     Lumbar   Flexion: 100 degrees   Extension: 32 degrees   Left rotation: 60 degrees   Right rotation: 66 degrees   Left Hip   Flexion: 125 degrees   Abduction: 25 degrees   External rotation (90/90): 35 degrees   Internal rotation (90/90): 28 degrees     Right Hip   Flexion: 128 degrees   Abduction: 34 degrees   External rotation (90/90): 30 degrees   Internal rotation (90/90): 50 degrees   Left Knee   Flexion: 132 degrees    Extension: -2 degrees   Extensor la degrees with pain    Right Knee   Flexion: 130 degrees   Extension: 0 degrees   Extensor la degrees     Strength/Myotome Testing     Left Hip   Planes of Motion   Flexion: 4+  Extension: 4  Abduction: 4  Adduction: 4+  External rotation: 4  Internal rotation: 4+    Right Hip   Planes of Motion   Flexion: 4+  Extension: 4  Abduction: 4  Adduction: 4+  External rotation: 4  Internal rotation: 4+    Left Knee   Flexion: 3+  Extension: 3+    Right Knee   Flexion: 4-  Extension: 4-    Left Ankle/Foot   Dorsiflexion: 4  Plantar flexion: 5    Right Ankle/Foot   Dorsiflexion: 4  Plantar flexion: 5    Tests     Left Hip   Positive ELODIA and piriformis.   Negative scour.   Inocente: Positive.     Ambulation     Ambulation: Level Surfaces   Ambulation without assistive device: independent    Additional Level Surfaces Ambulation Details  Patient ambulates with antalgic gait pattern of decrease in left stance and right swing phase.    Ambulation: Stairs   Ascend stairs: independent  Pattern: non-reciprocal  Railings: two rails  Descend stairs: independent  Pattern: non-reciprocal  Railings: two rails             Precautions: Patient's PMHx is remarkable for HTN, Asthma and Osteoporosis.    Access Code: [email or web address]  URL: [email or web address]  Date: 2024  Prepared by: Ramana Gracia    Exercises  - ITB Stretch at Wall  - 1 x daily - 7 x weekly - 1 sets - 5 reps - 10 seconds hold  - Hip Flexor Stretch on Step  - 1 x daily - 7 x weekly - 1 sets - 5 reps - 10 seconds hold      Manuals 723 7/30 8/1 8/6 8/9 8/12 8/15 8/21 8/23 8/26    IASTM to left lateral hip and ITB region  10 min 10 min 10 min 10 min 10 min 10 min 10 min plus left ankle PA mobilization in seated and long sit 10 min  10 min 10 min                                              Neuro Re-Ed                                                                                                                Ther Ex                             Nu step 10' 10 min 8 min 10 min 10 min 10 min 10 min 10 min  10 min 10 min                  Standing ITB stretch:L 20 sec x 5 20 sec x 5 20 sec x 5 20 sec  x 5 20 sec x 5 20 sec x 5 20 sec x 5 20sec 5x  20 sec x 5 20 sec x 5    Standing hip flexors stretch:B: 20 sec x 5 20 sec x 5 20 sec x 5 20 sec x 5 20 sec x 5 20 sec x 5 20 sec x 5 20sec 5x  20 sec x 5 20 sec x 5                  LAQ:B: NT                           Supine hamstring stretch:B: NT 20 sec x 5 20 sec x 5 20 sec x 5 20 sec x 5 20 sec x 5 20 sec x 5 20sec 5x  20 sec x 5 20 sec x 5    LTR:B: NT 10 sec x 5 10 sec x 5 10 sec x 5 10 sec x 5 10 sec x 5 10 sec x 5 10x 10 sec  10 sec x 5 10 sec  x 5    Piriformis stretch:B: 20 sec x 5 20 sec x 5 20 sec x 5 20 sec x 5 20 sec x 5 20 sec x 5 20 sec x 5 20sec 5x  NT 20 sec x 5    DLS abdominal bracing NT NT NT NT NT NT NT NT NT NT    bridges 3 sec x 20 3 sec x 20   2 x 10 2 x 10 2 x 10 20X  2 x 10 2 x 10    DLS abdominal bracing with hip flexion:B: 2 x 10 2 x 10 NT  2 x 10 2 x 10 2 x 10 20X  2 x 10 2 x 10    DLS abdominal bracing with slr flexion:B: 2 x 10 2 x 10 NT  2 x 10 2 x 10 2 x 10 20X  2 x 10 2 x 10    SAQ:B: NT NT NT           Supine hip abduction NT NT NT           Hook lying hip adduction isometrics:B: NT NT NT           Hook lying hip abduction isometrics:B: NT NT NT           Prone IR/ER B hip         20x 3sec       Prone TKE:B: NT NT NT  NT   20X 3SEC       Prone hip extension:B: NT NT NT  NT    NT      Prone hip IR and ER:L NT NT NT  NT   NT      Prone knee flexion stretch:L: NT NT NT  D/C   NT      Prone knee B st with bolster under knee  NT NT NT  D/C         Prone knee flexion arom:B: NT D/C D/C  D/C         Side stepping and tandem ambulation     NT add  assess      Mini squats  NT NT  NT add  20X       Lunges on foam:B:  NT NT  NT   20X       SLR x 3:B:  NT NT  NT         Forward step ups:B  NT NT  NT         Lateral step ups:B:  NT NT  NT         Step downs:B  NT NT   NT         Hip hiking:B:  NT NT  NT add        SLS and tandem stance:B:     NT add                                                  Ther Activity                                          Gait Training                                          Modalities              MHP to bilateral hips sitting PRN

## 2024-08-28 ENCOUNTER — OFFICE VISIT (OUTPATIENT)
Dept: PHYSICAL THERAPY | Age: 74
End: 2024-08-28
Payer: MEDICARE

## 2024-08-28 DIAGNOSIS — M76.12 PSOAS TENDONITIS OF LEFT SIDE: Primary | ICD-10-CM

## 2024-08-28 DIAGNOSIS — M70.62 TROCHANTERIC BURSITIS OF LEFT HIP: ICD-10-CM

## 2024-08-28 PROCEDURE — 97110 THERAPEUTIC EXERCISES: CPT

## 2024-08-28 NOTE — PROGRESS NOTES
"Daily Note     Today's date: 2024  Patient name: Jaye Jasso  : 1950  MRN: [id number]  Referring provider: Luz Sood PA-C  Dx:   Encounter Diagnosis     ICD-10-CM    1. Psoas tendonitis of left side  M76.12       2. Trochanteric bursitis of left hip  M70.62                          Subjective: Patient reported \"my right buttock is sore today\"       Objective: See treatment diary below.      Assessment: Good tolerance to manual work and exercises.         Plan: D/C to HEP      Precautions: Patient's PMHx is remarkable for HTN, Asthma and Osteoporosis.    Access Code: [email or web address]  URL: [email or web address]  Date: 2024  Prepared by: Ramana Gracia    Exercises  - ITB Stretch at Wall  - 1 x daily - 7 x weekly - 1 sets - 5 reps - 10 seconds hold  - Hip Flexor Stretch on Step  - 1 x daily - 7 x weekly - 1 sets - 5 reps - 10 seconds hold      Manuals 723 7/30 8/1 8/6 8/9 8/12 8/15 8/21 8/23 8/28    IASTM to left lateral hip and ITB region  10 min 10 min 10 min 10 min 10 min 10 min 10 min plus left ankle PA mobilization in seated and long sit 10 min  10 min 10 min                                               Neuro Re-Ed                                                                                                                Ther Ex                            Nu step 10' 10 min 8 min 10 min 10 min 10 min 10 min 10 min  10 min 10 min                   Standing ITB stretch:L 20 sec x 5 20 sec x 5 20 sec x 5 20 sec  x 5 20 sec x 5 20 sec x 5 20 sec x 5 20sec 5x  20 sec x 5 20sec 5x     Standing hip flexors stretch:B: 20 sec x 5 20 sec x 5 20 sec x 5 20 sec x 5 20 sec x 5 20 sec x 5 20 sec x 5 20sec 5x  20 sec x 5 20sec 5x                   LAQ:B: NT                           Supine hamstring stretch:B: NT 20 sec x 5 20 sec x 5 20 sec x 5 20 sec x 5 20 sec x 5 20 sec x 5 20sec 5x  20 sec x 5 20sec 5x     LTR:B: NT 10 sec x 5 10 sec x 5 10 sec x 5 10 sec x 5 10 sec x 5 10 sec x " 5 10x 10 sec  10 sec x 5 10sec 5x     Piriformis stretch:B: 20 sec x 5 20 sec x 5 20 sec x 5 20 sec x 5 20 sec x 5 20 sec x 5 20 sec x 5 20sec 5x  NT 20SEC 5X     DLS abdominal bracing NT NT NT NT NT NT NT NT NT NT    bridges 3 sec x 20 3 sec x 20   2 x 10 2 x 10 2 x 10 20X  2 x 10 20X 2SEC     DLS abdominal bracing with hip flexion:B: 2 x 10 2 x 10 NT  2 x 10 2 x 10 2 x 10 20X  2 x 10 20X 2SEC     DLS abdominal bracing with slr flexion:B: 2 x 10 2 x 10 NT  2 x 10 2 x 10 2 x 10 20X  2 x 10 20X     SAQ:B: NT NT NT       NT    Supine hip abduction NT NT NT       NT    Hook lying hip adduction isometrics:B: NT NT NT       NT    Hook lying hip abduction isometrics:B: NT NT NT       NT    Prone IR/ER B hip         20x 3sec   20X     Prone TKE:B: NT NT NT  NT   20X 3SEC   NT    Prone hip extension:B: NT NT NT  NT    NT  NT    Prone hip IR and ER:L NT NT NT  NT   NT  20X     Prone knee flexion stretch:L: NT NT NT  D/C   NT  NT    Prone knee B st with bolster under knee  NT NT NT  D/C     NT    Prone knee flexion arom:B: NT D/C D/C  D/C     NT    Side stepping and tandem ambulation     NT add  assess  NT    Mini squats  NT NT  NT add  20X   20X at wall      Lunges on foam:B:  NT NT  NT   20X   NT    SLR x 3:B:  NT NT  NT     NT    Forward step ups:B  NT NT  NT     NT    Lateral step ups:B:  NT NT  NT     NT    Step downs:B  NT NT  NT     NT    Hip hiking:B:  NT NT  NT add    NT    SLS and tandem stance:B:     NT add    NT                                              Ther Activity                                          Gait Training                                          Modalities              MHP to bilateral hips sitting PRN

## 2024-09-09 ENCOUNTER — OFFICE VISIT (OUTPATIENT)
Dept: OBGYN CLINIC | Facility: CLINIC | Age: 74
End: 2024-09-09
Payer: MEDICARE

## 2024-09-09 VITALS — HEIGHT: 64 IN | BODY MASS INDEX: 26.98 KG/M2 | WEIGHT: 158 LBS

## 2024-09-09 DIAGNOSIS — M76.12 PSOAS TENDONITIS OF LEFT SIDE: Primary | ICD-10-CM

## 2024-09-09 DIAGNOSIS — M16.12 PRIMARY OSTEOARTHRITIS OF LEFT HIP: ICD-10-CM

## 2024-09-09 DIAGNOSIS — M76.32 IT BAND SYNDROME, LEFT: ICD-10-CM

## 2024-09-09 PROCEDURE — 99212 OFFICE O/P EST SF 10 MIN: CPT | Performed by: ORTHOPAEDIC SURGERY

## 2024-09-09 NOTE — PROGRESS NOTES
Assessment:  1. Psoas tendonitis of left side        2. Primary osteoarthritis of left hip        3. It band syndrome, left          Patient Active Problem List   Diagnosis    Asthma    Osteoporosis    Fibromuscular dysplasia (HCC)    Renovascular hypertension    Trochanteric bursitis of left hip    Psoas tendonitis of left side    It band syndrome, left    Primary osteoarthritis of left hip    Diffuse connective tissue disease (HCC)       Plan:    74 y.o. female  with psoas tendinitis and IT band syndrome complicated by left hip osteoarthritis  Discussed with patient that as she is feeling well she can postpone injection of the left hip scheduled for this Friday.    Discussed can postpone for at least a month as she is feeling good and steroid injections can only be completed every 3 months and there effectiveness decreases  the more injections  she receives.  Discussed patient should continue at home physical therapy exercises as well as strengthening.  Discussed patient needs to continue these exercises to inhibit return of symptoms secondary to underlying pathologies.  Discussed patient should stay ahead of the pain and if she begins to experience any onset or flares should contact the office to possibly pursue another psoas tendon injection or a injection of the left hip joint.  Also discussed could provide the Medrol Dosepak at times of increased pain and inflammation to allow for resolve of symptoms.    Follow-up as needed for persistent pain and inflammation.    The assessment and plan were formulated by Dr. Dunlap and I assisted in carrying it out.    Subjective:   Patient ID: Jaye Jasso is a 74 y.o. female .    HPI    Patient presents to the office for follow up of left hip pain.  Patient states she had her steroid injection of the left hip psoas tendon with Dr. Chery which has helped greatly.  Patient was discharged from physical therapy but continues at home exercises.  Notes great relief with  [employer] of the IT band at physical therapy.  Overall doing well.    The following portions of the patient's history were reviewed and updated as appropriate: allergies, current medications, past family history, past social history, past surgical history and problem list.    Social History     Socioeconomic History    Marital status: /Civil Union     Spouse name: Not on file    Number of children: Not on file    Years of education: Not on file    Highest education level: Not on file   Occupational History    Not on file   Tobacco Use    Smoking status: Never     Passive exposure: Never    Smokeless tobacco: Never   Vaping Use    Vaping status: Never Used   Substance and Sexual Activity    Alcohol use: Not Currently     Comment: Drinks wine     Drug use: Never    Sexual activity: Not on file     Comment: defer   Other Topics Concern    Not on file   Social History Narrative    Not on file     Social Determinants of Health     Financial Resource Strain: Not on file   Food Insecurity: Not on file   Transportation Needs: Not on file   Physical Activity: Not on file   Stress: Not on file   Social Connections: Not on file   Intimate Partner Violence: Not on file   Housing Stability: Not on file     Past Medical History:   Diagnosis Date    Asymptomatic spider vein     Collagenous colitis     Disc disorder     Female bladder prolapse     Fibromuscular dysplasia (HCC)     FMD (facioscapulohumeral muscular dystrophy) (HCC)     Hypertension     Incomplete emptying of bladder     Osteoarthritis     Secondary hyperaldosteronism (HCC)      Past Surgical History:   Procedure Laterality Date    HYSTERECTOMY      TUBAL LIGATION       Allergies   Allergen Reactions    Misc. Sulfonamide Containing Compounds Anaphylaxis    Sulfa Antibiotics Anaphylaxis and Other (See Comments)     rash    Zinc Rash    Other      Annotation - 26Jan2016: many inhalants, carries epi pen    Nabumetone Rash and Angioedema     Current  Outpatient Medications on File Prior to Visit   Medication Sig Dispense Refill    Albuterol Sulfate 108 (90 Base) MCG/ACT AEPB Inhale 2 puffs every 4 (four) hours      aspirin 81 mg chewable tablet 1 every other day      beclomethasone (QVAR) 40 MCG/ACT inhaler Inhale 2 puffs      brinzolamide (AZOPT) 1 % ophthalmic suspension       Cholecalciferol (Vitamin D3) 50 MCG (2000 UT) capsule 6,000 IU daily      fexofenadine (Allegra Allergy) 180 MG tablet       losartan (COZAAR) 50 mg tablet 50 mg      LUMIGAN 0.01 % ophthalmic drops Administer 0.001 drops to both eyes 2 (two) times a day  11    metoprolol succinate (TOPROL-XL) 25 mg 24 hr tablet Take 1 tablet (25 mg total) by mouth daily 90 tablet 3    mometasone (NASONEX) 50 mcg/act nasal spray SPRAY 2 SPRAYS INTO EACH NOSTRIL EVERY DAY      multivitamin (THERAGRAN) TABS Take 1 tablet by mouth daily      ofloxacin (OCUFLOX) 0.3 % ophthalmic solution Administer 1 drop into the left eye 4 (four) times a day 5 mL 0    Olopatadine HCl 0.6 % SOLN 2 SPRAYS BY EACH NARE ROUTE 2 (TWO) TIMES A DAY AS NEEDED (RHINITIS).      rosuvastatin (CRESTOR) 10 MG tablet Take 10 mg by mouth daily      venlafaxine (EFFEXOR-XR) 75 mg 24 hr capsule TAKE 1 CAPSULE DAILY WITH 37.5 MG CAPSULE, TO EQUAL 112.5MG DAILY       No current facility-administered medications on file prior to visit.       Review of Systems   Constitutional:  Negative for chills and fever.   HENT:  Negative for ear pain and sore throat.    Eyes:  Negative for pain and visual disturbance.   Respiratory:  Negative for cough and shortness of breath.    Cardiovascular:  Negative for chest pain and palpitations.   Gastrointestinal:  Negative for abdominal pain and vomiting.   Genitourinary:  Negative for dysuria and hematuria.   Musculoskeletal:  Negative for arthralgias and back pain.   Skin:  Negative for color change and rash.   Neurological:  Negative for seizures and syncope.   All other systems reviewed and are  "negative.    See HPi    Objective:    There were no vitals filed for this visit.    Physical Exam  Vitals and nursing note reviewed.   Constitutional:       General: She is not in acute distress.     Appearance: She is well-developed.   HENT:      Head: Normocephalic and atraumatic.   Eyes:      Conjunctiva/sclera: Conjunctivae normal.   Cardiovascular:      Rate and Rhythm: Normal rate and regular rhythm.      Heart sounds: No murmur heard.  Pulmonary:      Effort: Pulmonary effort is normal. No respiratory distress.      Breath sounds: Normal breath sounds.   Abdominal:      Palpations: Abdomen is soft.      Tenderness: There is no abdominal tenderness.   Musculoskeletal:         General: No swelling.      Cervical back: Neck supple.   Skin:     General: Skin is warm and dry.      Capillary Refill: Capillary refill takes less than 2 seconds.   Neurological:      Mental Status: She is alert.   Psychiatric:         Mood and Affect: Mood normal.         Procedures  No Procedures performed today       Portions of the record may have been created with voice recognition software.  Occasional wrong word or \"sound a like\" substitutions may have occurred due to the inherent limitations of voice recognition software.  Read the chart carefully and recognize, using context, where substitutions have occurred.   "

## 2024-11-01 ENCOUNTER — TELEPHONE (OUTPATIENT)
Dept: OBGYN CLINIC | Facility: CLINIC | Age: 74
End: 2024-11-01

## 2024-11-01 NOTE — TELEPHONE ENCOUNTER
Left message for patient to call back to reschedule 11/8 appointment with Dr. Chery as he will be out of the office this day. Please forward upon call back - thanks!

## 2025-03-24 ENCOUNTER — CONSULT (OUTPATIENT)
Dept: PAIN MEDICINE | Facility: CLINIC | Age: 75
End: 2025-03-24
Payer: MEDICARE

## 2025-03-24 VITALS — HEIGHT: 64 IN | BODY MASS INDEX: 27.31 KG/M2 | WEIGHT: 160 LBS

## 2025-03-24 DIAGNOSIS — F33.1 MAJOR DEPRESSIVE DISORDER, RECURRENT EPISODE, MODERATE (HCC): ICD-10-CM

## 2025-03-24 DIAGNOSIS — M54.6 CHRONIC MIDLINE THORACIC BACK PAIN: ICD-10-CM

## 2025-03-24 DIAGNOSIS — M54.16 LUMBAR RADICULOPATHY: ICD-10-CM

## 2025-03-24 DIAGNOSIS — G89.29 CHRONIC MIDLINE THORACIC BACK PAIN: ICD-10-CM

## 2025-03-24 DIAGNOSIS — M70.61 TROCHANTERIC BURSITIS OF BOTH HIPS: Primary | ICD-10-CM

## 2025-03-24 DIAGNOSIS — M70.62 TROCHANTERIC BURSITIS OF BOTH HIPS: Primary | ICD-10-CM

## 2025-03-24 PROCEDURE — 99204 OFFICE O/P NEW MOD 45 MIN: CPT | Performed by: PAIN MEDICINE

## 2025-03-24 NOTE — PROGRESS NOTES
Assessment  1. Trochanteric bursitis of both hips  -     Ambulatory Referral to Physical Therapy; Future  2. Lumbar radiculopathy  -     XR spine lumbar minimum 4 views non injury; Future; Expected date: 03/24/2025  -     MRI lumbar spine wo contrast; Future; Expected date: 03/24/2025  -     Ambulatory Referral to Physical Therapy; Future  3. Chronic midline thoracic back pain  -     XR spine thoracic 3 vw; Future; Expected date: 03/24/2025  -     MRI thoracic spine wo contrast; Future; Expected date: 03/24/2025  -     Ambulatory Referral to Physical Therapy; Future  4. Major depressive disorder, recurrent episode, moderate (HCC)  -     Ambulatory Referral to Physical Therapy; Future        Jaye is a pleasant 74-year-old female history of mid to low back pain tenderness palpation thoracic paraspinals worse on the right compared to the left she also has bilateral tenderness in the bilateral bursa region.  For her symptoms we will arrive thoracic and lumbar region will get x-rays both areas as well suspect that there is some thoracic facet arthropathy and disc bulging at the T9-10 level at the level of her bra line which is contributing to her pain symptoms.  As far as her hips are concerned she has no significant hip arthritis on her x-rays she does have bilateral hip bursa tenderness she may benefit from a bilateral hip bursa injection to target the symptoms.  Will start physical therapy for her mid to low back and bilateral hips.        My impressions and treatment recommendations were discussed in detail with the patient who verbalized understanding and had no further questions.  Discharge instructions were provided. I personally saw and examined the patient and I agree with the above discussed plan of care.    Plan      No orders of the defined types were placed in this encounter.      Orders Placed This Encounter   Procedures   • XR spine thoracic 3 vw     Standing Status:   Future     Expected Date:    LUCÍA Taylor  Attending, Orthopaedic Surgery  Foot and Ankle  [facility]        ORTHOPAEDIC FOOT AND ANKLE CLINIC VISIT-ANKLE FRACTURE     Assessment:   No diagnosis found  Plan:   · The patient verbalized understanding of exam findings and treatment plan  We engaged in the shared decision-making process and treatment options were discussed at length with the patient  Surgical and conservative management discussed today along with risks and benefits  · The patient has an unstable pilon variant fracture which will be treated conservatively due to her baseline functional status   · Cast placed in clinic today  · XR reviewed that shows stable fracture alignment post cast  · Will see patient back in 3 weeks for XR evaluation and likely cast exchange             History of Present Illness:   Chief Complaint: No chief complaint on file  Mel Bangura is a 78 y o  female who is being seen for  right distal tibia and fibula fractures  Patient was seen in the hospital 2 weeks ago, patient at baseline is non ambulatory and discussions in the hospital at that time were had with the patient and given her baseline functional status non operative management was elected  Patient denies any issues with her splint since her hospitalization, at baseline she has no motor and very limited sensory function to the right foot  Past Medical, Surgical and Social History:  Past Medical History:  has a past medical history of Anemia, Arthritis, Back injury, Depression, MRSA (methicillin resistant staph aureus) culture positive (12/07/2018), Osteopenia, Osteoporosis, Paralysis ([address] ), Paraplegia ([address] ), Poor circulation, Pressure injury of skin, Pressure sore of hip, Sepsis ([address] ) (3/28/2020), Tibial plateau fracture, and Underweight  Problem List: does not have any pertinent problems on file    Past Surgical History:  has a past surgical history that includes [place] tooth 3/24/2025     Expiration Date:   3/24/2029     Scheduling Instructions:      Bring along any outside films relating to this procedure.         • XR spine lumbar minimum 4 views non injury     Standing Status:   Future     Expected Date:   3/24/2025     Expiration Date:   3/24/2029     Scheduling Instructions:      Bring along any outside films relating to this procedure.           Reason for Exam::   low back pain   • MRI thoracic spine wo contrast     Standing Status:   Future     Expected Date:   3/24/2025     Expiration Date:   3/24/2029     Scheduling Instructions:      There is no preparation for this test. Please leave your jewelry and valuables at home, wedding rings are the exception. Please bring your physician order, insurance cards, a form of photo ID and a list of your medications with you. Arrive 15 minutes prior to your appointment time in order to       register. Please bring any prior CT or MRI studies of this area that were not performed at a [facility].            To schedule this appointment, please contact Central Scheduling at [phone].     What is the patient's sedation requirement?:   No Sedation     Does the patient have metallic implants, such as a pacemaker or shunt?:   No   • MRI lumbar spine wo contrast     Standing Status:   Future     Expected Date:   3/24/2025     Expiration Date:   3/24/2029     Scheduling Instructions:      There is no preparation for this test. Please leave your jewelry and valuables at home, wedding rings are the exception. All patients will be required to change into a hospital gown and pants.  Street clothes are not permitted in the MRI.  Magnetic nail polish must be removed prior to arrival for your test. Please bring your insurance cards, a form of photo ID and a list of your medications with you. Arrive 15 minutes prior to your appointment time in order to register. Please bring any prior CT or MRI studies of this area that were not performed at  extraction; Toe amputation (Left, 2017); Hip debridement (Right, 2017); pr debridement, skin, sub-q tissue,muscle,bone,=<20 sq cm (Right, 9/19/2018); pr open rx femur fx+intramed jony (Left, 10/22/2018); FLAP LOCAL TRUNK (Right, 12/17/2018); Wound debridement (Left, 12/17/2018); Incision and drainage of wound (Left, 1/3/2019); FLAP LOCAL EXTREMITY (Left, 1/28/2019); FLAP LOCAL TRUNK (Left, 2/27/2019); CLOSURE DELAYED PRIMARY (N/A, 3/20/2019); IR PICC line (12/19/2018); IR PICC line (3/12/2019); Wound debridement (N/A, 11/10/2019); and Decubitus ulcer excision (Bilateral, 11/12/2019)  Family History: family history includes Depression in her father  Social History:  reports that she has never smoked  She has never used smokeless tobacco  She reports previous alcohol use  She reports that she does not use drugs  Current Medications: has a current medication list which includes the following prescription(s): doxycycline hyclate, glycerin-hypromellose-peg 400, multivitamin, pantoprazole, saccharomyces boulardii, acetaminophen, bisacodyl, and loperamide, and the following Facility-Administered Medications: dexamethasone, lactated ringers, lactated ringers, lactated ringers, lactated ringers, ondansetron, and promethazine  Allergies: is allergic to iv contrast [iodinated diagnostic agents], cefepime, ciprofloxacin, latex, and vancomycin       Review of Systems:  General- denies fever/chills  HEENT- denies hearing loss or sore throat  Eyes- denies eye pain or visual disturbances, denies red eyes  Respiratory- denies cough or SOB  Cardio- denies chest pain or palpitations  GI- denies abdominal pain  Endocrine- denies urinary frequency  Urinary- denies pain with urination  Musculoskeletal- Negative except noted above  Skin- denies rashes or wounds  Neurological- denies dizziness or headache  Psychiatric- denies anxiety or difficulty concentrating    Physical Exam:   Ht 5' (1 524 m)   Wt 52 2 kg (115 lb)   LMP  (LMP "a [facility] facility.            To schedule this appointment, please contact Central Scheduling at [phone].            Prior to your appointment, please make sure you complete the MRI Screening Form when you e-Check in for your appointment. This will be available starting 7 days before your appointment in [employer]. You may receive an e-mail with an activation code if you do not have a [employer] account. If you do not have access to a device, we will complete your screening at your appointment.     Reason for Exam:   low back and bilateral leg pain     For OP exams needed \"URGENT\", choose the appropriate timeframe below and call Central Scheduling at [phone]. No need to speak with a Radiologist.:   Not URGENT     What is the patient's sedation requirement?:   No Sedation     Does the patient need medication for Claustrophobia? If yes, order medication at this point.:   No     Does the patient wear a life vest, have an implanted cardiac device, a stimulation device, a sleep apnea stimulator, or a breast tissue expansion device?:   No     Release to patient through [employer]:   Immediate   • Ambulatory Referral to Physical Therapy     Standing Status:   Future     Expiration Date:   3/24/2026     Referral Priority:   Routine     Referral Type:   Physical Therapy     Referral Reason:   Specialty Services Required     Requested Specialty:   Physical Therapy     Number of Visits Requested:   1     Expiration Date:   3/24/2026       History of Present Illness    Jaye Jasso is a 74 y.o. female with relevant PMH of fibromuscular dysplasia, renovascular hypertension presenting to [facility] pain chief complaint of mid back pain ongoing for the past 1 year pain symptoms are increased with action of the thoracic spine with mid back pain that radiates to bilateral ribs and around the T9-10 distribution and denies radicular symptoms that radiate to the chest wall she also endorses occasional chest pain " Unknown)   BMI 22 46 kg/m²   General/Constitutional: No apparent distress  Eyes: normal ocular motion  Cardio: RRR, Normal S1S2, No m/r/g  Lymphatic: No appreciable lymphadenopathy  Respiratory: Non-labored breathing, CTA b/l no w/c/r  Vascular: No edema, swelling or tenderness, except as noted in detailed exam   Integumentary: Patient with large pressure ulcer to the right hip area   Neuro: Patient has no motor and very limited sensory function to the right leg  Psych: Normal mood and affect, oriented to person, place and time  Appropriate affect  Musculoskeletal: patient has very limited function of the lower extremities     Examination    right    Gait Not assessed due to baseline functional status    Musculoskeletal     Skin Splint taken down skin is well appearing, mild swelling     Nails Normal    Range of Motion  Not able to perform    Stability Unstable    Muscle Strength N/A tibialis anterior  N/A gastrocnemius-soleus  N/A posterior tibialis  N/A peroneal/eversion strength      Neurologic Minimal sensation to the right foot     Sensation       Cardiovascular Brisk capillary refill < 2 seconds    Special Tests None      Imaging Studies:   3 views of the  right ankle were taken, reviewed and interpreted independently that demonstrate acceptable alignment of her distal tibia and fibula fractures  Reviewed by me personally  Laverda Curb Lachman, MD  Foot & Ankle Surgery   Department of [place] [address]      I personally performed the service  Laverda Curb Lachman, MD that radiates from her mid back she also endorses bilateral leg pain symptoms with walking improved with sitting she had prior psoas injections which helped her hip pain along with the bursa injections on the left side which also were effective.  She also notes tenderness and and difficulty sleeping when she lies on either side of the of her hips.  Most of her pain is in the mid back area she is done to his physical therapy which has helped her in the pain) in the past.  Review of Systems   Musculoskeletal:  Positive for arthralgias, joint swelling and myalgias.   All other systems reviewed and are negative.      Patient Active Problem List   Diagnosis   • Asthma   • Osteoporosis   • Fibromuscular dysplasia (HCC)   • Renovascular hypertension   • Trochanteric bursitis of left hip   • Psoas tendonitis of left side   • It band syndrome, left   • Primary osteoarthritis of left hip   • Diffuse connective tissue disease (HCC)   • Major depressive disorder, recurrent episode, moderate (HCC)       Past Medical History:   Diagnosis Date   • Asymptomatic spider vein    • Collagenous colitis    • Disc disorder    • Female bladder prolapse    • Fibromuscular dysplasia (HCC)    • FMD (facioscapulohumeral muscular dystrophy) (HCC)    • Hypertension    • Incomplete emptying of bladder    • Osteoarthritis    • Secondary hyperaldosteronism (HCC)        Past Surgical History:   Procedure Laterality Date   • HYSTERECTOMY     • TUBAL LIGATION         Family History   Problem Relation Age of Onset   • Gout Mother    • Heart disease Mother    • Hypertension Mother    • Diabetes Father    • Heart disease Father    • Hypertension Father    • Gout Sister    • Heart disease Sister    • Hypertension Sister    • Gout Brother    • Heart disease Brother    • Hypertension Brother        Social History     Occupational History   • Not on file   Tobacco Use   • Smoking status: Never     Passive exposure: Never   • Smokeless tobacco: Never   Vaping  "Use   • Vaping status: Never Used   Substance and Sexual Activity   • Alcohol use: Not Currently     Comment: Drinks wine    • Drug use: Never   • Sexual activity: Not on file     Comment: defer       Current Outpatient Medications on File Prior to Visit   Medication Sig   • Albuterol Sulfate 108 (90 Base) MCG/ACT AEPB Inhale 2 puffs every 4 (four) hours   • aspirin 81 mg chewable tablet 1 every other day   • beclomethasone (QVAR) 40 MCG/ACT inhaler Inhale 2 puffs   • brinzolamide (AZOPT) 1 % ophthalmic suspension    • Cholecalciferol (Vitamin D3) 50 MCG (2000 UT) capsule 6,000 IU daily   • fexofenadine (Allegra Allergy) 180 MG tablet    • losartan (COZAAR) 50 mg tablet 50 mg   • LUMIGAN 0.01 % ophthalmic drops Administer 0.001 drops to both eyes 2 (two) times a day   • metoprolol succinate (TOPROL-XL) 25 mg 24 hr tablet Take 1 tablet (25 mg total) by mouth daily   • mometasone (NASONEX) 50 mcg/act nasal spray SPRAY 2 SPRAYS INTO EACH NOSTRIL EVERY DAY   • multivitamin (THERAGRAN) TABS Take 1 tablet by mouth daily   • ofloxacin (OCUFLOX) 0.3 % ophthalmic solution Administer 1 drop into the left eye 4 (four) times a day   • Olopatadine HCl 0.6 % SOLN 2 SPRAYS BY EACH NARE ROUTE 2 (TWO) TIMES A DAY AS NEEDED (RHINITIS).   • rosuvastatin (CRESTOR) 10 MG tablet Take 10 mg by mouth daily   • venlafaxine (EFFEXOR-XR) 75 mg 24 hr capsule TAKE 1 CAPSULE DAILY WITH 37.5 MG CAPSULE, TO EQUAL 112.5MG DAILY     No current facility-administered medications on file prior to visit.       Allergies   Allergen Reactions   • Misc. Sulfonamide Containing Compounds Anaphylaxis   • Sulfa Antibiotics Anaphylaxis and Other (See Comments)     rash   • Zinc Rash   • Other      Annotation - 26Jan2016: many inhalants, carries epi pen   • Nabumetone Rash and Angioedema         Physical Exam    Ht 5' 4\" (1.626 m)   Wt 72.6 kg (160 lb)   BMI 27.46 kg/m²         MSK:      Lumbar Spine:  No masses or atrophy,    Range of motion - Decreased " extension/flexion  Palpation - Tenderness to palpation in the lumbar parapsinals , thoracic parapsinals, worst on the right side  PSIS tenderness no  Claudio's/ELODIA no  Gaenslen's no  SLR no  Positive tenderenss bilateral hips       Strength Right Left   Hip flexion L1,2 5 5   Knee extension L3 5 5   Ankle dorsiflexion L4 5 5   Great toe extension L5 5 5   Ankle Plantarflexion S1 5 5       Sensory Exam:  intact to light touch bilateral LE       Reflexes:     Right Left   Biceps 2+ 2+   Triceps 2+ 2+   Brachioradialis 2+ 2+   Patellar 1+ 1+   Achilles 1+ 1+   Babinski neg neg        Gait normal                  Imaging

## 2025-03-25 ENCOUNTER — APPOINTMENT (OUTPATIENT)
Dept: RADIOLOGY | Age: 75
End: 2025-03-25
Payer: MEDICARE

## 2025-03-25 ENCOUNTER — RESULTS FOLLOW-UP (OUTPATIENT)
Dept: PAIN MEDICINE | Facility: CLINIC | Age: 75
End: 2025-03-25

## 2025-03-25 DIAGNOSIS — M54.6 CHRONIC MIDLINE THORACIC BACK PAIN: ICD-10-CM

## 2025-03-25 DIAGNOSIS — M54.16 LUMBAR RADICULOPATHY: ICD-10-CM

## 2025-03-25 DIAGNOSIS — G89.29 CHRONIC MIDLINE THORACIC BACK PAIN: ICD-10-CM

## 2025-03-25 PROCEDURE — 72110 X-RAY EXAM L-2 SPINE 4/>VWS: CPT

## 2025-03-25 PROCEDURE — 72072 X-RAY EXAM THORAC SPINE 3VWS: CPT

## 2025-03-31 ENCOUNTER — EVALUATION (OUTPATIENT)
Dept: PHYSICAL THERAPY | Age: 75
End: 2025-03-31
Payer: MEDICARE

## 2025-03-31 DIAGNOSIS — M70.61 TROCHANTERIC BURSITIS OF BOTH HIPS: Primary | ICD-10-CM

## 2025-03-31 DIAGNOSIS — G89.29 CHRONIC MIDLINE THORACIC BACK PAIN: ICD-10-CM

## 2025-03-31 DIAGNOSIS — M54.16 LUMBAR RADICULOPATHY: ICD-10-CM

## 2025-03-31 DIAGNOSIS — M54.6 CHRONIC MIDLINE THORACIC BACK PAIN: ICD-10-CM

## 2025-03-31 DIAGNOSIS — F33.1 MAJOR DEPRESSIVE DISORDER, RECURRENT EPISODE, MODERATE (HCC): ICD-10-CM

## 2025-03-31 DIAGNOSIS — M70.62 TROCHANTERIC BURSITIS OF BOTH HIPS: Primary | ICD-10-CM

## 2025-03-31 PROCEDURE — 97110 THERAPEUTIC EXERCISES: CPT | Performed by: PHYSICAL THERAPIST

## 2025-03-31 PROCEDURE — 97162 PT EVAL MOD COMPLEX 30 MIN: CPT | Performed by: PHYSICAL THERAPIST

## 2025-03-31 NOTE — PROGRESS NOTES
"PT Evaluation     Today's date: 3/31/2025  Patient name: Jaye Jsaso  : 1950  MRN: [id number]  Referring provider: Richard Aguilar*  Dx:   Encounter Diagnosis     ICD-10-CM    1. Trochanteric bursitis of both hips  M70.61     M70.62       2. Lumbar radiculopathy  M54.16       3. Chronic midline thoracic back pain  M54.6     G89.29                      Assessment  Impairments: abnormal gait, abnormal or restricted ROM, abnormal movement, activity intolerance, impaired physical strength, lacks appropriate home exercise program and pain with function    Assessment details: PT IE: 3/31/25.  Patient reported chronic bilateral hip, lumbar and thoracic spine pain was aggravated with self, house hold and work based activities.  Patient reported she also has to help her  around the house that aggravated her lumbar, thoracic and bilateral hip pain.  Patient noted she has thoracic spine pain that radiated into the anterior ribs.  Patient noted helping her  with his iadls and activities.  Patient noted she has return of bilateral hip bursitis.  Patient noted she had x-ray of lumbar spine and thoracic spine that showed arthritic changes.  Patient noted she saw her spine and pain specialist who ordered MRI on lumbar and thoracic spine.  Patient noted she gets lumbar and thoracic spine will get aggravated with bed transfers, prolonged sitting with pain from the lumbar spine to the thoracic spine / scapular region.  Patient noted sleep is deprived.  Patient noted she has bilateral lateral hip pain that radiates into the bilateral lateral knees.  Patient noted walking aggravates her bilateral lateral hip pain with intermittent episodes of left lower extremity buckle sensation without falling.  Patient noted stair climbing is going \"ok\" but she noted she limits the stairs as much as possible.  Patient noted bending activities is pain aggravating.  Patient noted bilateral lower extremities are " weak.  Patient noted she has challenges with standing based self, spouse and house hold chores and activities.    Understanding of Dx/Px/POC: excellent     Prognosis: good  Prognosis details: Patient is a 74 y.o. year old female seen for outpatient PT evaluation with a Trochanteric bursitis of both hips [M70.61, M70.62], lumbar radiculopathy and midline thoracic back pain. Patient presents to PT IE with the following problems, concerns, deficits and impairments: bilateral hip, lumbar and thoracic spine region pain, decreased lumbar spine range of motion, decreased bilateral lower extremity mobility and strength, gait and stair dysfunctions, + TTP, + muscle guarding, transfer dysfunctions, functional limitations and decreased tolerance to activity.  Patient would benefit from skilled PT services under the following PT treatment plan to address the above noted deficits: therapeutic exercises and activities to facilitate lumbar spine mobility and bilateral lower extremity mobility and strength, modalities, manual therapy techniques, postural reeducation and strengthening, DLS and abdominal strengthening, traction, gait and stair training, transfer training, balance and proprioception activities, and a hep.  Thank you for the referral.     Goals  Short Term goals 4 - 6 weeks  1.  Patient will be independent HEP.   2.  Patient will report a 25 - 50% decrease in pain complaints.  3.  Increase strength 1/2 grade.  4.  Increase ROM 5-10 degrees.    Long Term goals 8 - 12 weeks  1.  Patient will report elimination of pain complaints.  2.  Patient will return to all work related activities without restriction.  3.  Patient will return to all recreational activities without restriction.  4.  ROM WFL.  5.  Strength 5/5.  6.  Patient will report ability to resume independent self dressing activities without bilateral hip, lumbar or thoracic spine region pain aggravation or limitations.  7.  Patient will report ability to resume  standing house hold chores and activities without bilateral hip, lumbar or thoracic spine region pain aggravation or limitations.  8.  Patient will report ability to resume all transfers without bilateral hip, lumbar or thoracic spine region pain aggravation or limitations.  9.  Patient will report ability to resume all ambulation based self functional activities without bilateral hip, lumbar or thoracic spine region pain aggravation or limitations.  10.  Patient will report ability to perform house hold stairs activities without bilateral hip, lumbar or thoracic spine region pain aggravation or limitations.  11.  Patient will report ability to resume chores and activities that involve bending, squatting and lifting without bilateral hip, lumbar or thoracic spine region pain aggravation or limitations.  12.  Patient will report ability to resume standing based functional activities without bilateral hip, lumbar or thoracic spine region pain aggravation or limitations.    Plan  Patient would benefit from: skilled physical therapy and PT eval  Planned modality interventions: low level laser therapy, manual electrical stimulation, TENS, thermotherapy: hydrocollator packs, ultrasound, unattended electrical stimulation, cryotherapy and electrical stimulation/Russian stimulation    Planned therapy interventions: IASTM, joint mobilization, kinesiology taping, manual therapy, massage, balance, balance/weight bearing training, neuromuscular re-education, postural training, body mechanics training, self care, compression, strengthening, stretching, therapeutic activities, therapeutic exercise, therapeutic training, transfer training, flexibility, functional ROM exercises, gait training, graded activity, graded exercise, graded motor, home exercise program, IADL retraining and aquatic therapy    Frequency: 1-2x week  Duration in weeks: 12  Treatment plan discussed with: patient      Subjective Evaluation    History of Present  Illness  Mechanism of injury: Patient's PMHx is remarkable for HTN, Asthma and Osteoporosis.      LUMBAR SPINE     INDICATION:   Radiculopathy, lumbar region.      COMPARISON:  None.     VIEWS:  XR SPINE LUMBAR MINIMUM 4 VIEWS NON INJURY  Images: 5     FINDINGS:     There are 5 non rib bearing lumbar vertebral bodies.      There is no evidence of acute fracture or destructive osseous lesion.     Trace anterolisthesis L4-5.     L4-5 and L5-S1 disc height loss with advanced lower lumbar facet arthropathy with multilevel vertebral body endplate spurring.     The pedicles appear intact.     There are atherosclerotic calcifications. Soft tissues are otherwise unremarkable.     IMPRESSION:        No acute osseous abnormality.       Degenerative changes as described.       THORACIC SPINE     INDICATION:   Pain in thoracic spine. Other chronic pain.      COMPARISON:  None.     VIEWS:  XR SPINE THORACIC 3 VW  Images: 3     FINDINGS:     There is no fracture or pathologic bone lesion.     Thoracic vertebral alignment is within normal limits.     Age related degenerative changes are seen.      There is no displacement of the paraspinal line.      The pedicles appear intact.     IMPRESSION:        No acute osseous abnormality.  Degenerative changes as described.      LEFT HIP     INDICATION:   Pain in left hip.      COMPARISON:  None.     VIEWS:  XR HIP/PELV 2-3 VWS LEFT  W PELVIS IF PERFORMED   Images: 3     FINDINGS:     There is no acute fracture or dislocation.     Mild left hip osteoarthritis is seen.     No lytic or blastic osseous lesion.     Soft tissues are unremarkable.     Degenerative changes pubic symphysis and visualized lower lumbar spine.     IMPRESSION:        No acute osseous abnormality.     Degenerative changes as described.      Patient Goals  Patient goals for therapy: decreased pain, increased motion, independence with ADLs/IADLs, return to sport/leisure activities, increased strength and improved  balance  Patient goal: Patient's goals of to losen up her tight musculature, to get stronger and resume all activities without bilateral lateral hip, lumbar and thoracic spine pain  Pain  At best pain rating: 3  At worst pain ratin  Location: lumbar, thoracic spine and bilateral hips        Objective     Tenderness     Additional Tenderness Details  Patient is + TTP at bilateral ITB at moderate to severe levels.  Patient is + for severe muscle guarding at bilateral lumbar spine erector spinae musculature.    Active Range of Motion   Cervical/Thoracic Spine       Thoracic    Flexion: 76 degrees  with pain  Extension: 32 degrees      Left lateral flexion: 12 degrees    with pain  Right lateral flexion: 20 degrees  with pain  Left rotation: 30 degrees   Right rotation: 24 degrees  with pain  Left Hip   Flexion: 104 degrees   Abduction: 22 degrees     Right Hip   Flexion: 106 degrees   Abduction: 30 degrees     Additional Active Range of Motion Details  Hamstring mobility on right at 64 and at 68 degrees on left.    Strength/Myotome Testing     Left Hip   Planes of Motion   Flexion: 4  Extension: 4  Abduction: 4  Adduction: 4+    Right Hip   Planes of Motion   Flexion: 4-  Extension: 4  Abduction: 4  Adduction: 4+    Left Knee   Flexion: 4  Extension: 4-    Right Knee   Flexion: 4  Extension: 4-    Left Ankle/Foot   Dorsiflexion: 4+  Plantar flexion: 5    Right Ankle/Foot   Dorsiflexion: 4+  Plantar flexion: 5    Tests     Lumbar     Left   Positive slump test.   Negative passive SLR.     Right   Negative passive SLR and slump test.     Left Pelvic Girdle/Sacrum   Negative: active SLR test.     Right Pelvic Girdle/Sacrum   Negative: active SLR test.     Left Hip   Positive ELODIA and piriformis.     Right Hip   Positive ELODIA.   Negative piriformis.     Ambulation     Ambulation: Level Surfaces   Ambulation with assistive device: independent    Additional Level Surfaces Ambulation Details  Patient ambulates with  decrease in lumbar spine rotation..    Ambulation: Stairs   Ascend stairs: independent  Pattern: reciprocal  Railings: two rails  Descend stairs: independent  Pattern: non-reciprocal  Railings: two rails             Precautions: Patient's PMHx is remarkable for HTN, Asthma and Osteoporosis.      Manuals 3/31            IASTM to bilateral lateral hip and ITB in side lying   10 min                                                   Neuro Re-Ed                                                                                                        Ther Ex                          Nu step             Standing lumbar spine extension against counter 10 x 2 hep            Standing ITB stretch:B:  add           Standing hip flexor stretch:B:                          LAQ:B:             Hip flexion seated:B:                          Hamstring stretch:B             LTR:B:             Piriformis stretch:B:             DLS abdominal bracing in hook lying             DLS abdominal bracing in hook lying with hip flexion:B:             DLS abdominal bracing in hook lying with slr flexion:B:             bridges             Side lying hip abduction:B:             Prone lying             Prone press ups                          Mini squats             Lunges on foam airex             Tband rows and extension:B             Tband Paloff Press                                                                 Ther Activity                                       Gait Training                                       Modalities                          MHP to bilateral lateral hips in hook lying or seatd

## 2025-04-02 ENCOUNTER — OFFICE VISIT (OUTPATIENT)
Dept: PHYSICAL THERAPY | Age: 75
End: 2025-04-02
Payer: MEDICARE

## 2025-04-02 DIAGNOSIS — M70.62 TROCHANTERIC BURSITIS OF BOTH HIPS: Primary | ICD-10-CM

## 2025-04-02 DIAGNOSIS — M70.61 TROCHANTERIC BURSITIS OF BOTH HIPS: Primary | ICD-10-CM

## 2025-04-02 PROCEDURE — 97110 THERAPEUTIC EXERCISES: CPT

## 2025-04-02 NOTE — PROGRESS NOTES
"Daily Note     Today's date: 2025  Patient name: Jaye Jasso  : 1950  MRN: [id number]  Referring provider: Richard Aguilar*  Dx:   Encounter Diagnosis     ICD-10-CM    1. Trochanteric bursitis of both hips  M70.61     M70.62                      Subjective: Pt noted thoracic and L hip pain \"it makes it hard to push my 's wheelchair\"       Objective: See treatment diary below      Assessment: Decreased thoracic pain and L hip pain after manual work and modalities.       Plan: Cont with plan of care      Precautions: Patient's PMHx is remarkable for HTN, Asthma and Osteoporosis.      Manuals 3/31 4/2           IASTM to bilateral lateral hip and ITB in side lying      And STM to thoracic spine 10 min 10 min                                                   Neuro Re-Ed                                                                                                        Ther Ex                          Nu step  10 min            Standing lumbar spine extension against counter 10 x 2 hep 20x            Standing ITB stretch:B:  20sec 5x            Standing hip flexor stretch:B:  20SEC 5X                        LAQ:B:  NT           Hip flexion seated:B:  NT           Seated thoracic ext with half roll   NT           Hamstring stretch:B             LTR:B:  20x 2sec            Piriformis stretch:B:  20sec 3x            DLS abdominal bracing in hook lying  20X           DLS abdominal bracing in hook lying with hip flexion:B:  20X            DLS abdominal bracing in hook lying with slr flexion:B:  20X            bridges  15x            Side lying hip abduction:B:  NT           Prone lying  NT           Prone press ups             Prone on elbows   2 min            Mini squats  NT           Lunges on foam airex  NT           Tband rows and extension:B  NT           Tband Paloff Press  NT                                                               Ther Activity                                "        Gait Training                                       Modalities                          MHP to bilateral lateral hips in hook lying or seatd  Prone to thoracic/ lumbar and B hips

## 2025-04-08 ENCOUNTER — APPOINTMENT (OUTPATIENT)
Dept: PHYSICAL THERAPY | Age: 75
End: 2025-04-08
Payer: MEDICARE

## 2025-04-09 ENCOUNTER — OFFICE VISIT (OUTPATIENT)
Dept: PHYSICAL THERAPY | Age: 75
End: 2025-04-09
Payer: MEDICARE

## 2025-04-09 DIAGNOSIS — M70.61 TROCHANTERIC BURSITIS OF BOTH HIPS: Primary | ICD-10-CM

## 2025-04-09 DIAGNOSIS — M70.62 TROCHANTERIC BURSITIS OF BOTH HIPS: Primary | ICD-10-CM

## 2025-04-09 PROCEDURE — 97110 THERAPEUTIC EXERCISES: CPT

## 2025-04-09 PROCEDURE — 97140 MANUAL THERAPY 1/> REGIONS: CPT

## 2025-04-11 ENCOUNTER — OFFICE VISIT (OUTPATIENT)
Dept: PHYSICAL THERAPY | Age: 75
End: 2025-04-11
Payer: MEDICARE

## 2025-04-11 DIAGNOSIS — M70.62 TROCHANTERIC BURSITIS OF BOTH HIPS: Primary | ICD-10-CM

## 2025-04-11 DIAGNOSIS — M70.61 TROCHANTERIC BURSITIS OF BOTH HIPS: Primary | ICD-10-CM

## 2025-04-11 DIAGNOSIS — M54.6 CHRONIC MIDLINE THORACIC BACK PAIN: ICD-10-CM

## 2025-04-11 DIAGNOSIS — G89.29 CHRONIC MIDLINE THORACIC BACK PAIN: ICD-10-CM

## 2025-04-11 DIAGNOSIS — M54.16 LUMBAR RADICULOPATHY: ICD-10-CM

## 2025-04-11 PROCEDURE — 97110 THERAPEUTIC EXERCISES: CPT | Performed by: PHYSICAL THERAPIST

## 2025-04-11 PROCEDURE — 97140 MANUAL THERAPY 1/> REGIONS: CPT | Performed by: PHYSICAL THERAPIST

## 2025-04-11 NOTE — PROGRESS NOTES
Daily Note     Today's date: 2025  Patient name: Jaye Jasso  : 1950  MRN: [id number]  Referring provider: Richard Aguilar*  Dx:   Encounter Diagnosis     ICD-10-CM    1. Trochanteric bursitis of both hips  M70.61     M70.62       2. Lumbar radiculopathy  M54.16       3. Chronic midline thoracic back pain  M54.6     G89.29                        Subjective: Patient reported she felt better after last PT visit with minimal pain today due to compliance of lumbar spine extension hep.      Objective: See treatment diary below      Assessment: Patient exhibits lumbar and thoracic spine extension bias towards short term pain reduction.  But, she lacks long term pain reduction that limits standing based functional activities.  Thus, PT is warranted to facilitate long term pain reduction in lumbar and thoracic spine region and functional progress with return to all self and house hold chores and activities without symptom limitations.      Plan: Cont with plan of care      Precautions: Patient's PMHx is remarkable for HTN, Asthma and Osteoporosis.      Manuals 3/31 4/2 4/9 4/11         IASTM to bilateral lateral hip and ITB in side lying      And STM to thoracic spine 10 min 10 min  10 min  10 min                                                Neuro Re-Ed                                                    Ther Ex                          Nu step  10 min  10 min  10 min         Standing lumbar spine extension against counter 10 x 2 hep 20x  30x 3 x 10         Standing ITB stretch:B:  20sec 5x  20sec 5x 20 sec x 5         Standing hip flexor stretch:B:  20SEC 5X NT NT                      LAQ:B:  NT 20X  2 x 10         Hip flexion seated:B:  NT NT 2 x 10         Seated thoracic ext with half roll   NT NT 2 x 10         Hamstring stretch:B   20SEC 5X  20 sec x 5         LTR:B:  20x 2sec  20X 3SEC  10 sec x 5         Piriformis stretch:B:  20sec 3x  20SEC 5X  20 sec x 5         DLS abdominal  bracing in hook lying  20X 20X  NT         DLS abdominal bracing in hook lying with hip flexion:B:  20X  20X  NT         DLS abdominal bracing in hook lying with slr flexion:B:  20X  20X  NT         bridges  15x  20X  NT         Side lying hip abduction:B:  NT NT NT         Prone lying  NT NT 2 min         Prone press ups   NT 2 x 10         Prone on elbows   2 min  NT NT                      Mini squats  NT 20X 2 x 10         Lunges on foam airex  NT NT 2 x 10         Tband rows and extension:B  NT NT NT         Tband Paloff Press  NT NT NT         SLRX 3   20X  2 x 10                                                Ther Activity                                       Gait Training                                       Modalities                          MHP to bilateral lateral hips in hook lying or seatd  Prone to thoracic/ lumbar and B hips   Prone x 10 min

## 2025-04-14 ENCOUNTER — APPOINTMENT (OUTPATIENT)
Dept: PHYSICAL THERAPY | Age: 75
End: 2025-04-14
Attending: PAIN MEDICINE
Payer: MEDICARE

## 2025-04-17 ENCOUNTER — OFFICE VISIT (OUTPATIENT)
Dept: PHYSICAL THERAPY | Age: 75
End: 2025-04-17
Payer: MEDICARE

## 2025-04-17 DIAGNOSIS — M54.6 CHRONIC MIDLINE THORACIC BACK PAIN: ICD-10-CM

## 2025-04-17 DIAGNOSIS — M70.61 TROCHANTERIC BURSITIS OF BOTH HIPS: Primary | ICD-10-CM

## 2025-04-17 DIAGNOSIS — G89.29 CHRONIC MIDLINE THORACIC BACK PAIN: ICD-10-CM

## 2025-04-17 DIAGNOSIS — M54.16 LUMBAR RADICULOPATHY: ICD-10-CM

## 2025-04-17 DIAGNOSIS — M70.62 TROCHANTERIC BURSITIS OF BOTH HIPS: Primary | ICD-10-CM

## 2025-04-17 PROCEDURE — 97140 MANUAL THERAPY 1/> REGIONS: CPT | Performed by: PHYSICAL THERAPIST

## 2025-04-17 PROCEDURE — 97110 THERAPEUTIC EXERCISES: CPT | Performed by: PHYSICAL THERAPIST

## 2025-04-17 NOTE — PROGRESS NOTES
Daily Note     Today's date: 2025  Patient name: Jaye Jasso  : 1950  MRN: [id number]  Referring provider: Richard Aguilar*  Dx:   Encounter Diagnosis     ICD-10-CM    1. Trochanteric bursitis of both hips  M70.61     M70.62       2. Lumbar radiculopathy  M54.16       3. Chronic midline thoracic back pain  M54.6     G89.29                        Subjective: Patient reported reported she has lumbar spine pain at a 6 of 10, while minimal bilateral ITB region pain persists.      Objective: See treatment diary below      Assessment: Patient presents with short term lumbar spine and bilateral lateral hip and ITB region pain with combination of ITB and lumbar spine manual therapy techniques and repeated lumbar spine extension directional preference.  But, she exhibits only short term pain reduction since current self, house hold iadls and activities and taking care of her  is pain and functional aggravation and limiting respectively.  Thus, PT is warranted to facilitate long term pain reduction in lumbar and thoracic spine region and functional progress with return to all self and house hold chores and activities without symptom limitations.      Plan: Cont with plan of care      Precautions: Patient's PMHx is remarkable for HTN, Asthma and Osteoporosis.      Manuals 3/31 4/2 4/9 4/11 4/17        IASTM to bilateral lateral hip and ITB in side lying      And STM to thoracic spine 10 min 10 min  10 min  10 min 10 min                                               Neuro Re-Ed                                                    Ther Ex                          Nu step  10 min  10 min  10 min 10 min        Standing lumbar spine extension against counter 10 x 2 hep 20x  30x 3 x 10 3 x 10        Standing ITB stretch:B:  20sec 5x  20sec 5x 20 sec x 5 20 sec x 5        Standing hip flexor stretch:B:  20SEC 5X NT NT NT                     LAQ:B:  NT 20X  2 x 10 2# x 20        Hip flexion seated:B:   NT NT 2 x 10 2# x 20        Seated thoracic ext with half roll   NT NT 2 x 10         Hamstring stretch:B   20SEC 5X  20 sec x 5 20 sec x 5        LTR:B:  20x 2sec  20X 3SEC  10 sec x 5 10 sec x 5        Piriformis stretch:B:  20sec 3x  20SEC 5X  20 sec x 5 20 sec x 5        DLS abdominal bracing in hook lying  20X 20X  NT NT        DLS abdominal bracing in hook lying with hip flexion:B:  20X  20X  NT NT        DLS abdominal bracing in hook lying with slr flexion:B:  20X  20X  NT NT        bridges  15x  20X  NT NT        Side lying hip abduction:B:  NT NT NT NT        Prone lying  NT NT 2 min 2 min        Prone press ups   NT 2 x 10 NT resume       Prone on elbows   2 min  NT NT NT                     Mini squats  NT 20X 2 x 10 2 x 10         Lunges on foam airex  NT NT 2 x 10 2 x 10        Tband rows and extension:B  NT NT NT NT        Tband Paloff Press  NT NT NT NT        SLRX 3   20X  2 x 10 NT                                               Ther Activity                                       Gait Training                                       Modalities                          MHP to bilateral lateral hips in hook lying or seatd  Prone to thoracic/ lumbar and B hips   Prone x 10 min

## 2025-04-22 ENCOUNTER — APPOINTMENT (OUTPATIENT)
Dept: PHYSICAL THERAPY | Age: 75
End: 2025-04-22
Payer: MEDICARE

## 2025-04-23 ENCOUNTER — OFFICE VISIT (OUTPATIENT)
Dept: PHYSICAL THERAPY | Age: 75
End: 2025-04-23
Attending: PAIN MEDICINE
Payer: MEDICARE

## 2025-04-23 DIAGNOSIS — M70.61 TROCHANTERIC BURSITIS OF BOTH HIPS: Primary | ICD-10-CM

## 2025-04-23 DIAGNOSIS — M70.62 TROCHANTERIC BURSITIS OF BOTH HIPS: Primary | ICD-10-CM

## 2025-04-23 PROCEDURE — 97140 MANUAL THERAPY 1/> REGIONS: CPT

## 2025-04-23 PROCEDURE — 97110 THERAPEUTIC EXERCISES: CPT

## 2025-04-23 NOTE — PROGRESS NOTES
"Daily Note     Today's date: 2025  Patient name: Jaye Jasso  : 1950  MRN: [id number]  Referring provider: Richard Aguilar  Dx:   Encounter Diagnosis     ICD-10-CM    1. Trochanteric bursitis of both hips  M70.61     M70.62                          Subjective: Patient reported \"I am feeling pretty good, my low back is sore after I exercise\"       Objective: See treatment diary below      Assessment: Good tolerance to exercises. Progress as able       Plan: Cont with plan of care      Precautions: Patient's PMHx is remarkable for HTN, Asthma and Osteoporosis.      Manuals 3/31 4/2 4/9 4/11 4/17 4/23        IASTM to bilateral lateral hip and ITB in side lying      And STM to thoracic spine 10 min 10 min  10 min  10 min 10 min 10 min                                               Neuro Re-Ed                                                    Ther Ex                          Nu step  10 min  10 min  10 min 10 min 10 min        Standing lumbar spine extension against counter 10 x 2 hep 20x  30x 3 x 10 3 x 10 30x       Standing ITB stretch:B:  20sec 5x  20sec 5x 20 sec x 5 20 sec x 5 20sec 5x        Standing hip flexor stretch:B:  20SEC 5X NT NT NT NT                    LAQ:B:  NT 20X  2 x 10 2# x 20 20X 3#        Hip flexion seated:B:  NT NT 2 x 10 2# x 20 20X 3#        Seated thoracic ext with half roll   NT NT 2 x 10  NT       Hamstring stretch:B   20SEC 5X  20 sec x 5 20 sec x 5 20SEC 5X        LTR:B:  20x 2sec  20X 3SEC  10 sec x 5 10 sec x 5 20X 3SEC        Piriformis stretch:B:  20sec 3x  20SEC 5X  20 sec x 5 20 sec x 5 20SEC 5X        DLS abdominal bracing in hook lying  20X 20X  NT NT NT       DLS abdominal bracing in hook lying with hip flexion:B:  20X  20X  NT NT 20X 3#        DLS abdominal bracing in hook lying with slr flexion:B:  20X  20X  NT NT 20X 3#        bridges  15x  20X  NT NT 20X 3SEC        Side lying hip abduction:B:  NT NT NT NT 20X 3#        Prone lying  NT NT 2 min " 2 min NT       Prone press ups   NT 2 x 10 NT 20X       Prone on elbows   2 min  NT NT NT 2 MIN                     Mini squats  NT 20X 2 x 10 2 x 10  20X       Lunges on foam airex  NT NT 2 x 10 2 x 10 20X       Tband rows and extension:B  NT NT NT NT NT       Tband Paloff Press  NT NT NT NT NT       SLRX 3   20X  2 x 10 NT 20X 3#                                               Ther Activity                                       Gait Training                                       Modalities                          MHP to bilateral lateral hips in hook lying or seatd  Prone to thoracic/ lumbar and B hips   Prone x 10 min  10 MIN   PRONE

## 2025-04-25 ENCOUNTER — OFFICE VISIT (OUTPATIENT)
Dept: PHYSICAL THERAPY | Age: 75
End: 2025-04-25
Attending: PAIN MEDICINE
Payer: MEDICARE

## 2025-04-25 DIAGNOSIS — M70.62 TROCHANTERIC BURSITIS OF BOTH HIPS: Primary | ICD-10-CM

## 2025-04-25 DIAGNOSIS — M70.61 TROCHANTERIC BURSITIS OF BOTH HIPS: Primary | ICD-10-CM

## 2025-04-25 PROCEDURE — 97140 MANUAL THERAPY 1/> REGIONS: CPT

## 2025-04-25 PROCEDURE — 97110 THERAPEUTIC EXERCISES: CPT

## 2025-04-25 NOTE — PROGRESS NOTES
"Daily Note     Today's date: 2025  Patient name: Jaye Jasso  : 1950  MRN: [id number]  Referring provider: Richard Aguilar  Dx:   Encounter Diagnosis     ICD-10-CM    1. Trochanteric bursitis of both hips  M70.61     M70.62                            Subjective: Patient reported \"my upper back gets sore\"       Objective: See treatment diary below      Assessment: Decreased thoracic/lumbar spine symptoms with improved tolerance to exercises. Progress as able       Plan: Cont with plan of care      Precautions: Patient's PMHx is remarkable for HTN, Asthma and Osteoporosis.      Manuals 3/31 4/2 4/9 4/11 4/17 4/23  4/25      IASTM to bilateral lateral hip and ITB in side lying      And STM to thoracic spine 10 min 10 min  10 min  10 min 10 min 10 min  10 min                                              Neuro Re-Ed                                                    Ther Ex                          Nu step  10 min  10 min  10 min 10 min 10 min  10 min       Standing lumbar spine extension against counter 10 x 2 hep 20x  30x 3 x 10 3 x 10 30x 20x       Standing ITB stretch:B:  20sec 5x  20sec 5x 20 sec x 5 20 sec x 5 20sec 5x  20 sec5x       Standing hip flexor stretch:B:  20SEC 5X NT NT NT NT 20SEC 5X                    LAQ:B:  NT 20X  2 x 10 2# x 20 20X 3#  NT      Hip flexion seated:B:  NT NT 2 x 10 2# x 20 20X 3#  NT      Seated thoracic ext with half roll   NT NT 2 x 10  NT NT      Hamstring stretch:B   20SEC 5X  20 sec x 5 20 sec x 5 20SEC 5X  20SEC 5X       LTR:B:  20x 2sec  20X 3SEC  10 sec x 5 10 sec x 5 20X 3SEC  20X 3SEC       Piriformis stretch:B:  20sec 3x  20SEC 5X  20 sec x 5 20 sec x 5 20SEC 5X  20SEC 5X       DLS abdominal bracing in hook lying  20X 20X  NT NT NT 20X 3SEC       DLS abdominal bracing in hook lying with hip flexion:B:  20X  20X  NT NT 20X 3#  20X 3#       DLS abdominal bracing in hook lying with slr flexion:B:  20X  20X  NT NT 20X 3#  20X 3SEC       bridges  " 15x  20X  NT NT 20X 3SEC  20x 2sec       Side lying hip abduction:B:  NT NT NT NT 20X 3#  NT      Prone lying  NT NT 2 min 2 min NT NT      Prone press ups   NT 2 x 10 NT 20X 20X       Prone on elbows   2 min  NT NT NT 2 MIN  3 MIN                    Mini squats  NT 20X 2 x 10 2 x 10  20X 20X      Lunges on foam airex  NT NT 2 x 10 2 x 10 20X 20X       Tband rows and extension:B  NT NT NT NT NT NT      Tband Paloff Press  NT NT NT NT NT NT      SLRX 3   20X  2 x 10 NT 20X 3#  20X 3#                                              Ther Activity                                       Gait Training                                       Modalities                          MHP to bilateral lateral hips in hook lying or seatd  Prone to thoracic/ lumbar and B hips   Prone x 10 min  10 MIN   PRONE  10 MIN

## 2025-04-28 ENCOUNTER — OFFICE VISIT (OUTPATIENT)
Dept: PHYSICAL THERAPY | Age: 75
End: 2025-04-28
Attending: PAIN MEDICINE
Payer: MEDICARE

## 2025-04-28 DIAGNOSIS — F33.1 MAJOR DEPRESSIVE DISORDER, RECURRENT EPISODE, MODERATE (HCC): ICD-10-CM

## 2025-04-28 DIAGNOSIS — M70.61 TROCHANTERIC BURSITIS OF BOTH HIPS: Primary | ICD-10-CM

## 2025-04-28 DIAGNOSIS — M70.62 TROCHANTERIC BURSITIS OF BOTH HIPS: Primary | ICD-10-CM

## 2025-04-28 DIAGNOSIS — M54.16 LUMBAR RADICULOPATHY: ICD-10-CM

## 2025-04-28 DIAGNOSIS — G89.29 CHRONIC MIDLINE THORACIC BACK PAIN: ICD-10-CM

## 2025-04-28 DIAGNOSIS — M54.6 CHRONIC MIDLINE THORACIC BACK PAIN: ICD-10-CM

## 2025-04-28 PROCEDURE — 97110 THERAPEUTIC EXERCISES: CPT

## 2025-04-28 PROCEDURE — 97140 MANUAL THERAPY 1/> REGIONS: CPT

## 2025-04-28 NOTE — PROGRESS NOTES
Daily Note     Today's date: 2025  Patient name: Jaye Jasso  : 1950  MRN: [id number]  Referring provider: Richard Aguilar*  Dx:   Encounter Diagnosis     ICD-10-CM    1. Trochanteric bursitis of both hips  M70.61     M70.62       2. Lumbar radiculopathy  M54.16       3. Chronic midline thoracic back pain  M54.6     G89.29       4. Major depressive disorder, recurrent episode, moderate (HCC)  F33.1                      Subjective: Patient states she feels stiffness in her thoracic spine getting up in the morning.       Objective: See treatment diary below      Assessment: Tolerated treatment well. Some relief with additional exercise with half roll seated for her thoracic spine. Reviewed HEP with patient as well. Patient shows good compliance with her program. No other increased symptoms post treatment. Patient would benefit from continued PT      Plan: Continue per plan of care.      Precautions: Patient's PMHx is remarkable for HTN, Asthma and Osteoporosis.      Manuals 3/31 4/2 4/9 4/11 4/17 4/23  4/25 4/28     IASTM to bilateral lateral hip and ITB in side lying      And STM to thoracic spine 10 min 10 min  10 min  10 min 10 min 10 min  10 min  X 10 min                                             Neuro Re-Ed                                                    Ther Ex                          Nu step  10 min  10 min  10 min 10 min 10 min  10 min  X 10 min      Standing lumbar spine extension against counter 10 x 2 hep 20x  30x 3 x 10 3 x 10 30x 20x  X 20      Standing ITB stretch:B:  20sec 5x  20sec 5x 20 sec x 5 20 sec x 5 20sec 5x  20 sec5x  20 sec x 5     Standing hip flexor stretch:B:  20SEC 5X NT NT NT NT 20SEC 5X  20 sec x 5                   LAQ:B:  NT 20X  2 x 10 2# x 20 20X 3#  NT      Hip flexion seated:B:  NT NT 2 x 10 2# x 20 20X 3#  NT      Seated thoracic ext with half roll   NT NT 2 x 10  NT NT      Hamstring stretch:B   20SEC 5X  20 sec x 5 20 sec x 5 20SEC 5X  20SEC  5X  20 sec x 5      LTR:B:  20x 2sec  20X 3SEC  10 sec x 5 10 sec x 5 20X 3SEC  20X 3SEC  3 sec x 20      Piriformis stretch:B:  20sec 3x  20SEC 5X  20 sec x 5 20 sec x 5 20SEC 5X  20SEC 5X  20 sec x 5      DLS abdominal bracing in hook lying  20X 20X  NT NT NT 20X 3SEC  20 sec x 3     DLS abdominal bracing in hook lying with hip flexion:B:  20X  20X  NT NT 20X 3#  20X 3#  3#x 20      DLS abdominal bracing in hook lying with slr flexion:B:  20X  20X  NT NT 20X 3#  20X 3SEC  20 s 3 sec      bridges  15x  20X  NT NT 20X 3SEC  20x 2sec  20 x 2 sec x      Side lying hip abduction:B:  NT NT NT NT 20X 3#  NT      Prone lying  NT NT 2 min 2 min NT NT      Prone press ups   NT 2 x 10 NT 20X 20X  X 20      Prone on elbows   2 min  NT NT NT 2 MIN  3 MIN  X 3 min                   Mini squats  NT 20X 2 x 10 2 x 10  20X 20X X 20      Lunges on foam airex  NT NT 2 x 10 2 x 10 20X 20X  X 20      Tband rows and extension:B  NT NT NT NT NT NT      Tband Paloff Press  NT NT NT NT NT NT      SLRX 3   20X  2 x 10 NT 20X 3#  20X 3#  3# x 20                                             Ther Activity                                       Gait Training                                       Modalities                          MHP to bilateral lateral hips in hook lying or seatd  Prone to thoracic/ lumbar and B hips   Prone x 10 min  10 MIN   PRONE  10 MIN  X 20 min Prone

## 2025-05-01 ENCOUNTER — OFFICE VISIT (OUTPATIENT)
Dept: PHYSICAL THERAPY | Age: 75
End: 2025-05-01
Attending: PAIN MEDICINE
Payer: MEDICARE

## 2025-05-01 DIAGNOSIS — M70.61 TROCHANTERIC BURSITIS OF BOTH HIPS: Primary | ICD-10-CM

## 2025-05-01 DIAGNOSIS — M54.6 CHRONIC MIDLINE THORACIC BACK PAIN: ICD-10-CM

## 2025-05-01 DIAGNOSIS — F33.1 MAJOR DEPRESSIVE DISORDER, RECURRENT EPISODE, MODERATE (HCC): ICD-10-CM

## 2025-05-01 DIAGNOSIS — G89.29 CHRONIC MIDLINE THORACIC BACK PAIN: ICD-10-CM

## 2025-05-01 DIAGNOSIS — M70.62 TROCHANTERIC BURSITIS OF BOTH HIPS: Primary | ICD-10-CM

## 2025-05-01 DIAGNOSIS — M54.16 LUMBAR RADICULOPATHY: ICD-10-CM

## 2025-05-01 PROCEDURE — 97110 THERAPEUTIC EXERCISES: CPT

## 2025-05-01 PROCEDURE — 97140 MANUAL THERAPY 1/> REGIONS: CPT

## 2025-05-01 NOTE — PROGRESS NOTES
Daily Note     Today's date: 2025  Patient name: Jaye Jasso  : 1950  MRN: [id number]  Referring provider: Richard Aguilar*  Dx:   Encounter Diagnosis     ICD-10-CM    1. Trochanteric bursitis of both hips  M70.61     M70.62       2. Lumbar radiculopathy  M54.16       3. Chronic midline thoracic back pain  M54.6     G89.29       4. Major depressive disorder, recurrent episode, moderate (HCC)  F33.1                      Subjective: Patient states she hd a sinus headache this morning and doesn't feel too great.       Objective: See treatment diary below      Assessment: Tolerated treatment fair. Some resting required and fatigue post session.  Did not feel so well throughout most of session. Patient would benefit from continued PT      Plan: Continue per plan of care.      Precautions: Patient's PMHx is remarkable for HTN, Asthma and Osteoporosis.      Manuals 3/31 4/2 4/9 4/11 4/17 4/23  4/25 4/28 5/1    IASTM to bilateral lateral hip and ITB in side lying      And STM to thoracic spine 10 min 10 min  10 min  10 min 10 min 10 min  10 min  X 10 min  X 10 -GF                                           Neuro Re-Ed                                                    Ther Ex                          Nu step  10 min  10 min  10 min 10 min 10 min  10 min  X 10 min  X 10 min L 1    Standing lumbar spine extension against counter 10 x 2 hep 20x  30x 3 x 10 3 x 10 30x 20x  X 20  2 x 10     Standing ITB stretch:B:  20sec 5x  20sec 5x 20 sec x 5 20 sec x 5 20sec 5x  20 sec5x  20 sec x 5 20 sec x 5     Standing hip flexor stretch:B:  20SEC 5X NT NT NT NT 20SEC 5X  20 sec x 5  20 sec x 5                  LAQ:B:  NT 20X  2 x 10 2# x 20 20X 3#  NT      Hip flexion seated:B:  NT NT 2 x 10 2# x 20 20X 3#  NT      Seated thoracic ext with half roll   NT NT 2 x 10  NT NT      Hamstring stretch:B   20SEC 5X  20 sec x 5 20 sec x 5 20SEC 5X  20SEC 5X  20 sec x 5  20 sec x 5     LTR:B:  20x 2sec  20X 3SEC  10 sec  x 5 10 sec x 5 20X 3SEC  20X 3SEC  3 sec x 20  3 sec x 20     Piriformis stretch:B:  20sec 3x  20SEC 5X  20 sec x 5 20 sec x 5 20SEC 5X  20SEC 5X  20 sec x 5  20 sec x 5     DLS abdominal bracing in hook lying  20X 20X  NT NT NT 20X 3SEC  20 x 3 sec  20  x 3 sec     DLS abdominal bracing in hook lying with hip flexion:B:  20X  20X  NT NT 20X 3#  20X 3#  x 20  3 x 10     DLS abdominal bracing in hook lying with slr flexion:B:  20X  20X  NT NT 20X 3#  20X 3SEC  20 x 3 sec  20 x 3 sec     bridges  15x  20X  NT NT 20X 3SEC  20x 2sec  20 x 2 sec x  20 x 2 sec     Side lying hip abduction:B:  NT NT NT NT 20X 3#  NT      Prone lying  NT NT 2 min 2 min NT NT      Prone press ups   NT 2 x 10 NT 20X 20X  X 20  2 x 10     Prone on elbows   2 min  NT NT NT 2 MIN  3 MIN  X 3 min  X 3 min                  Mini squats  NT 20X 2 x 10 2 x 10  20X 20X X 20  2 x 10     Lunges on foam airex  NT NT 2 x 10 2 x 10 20X 20X  X 20  2 x 10     Tband rows and extension:B  NT NT NT NT NT NT      Tband Paloff Press  NT NT NT NT NT NT      SLRX 3   20X  2 x 10 NT 20X 3#  20X 3#  3# x 20  3# 2 x 10                                            Ther Activity                                       Gait Training                                       Modalities                          MHP to bilateral lateral hips in hook lying or seatd  Prone to thoracic/ lumbar and B hips   Prone x 10 min  10 MIN   PRONE  10 MIN  X 10 min Prone  X 10 min prone

## 2025-05-05 ENCOUNTER — HOSPITAL ENCOUNTER (OUTPATIENT)
Dept: RADIOLOGY | Age: 75
Discharge: HOME | End: 2025-05-05
Attending: PAIN MEDICINE
Payer: MEDICARE

## 2025-05-05 ENCOUNTER — APPOINTMENT (OUTPATIENT)
Dept: RADIOLOGY | Age: 75
End: 2025-05-05
Payer: MEDICARE

## 2025-05-05 DIAGNOSIS — G89.29 CHRONIC MIDLINE THORACIC BACK PAIN: ICD-10-CM

## 2025-05-05 DIAGNOSIS — M54.16 LUMBAR RADICULOPATHY: ICD-10-CM

## 2025-05-05 DIAGNOSIS — M54.6 CHRONIC MIDLINE THORACIC BACK PAIN: ICD-10-CM

## 2025-05-05 PROCEDURE — 72146 MRI CHEST SPINE W/O DYE: CPT

## 2025-05-05 PROCEDURE — 72148 MRI LUMBAR SPINE W/O DYE: CPT

## 2025-05-08 ENCOUNTER — OFFICE VISIT (OUTPATIENT)
Dept: PHYSICAL THERAPY | Age: 75
End: 2025-05-08
Attending: PAIN MEDICINE
Payer: MEDICARE

## 2025-05-08 DIAGNOSIS — M70.62 TROCHANTERIC BURSITIS OF BOTH HIPS: Primary | ICD-10-CM

## 2025-05-08 DIAGNOSIS — M70.61 TROCHANTERIC BURSITIS OF BOTH HIPS: Primary | ICD-10-CM

## 2025-05-08 PROCEDURE — 97140 MANUAL THERAPY 1/> REGIONS: CPT

## 2025-05-08 PROCEDURE — 97110 THERAPEUTIC EXERCISES: CPT

## 2025-05-08 NOTE — PROGRESS NOTES
"Daily Note     Today's date: 2025  Patient name: Jaye Jasso  : 1950  MRN: [id number]  Referring provider: Richard Aguilar  Dx:   Encounter Diagnosis     ICD-10-CM    1. Trochanteric bursitis of both hips  M70.61     M70.62                          Subjective: Patient stated \"my upper back is sore today\"       Objective: See treatment diary below      Assessment: Decreased thoracic/ B UT symptoms with manual work and modalities.       Plan: Continue per plan of care.      Precautions: Patient's PMHx is remarkable for HTN, Asthma and Osteoporosis.      Manuals 3/31 4/2 4/9 4/11 4/17 4/23  4/25 4/28 5/1 5/8   IASTM to bilateral lateral hip and ITB in side lying      And STM to thoracic spine 10 min 10 min  10 min  10 min 10 min 10 min  10 min  X 10 min  X 10 -GF 10 min                                           Neuro Re-Ed                                                    Ther Ex                          Nu step  10 min  10 min  10 min 10 min 10 min  10 min  X 10 min  X 10 min L 1 10 MIN    Standing lumbar spine extension against counter 10 x 2 hep 20x  30x 3 x 10 3 x 10 30x 20x  X 20  2 x 10  20X    Standing ITB stretch:B:  20sec 5x  20sec 5x 20 sec x 5 20 sec x 5 20sec 5x  20 sec5x  20 sec x 5 20 sec x 5  20SEC 5X    Standing hip flexor stretch:B:  20SEC 5X NT NT NT NT 20SEC 5X  20 sec x 5  20 sec x 5  20SEC 5X                 LAQ:B:  NT 20X  2 x 10 2# x 20 20X 3#  NT   NT   Hip flexion seated:B:  NT NT 2 x 10 2# x 20 20X 3#  NT   NT   Seated thoracic ext with half roll   NT NT 2 x 10  NT NT   NT   Hamstring stretch:B   20SEC 5X  20 sec x 5 20 sec x 5 20SEC 5X  20SEC 5X  20 sec x 5  20 sec x 5  20SEC 5X    LTR:B:  20x 2sec  20X 3SEC  10 sec x 5 10 sec x 5 20X 3SEC  20X 3SEC  3 sec x 20  3 sec x 20  20SEC 5X    Piriformis stretch:B:  20sec 3x  20SEC 5X  20 sec x 5 20 sec x 5 20SEC 5X  20SEC 5X  20 sec x 5  20 sec x 5  20SEC 5X    DLS abdominal bracing in hook lying  20X 20X  NT NT NT 20X " 3SEC  20 x 3 sec  20  x 3 sec  20SEC 5X    DLS abdominal bracing in hook lying with hip flexion:B:  20X  20X  NT NT 20X 3#  20X 3#  x 20  3 x 10  20x 3#    DLS abdominal bracing in hook lying with slr flexion:B:  20X  20X  NT NT 20X 3#  20X 3SEC  20 x 3 sec  20 x 3 sec  20x 3#    bridges  15x  20X  NT NT 20X 3SEC  20x 2sec  20 x 2 sec x  20 x 2 sec  20x 3sec    Side lying hip abduction:B:  NT NT NT NT 20X 3#  NT   NT   Prone lying  NT NT 2 min 2 min NT NT   NT   Prone press ups   NT 2 x 10 NT 20X 20X  X 20  2 x 10  20X    Prone on elbows   2 min  NT NT NT 2 MIN  3 MIN  X 3 min  X 3 min  3 MIN                 Mini squats  NT 20X 2 x 10 2 x 10  20X 20X X 20  2 x 10  20X    Lunges on foam airex  NT NT 2 x 10 2 x 10 20X 20X  X 20  2 x 10  20X    Tband rows and extension:B  NT NT NT NT NT NT   NT   Tband Paloff Press  NT NT NT NT NT NT   NT   SLRX 3   20X  2 x 10 NT 20X 3#  20X 3#  3# x 20  3# 2 x 10  20X 3#                                           Ther Activity                                       Gait Training                                       Modalities                          MHP to bilateral lateral hips in hook lying or seatd  Prone to thoracic/ lumbar and B hips   Prone x 10 min  10 MIN   PRONE  10 MIN  X 10 min Prone  X 10 min prone  10 MIN     LB  /thoracic/ B ut

## 2025-05-12 ENCOUNTER — OFFICE VISIT (OUTPATIENT)
Dept: PHYSICAL THERAPY | Age: 75
End: 2025-05-12
Payer: MEDICARE

## 2025-05-12 DIAGNOSIS — M54.16 LUMBAR RADICULOPATHY: ICD-10-CM

## 2025-05-12 DIAGNOSIS — M54.6 CHRONIC MIDLINE THORACIC BACK PAIN: ICD-10-CM

## 2025-05-12 DIAGNOSIS — M70.62 TROCHANTERIC BURSITIS OF BOTH HIPS: Primary | ICD-10-CM

## 2025-05-12 DIAGNOSIS — G89.29 CHRONIC MIDLINE THORACIC BACK PAIN: ICD-10-CM

## 2025-05-12 DIAGNOSIS — M70.61 TROCHANTERIC BURSITIS OF BOTH HIPS: Primary | ICD-10-CM

## 2025-05-12 PROCEDURE — 97140 MANUAL THERAPY 1/> REGIONS: CPT | Performed by: PHYSICAL THERAPIST

## 2025-05-12 PROCEDURE — 97110 THERAPEUTIC EXERCISES: CPT | Performed by: PHYSICAL THERAPIST

## 2025-05-12 NOTE — PROGRESS NOTES
Daily Note     Today's date: 2025  Patient name: Jaye Jasso  : 1950  MRN: [id number]  Referring provider: Richard Aguilar*  Dx:   Encounter Diagnosis     ICD-10-CM    1. Trochanteric bursitis of both hips  M70.61     M70.62       2. Lumbar radiculopathy  M54.16       3. Chronic midline thoracic back pain  M54.6     G89.29                  Precautions: Patient's PMHx is remarkable for HTN, Asthma and Osteoporosis.      Manuals 3/31 4/2 4/9 4/11 4/17 4/23  4/25 4/28 5/1 5/8   IASTM to bilateral lateral hip and ITB in side lying      And STM to thoracic spine 10 min 10 min  10 min  10 min 10 min 10 min  10 min  X 10 min  X 10 -GF 10 min  along with STM and pa mobilization to thoracic spine in prone                                          Neuro Re-Ed                                                    Ther Ex                          Nu step  10 min  10 min  10 min 10 min 10 min  10 min  X 10 min  X 10 min L 1 10 MIN    Standing lumbar spine extension against counter 10 x 2 hep 20x  30x 3 x 10 3 x 10 30x 20x  X 20  2 x 10  20X    Standing ITB stretch:B:  20sec 5x  20sec 5x 20 sec x 5 20 sec x 5 20sec 5x  20 sec5x  20 sec x 5 20 sec x 5  20SEC 5X    Standing hip flexor stretch:B:  20SEC 5X NT NT NT NT 20SEC 5X  20 sec x 5  20 sec x 5  20SEC 5X                 LAQ:B:  NT 20X  2 x 10 2# x 20 20X 3#  NT   NT   Hip flexion seated:B:  NT NT 2 x 10 2# x 20 20X 3#  NT   NT   Seated thoracic ext with half roll   NT NT 2 x 10  NT NT   NT   Hamstring stretch:B   20SEC 5X  20 sec x 5 20 sec x 5 20SEC 5X  20SEC 5X  20 sec x 5  20 sec x 5  20SEC 5X    LTR:B:  20x 2sec  20X 3SEC  10 sec x 5 10 sec x 5 20X 3SEC  20X 3SEC  3 sec x 20  3 sec x 20  20SEC 5X    Piriformis stretch:B:  20sec 3x  20SEC 5X  20 sec x 5 20 sec x 5 20SEC 5X  20SEC 5X  20 sec x 5  20 sec x 5  20SEC 5X    DLS abdominal bracing in hook lying  20X 20X  NT NT NT 20X 3SEC  20 x 3 sec  20  x 3 sec  20SEC 5X    DLS abdominal bracing in hook  lying with hip flexion:B:  20X  20X  NT NT 20X 3#  20X 3#  x 20  3 x 10  20x 3#    DLS abdominal bracing in hook lying with slr flexion:B:  20X  20X  NT NT 20X 3#  20X 3SEC  20 x 3 sec  20 x 3 sec  20x 3#    bridges  15x  20X  NT NT 20X 3SEC  20x 2sec  20 x 2 sec x  20 x 2 sec  20x 3sec    Side lying hip abduction:B:  NT NT NT NT 20X 3#  NT   NT   Prone lying  NT NT 2 min 2 min NT NT   NT   Prone press ups   NT 2 x 10 NT 20X 20X  X 20  2 x 10  20X    Prone on elbows   2 min  NT NT NT 2 MIN  3 MIN  X 3 min  X 3 min  3 MIN                 Mini squats  NT 20X 2 x 10 2 x 10  20X 20X X 20  2 x 10  20X    Lunges on foam airex  NT NT 2 x 10 2 x 10 20X 20X  X 20  2 x 10  20X    Tband rows and extension:B  NT NT NT NT NT NT   NT   Tband Paloff Press  NT NT NT NT NT NT   NT   SLRX 3   20X  2 x 10 NT 20X 3#  20X 3#  3# x 20  3# 2 x 10  20X 3#                                           Ther Activity                                       Gait Training                                       Modalities                          MHP to bilateral lateral hips in hook lying or seatd  Prone to thoracic/ lumbar and B hips   Prone x 10 min  10 MIN   PRONE  10 MIN  X 10 min Prone  X 10 min prone  10 MIN     LB  /thoracic/ B ut                        PT Evaluation     Today's date: 2025  Patient name: Jaye Jasso  : 1950  MRN: [id number]  Referring provider: Richard Aguilar*  Dx:   Encounter Diagnosis     ICD-10-CM    1. Trochanteric bursitis of both hips  M70.61     M70.62       2. Lumbar radiculopathy  M54.16       3. Chronic midline thoracic back pain  M54.6     G89.29                      Assessment  Impairments: abnormal gait, abnormal or restricted ROM, abnormal movement, activity intolerance, impaired physical strength, lacks appropriate home exercise program and pain with function    Assessment details: PT Reassessment: 25.  Patient reported the following progress since onset of PT: decrease in  "thoracic spine pain, no longer having radiating pain from the thoracic spine into the ribs, bilateral hip pain, standing and walking activities.  But, she noted the following deficits that still persists: thoracic spine pain and muscle spasms, bending based functional activities.  Patient noted she changed her left shoe orthotic, which aggravated her left hip pain.  Patient noted right side lying will create right lateral paresthesias.    PT IE: 3/31/25.  Patient reported chronic bilateral hip, lumbar and thoracic spine pain was aggravated with self, house hold and work based activities.  Patient reported she also has to help her  around the house that aggravated her lumbar, thoracic and bilateral hip pain.  Patient noted she has thoracic spine pain that radiated into the anterior ribs.  Patient noted helping her  with his iadls and activities.  Patient noted she has return of bilateral hip bursitis.  Patient noted she had x-ray of lumbar spine and thoracic spine that showed arthritic changes.  Patient noted she saw her spine and pain specialist who ordered MRI on lumbar and thoracic spine.  Patient noted she gets lumbar and thoracic spine will get aggravated with bed transfers, prolonged sitting with pain from the lumbar spine to the thoracic spine / scapular region.  Patient noted sleep is deprived.  Patient noted she has bilateral lateral hip pain that radiates into the bilateral lateral knees.  Patient noted walking aggravates her bilateral lateral hip pain with intermittent episodes of left lower extremity buckle sensation without falling.  Patient noted stair climbing is going \"ok\" but she noted she limits the stairs as much as possible.  Patient noted bending activities is pain aggravating.  Patient noted bilateral lower extremities are weak.  Patient noted she has challenges with standing based self, spouse and house hold chores and activities.    Understanding of Dx/Px/POC: excellent   "   Prognosis: good  Prognosis details: Patient is a 74 y.o. year old female seen for outpatient PT re-evaluation with a Trochanteric bursitis of both hips [M70.61, M70.62], lumbar radiculopathy and midline thoracic back pain.  Patient presents with the following progress since onset of PT: decrease in thoracic and bilateral lateral hip pain, increase in lumbar spine mobility, increase in bilateral lower extremity mobility and strength, gait and transfer progress, decrease in TTP and self iadls and activities.  Patient presents to PT reassessment with the following problems, concerns, deficits and impairments: bilateral hip, lumbar and thoracic spine region pain, decreased lumbar spine range of motion, decreased bilateral lower extremity mobility and strength, gait and stair dysfunctions, + TTP, + muscle guarding, transfer dysfunctions, functional limitations and decreased tolerance to activity.  Patient would benefit from skilled PT services under the following PT treatment plan to address the above noted deficits: therapeutic exercises and activities to facilitate lumbar spine mobility and bilateral lower extremity mobility and strength, modalities, manual therapy techniques, postural reeducation and strengthening, DLS and abdominal strengthening, traction, gait and stair training, transfer training, balance and proprioception activities, and a hep.  Thank you for the referral.     Goals  Short Term goals 4 - 6 weeks  1.  Patient will be independent HEP. MET.  2.  Patient will report a 25 - 50% decrease in pain complaints.MET.  3.  Increase strength 1/2 grade.  MET.  4.  Increase ROM 5-10 degrees.  MET.    Long Term goals 8 - 12 weeks  1.  Patient will report elimination of pain complaints. Partially MET.  2.  Patient will return to all work related activities without restriction.Partially MET.  3.  Patient will return to all recreational activities without restriction.Partially MET.  4.  ROM WFL.Partially MET.  5.   Strength 5/5.Partially MET.  6.  Patient will report ability to resume independent self dressing activities without bilateral hip, lumbar or thoracic spine region pain aggravation or limitations.Partially MET.  7.  Patient will report ability to resume standing house hold chores and activities without bilateral hip, lumbar or thoracic spine region pain aggravation or limitations.Partially MET.  8.  Patient will report ability to resume all transfers without bilateral hip, lumbar or thoracic spine region pain aggravation or limitations.Partially MET.  9.  Patient will report ability to resume all ambulation based self functional activities without bilateral hip, lumbar or thoracic spine region pain aggravation or limitations.Partially MET.  10.  Patient will report ability to perform house hold stairs activities without bilateral hip, lumbar or thoracic spine region pain aggravation or limitations.Partially MET.  11.  Patient will report ability to resume chores and activities that involve bending, squatting and lifting without bilateral hip, lumbar or thoracic spine region pain aggravation or limitations.Partially MET.  12.  Patient will report ability to resume standing based functional activities without bilateral hip, lumbar or thoracic spine region pain aggravation or limitations.Partially MET.    Plan  Patient would benefit from: skilled physical therapy and PT eval  Planned modality interventions: low level laser therapy, manual electrical stimulation, TENS, thermotherapy: hydrocollator packs, ultrasound, unattended electrical stimulation, cryotherapy and electrical stimulation/Russian stimulation    Planned therapy interventions: IASTM, joint mobilization, kinesiology taping, manual therapy, massage, balance, balance/weight bearing training, neuromuscular re-education, postural training, body mechanics training, self care, compression, strengthening, stretching, therapeutic activities, therapeutic exercise,  therapeutic training, transfer training, flexibility, functional ROM exercises, gait training, graded activity, graded exercise, graded motor, home exercise program, IADL retraining and aquatic therapy    Frequency: 1-2x week  Duration in weeks: 12  Treatment plan discussed with: patient      Subjective Evaluation    History of Present Illness  Mechanism of injury: Patient's PMHx is remarkable for HTN, Asthma and Osteoporosis.      LUMBAR SPINE     INDICATION:   Radiculopathy, lumbar region.      COMPARISON:  None.     VIEWS:  XR SPINE LUMBAR MINIMUM 4 VIEWS NON INJURY  Images: 5     FINDINGS:     There are 5 non rib bearing lumbar vertebral bodies.      There is no evidence of acute fracture or destructive osseous lesion.     Trace anterolisthesis L4-5.     L4-5 and L5-S1 disc height loss with advanced lower lumbar facet arthropathy with multilevel vertebral body endplate spurring.     The pedicles appear intact.     There are atherosclerotic calcifications. Soft tissues are otherwise unremarkable.     IMPRESSION:        No acute osseous abnormality.       Degenerative changes as described.       THORACIC SPINE     INDICATION:   Pain in thoracic spine. Other chronic pain.      COMPARISON:  None.     VIEWS:  XR SPINE THORACIC 3 VW  Images: 3     FINDINGS:     There is no fracture or pathologic bone lesion.     Thoracic vertebral alignment is within normal limits.     Age related degenerative changes are seen.      There is no displacement of the paraspinal line.      The pedicles appear intact.     IMPRESSION:        No acute osseous abnormality.  Degenerative changes as described.      LEFT HIP     INDICATION:   Pain in left hip.      COMPARISON:  None.     VIEWS:  XR HIP/PELV 2-3 VWS LEFT  W PELVIS IF PERFORMED   Images: 3     FINDINGS:     There is no acute fracture or dislocation.     Mild left hip osteoarthritis is seen.     No lytic or blastic osseous lesion.     Soft tissues are unremarkable.     Degenerative  changes pubic symphysis and visualized lower lumbar spine.     IMPRESSION:        No acute osseous abnormality.     Degenerative changes as described.      Patient Goals  Patient goals for therapy: decreased pain, increased motion, independence with ADLs/IADLs, return to sport/leisure activities, increased strength and improved balance  Patient goal: Patient's goals of to losen up her tight musculature, to get stronger and resume all activities without bilateral lateral hip, lumbar and thoracic spine pain  Pain  At best pain ratin  At worst pain ratin  Location: lumbar, thoracic spine and bilateral hips        Objective     Tenderness     Additional Tenderness Details  Patient is + TTP at bilateral ITB at moderate to minimal to moderate levels.  Patient is + for moderate to severe muscle guarding at bilateral lumbar and thoracic spine erector spinae musculature.    Active Range of Motion   Cervical/Thoracic Spine       Thoracic    Flexion: 92 degrees   Extension: 32 degrees      Left lateral flexion: 24 degrees     Right lateral flexion: 25 degrees  with pain  Left rotation: 56 degrees   Right rotation: 52 degrees   Left Hip   Flexion: 106 degrees   Abduction: 26 degrees     Right Hip   Flexion: 110 degrees   Abduction: 32 degrees     Additional Active Range of Motion Details  Hamstring mobility on right at 64 and at 68 degrees on left.    Strength/Myotome Testing     Left Hip   Planes of Motion   Flexion: 4  Extension: 4+  Abduction: 4+  Adduction: 5    Right Hip   Planes of Motion   Flexion: 4  Extension: 4+  Abduction: 4+  Adduction: 5    Left Knee   Flexion: 4+  Extension: 4-    Right Knee   Flexion: 4+  Extension: 4-    Left Ankle/Foot   Dorsiflexion: 5  Plantar flexion: 5    Right Ankle/Foot   Dorsiflexion: 5  Plantar flexion: 5    Tests     Lumbar     Left   Positive slump test.   Negative passive SLR.     Right   Negative passive SLR and slump test.     Left Pelvic Girdle/Sacrum   Negative: active  SLR test.     Right Pelvic Girdle/Sacrum   Negative: active SLR test.     Left Hip   Positive ELODIA and piriformis.     Right Hip   Positive ELODIA.   Negative piriformis.     Ambulation     Ambulation: Level Surfaces   Ambulation with assistive device: independent    Additional Level Surfaces Ambulation Details  Patient ambulates with decrease in lumbar spine rotation..    Ambulation: Stairs   Ascend stairs: independent  Pattern: reciprocal  Railings: two rails  Descend stairs: independent  Pattern: non-reciprocal  Railings: two rails                 Precautions: Patient's PMHx is remarkable for HTN, Asthma and Osteoporosis.      Manuals 4/9 4/11 4/17 4/23 4/25 4/28 5/1 5/8 5/12   IASTM to bilateral lateral hip and ITB in side lying      And STM to thoracic spine 10 min  10 min 10 min 10 min  10 min  X 10 min  X 10 -GF 10 min                                         Neuro Re-Ed                                                Ther Ex                        Nu step 10 min  10 min 10 min 10 min  10 min  X 10 min  X 10 min L 1 10 MIN  10 min   Standing lumbar spine extension against counter 30x 3 x 10 3 x 10 30x 20x  X 20  2 x 10  20X  2 x 10   Standing ITB stretch:B: 20sec 5x 20 sec x 5 20 sec x 5 20sec 5x  20 sec5x  20 sec x 5 20 sec x 5  20SEC 5X  NT   Standing hip flexor stretch:B: NT NT NT NT 20SEC 5X  20 sec x 5  20 sec x 5  20SEC 5X  NT               LAQ:B: 20X  2 x 10 2# x 20 20X 3#  NT   NT NT   Hip flexion seated:B: NT 2 x 10 2# x 20 20X 3#  NT   NT NT   Seated thoracic ext with half roll  NT 2 x 10  NT NT   NT NT   Hamstring stretch:B 20SEC 5X  20 sec x 5 20 sec x 5 20SEC 5X  20SEC 5X  20 sec x 5  20 sec x 5  20SEC 5X  20 sec x 5   LTR:B: 20X 3SEC  10 sec x 5 10 sec x 5 20X 3SEC  20X 3SEC  3 sec x 20  3 sec x 20  20SEC 5X  10 sec x 5   Piriformis stretch:B: 20SEC 5X  20 sec x 5 20 sec x 5 20SEC 5X  20SEC 5X  20 sec x 5  20 sec x 5  20SEC 5X  20 sec x 5   DLS abdominal bracing in hook lying 20X  NT NT NT 20X  3SEC  20 x 3 sec  20  x 3 sec  20SEC 5X  NT   DLS abdominal bracing in hook lying with hip flexion:B: 20X  NT NT 20X 3#  20X 3#  x 20  3 x 10  20x 3#  3# x 20    DLS abdominal bracing in hook lying with slr flexion:B: 20X  NT NT 20X 3#  20X 3SEC  20 x 3 sec  20 x 3 sec  20x 3#  3# x 20   bridges 20X  NT NT 20X 3SEC  20x 2sec  20 x 2 sec x  20 x 2 sec  20x 3sec  3 sec x 20   Side lying hip abduction:B: NT NT NT 20X 3#  NT   NT NT   Prone lying NT 2 min 2 min NT NT   NT NT   Prone press ups NT 2 x 10 NT 20X 20X  X 20  2 x 10  20X  NT   Prone on elbows  NT NT NT 2 MIN  3 MIN  X 3 min  X 3 min  3 MIN  NT               Mini squats 20X 2 x 10 2 x 10  20X 20X X 20  2 x 10  20X  2 x 10   Lunges on foam airex NT 2 x 10 2 x 10 20X 20X  X 20  2 x 10  20X  2 x 10   Tband rows and extension:B NT NT NT NT NT   NT NT   Tband Paloff Press NT NT NT NT NT   NT NT   SLRX 3 20X  2 x 10 NT 20X 3#  20X 3#  3# x 20  3# 2 x 10  20X 3#  NT                                       Ther Activity                                    Gait Training                                    Modalities                        MHP to bilateral lateral hips in hook lying or seatd  Prone x 10 min  10 MIN   PRONE  10 MIN  X 10 min Prone  X 10 min prone  10 MIN     LB  /thoracic/ B ut  10 min

## 2025-05-15 ENCOUNTER — OFFICE VISIT (OUTPATIENT)
Dept: PHYSICAL THERAPY | Age: 75
End: 2025-05-15
Attending: PAIN MEDICINE
Payer: MEDICARE

## 2025-05-15 DIAGNOSIS — M70.62 TROCHANTERIC BURSITIS OF BOTH HIPS: Primary | ICD-10-CM

## 2025-05-15 DIAGNOSIS — M70.61 TROCHANTERIC BURSITIS OF BOTH HIPS: Primary | ICD-10-CM

## 2025-05-15 PROCEDURE — 97110 THERAPEUTIC EXERCISES: CPT

## 2025-05-15 PROCEDURE — 97140 MANUAL THERAPY 1/> REGIONS: CPT

## 2025-05-15 NOTE — PROGRESS NOTES
Daily Note     Today's date: 5/15/2025  Patient name: Jaye Jasso  : 1950  MRN: [id number]  Referring provider: Richard Aguilar*  Dx:   Encounter Diagnosis     ICD-10-CM    1. Trochanteric bursitis of both hips  M70.61     M70.62                      Subjective: Pt reported that upper back starts to hurt more when I am sitting for long periods of time.       Objective: See treatment diary below      Assessment: Improved thoracic and LB mobility and decreased pain with exercises, manual work and modalities.       Plan: Cont with plan of care      Precautions: Patient's PMHx is remarkable for HTN, Asthma and Osteoporosis.      Manuals  5/15   4/11 4/17 4/23  4/25 4/28 5/1 5/8   IASTM to bilateral lateral hip and ITB in side lying      And STM to thoracic spine 10 min    10 min 10 min 10 min  10 min  X 10 min  X 10 -GF 10 min  along with STM and pa mobilization to thoracic spine in prone                                          Neuro Re-Ed                                                    Ther Ex                          Nu step  10 min    10 min 10 min 10 min  10 min  X 10 min  X 10 min L 1 10 MIN    Standing lumbar spine extension against counter 30x    3 x 10 3 x 10 30x 20x  X 20  2 x 10  20X    Standing ITB stretch:B: 20sec 5x    20 sec x 5 20 sec x 5 20sec 5x  20 sec5x  20 sec x 5 20 sec x 5  20SEC 5X    Standing hip flexor stretch:B:  20sec 5x    NT NT NT 20SEC 5X  20 sec x 5  20 sec x 5  20SEC 5X                 LAQ:B: 20x    2 x 10 2# x 20 20X 3#  NT   NT   Hip flexion seated:B: 20x    2 x 10 2# x 20 20X 3#  NT   NT   Seated thoracic ext with half roll  20x 2x    2 x 10  NT NT   NT   Hamstring stretch:B  20sec 5x    20 sec x 5 20 sec x 5 20SEC 5X  20SEC 5X  20 sec x 5  20 sec x 5  20SEC 5X    LTR:B: 20x 2sec    10 sec x 5 10 sec x 5 20X 3SEC  20X 3SEC  3 sec x 20  3 sec x 20  20SEC 5X    Piriformis stretch:B: 20sec 5x    20 sec x 5 20 sec x 5 20SEC 5X  20SEC 5X  20 sec x 5  20 sec x 5   20SEC 5X    DLS abdominal bracing in hook lying  20sec 5x    NT NT NT 20X 3SEC  20 x 3 sec  20  x 3 sec  20SEC 5X    DLS abdominal bracing in hook lying with hip flexion:B: 20x    NT NT 20X 3#  20X 3#  x 20  3 x 10  20x 3#    DLS abdominal bracing in hook lying with slr flexion:B:  20x    NT NT 20X 3#  20X 3SEC  20 x 3 sec  20 x 3 sec  20x 3#    bridges 20x    NT NT 20X 3SEC  20x 2sec  20 x 2 sec x  20 x 2 sec  20x 3sec    Side lying hip abduction:B: NT    NT NT 20X 3#  NT   NT   Prone lying NT   2 min 2 min NT NT   NT   Prone press ups 20X    2 x 10 NT 20X 20X  X 20  2 x 10  20X    Prone on elbows   3 MIN    NT NT 2 MIN  3 MIN  X 3 min  X 3 min  3 MIN                 Mini squats 20X   2 x 10 2 x 10  20X 20X X 20  2 x 10  20X    Lunges on foam airex 20X    2 x 10 2 x 10 20X 20X  X 20  2 x 10  20X    Tband rows and extension:B NT   NT NT NT NT   NT   Tband Paloff Press NT    NT NT NT NT   NT   SLRX 3 20X    2 x 10 NT 20X 3#  20X 3#  3# x 20  3# 2 x 10  20X 3#                                           Ther Activity                                       Gait Training                                       Modalities                          MHP to bilateral lateral hips in hook lying or seatd 10 MIN     Prone x 10 min  10 MIN   PRONE  10 MIN  X 10 min Prone  X 10 min prone  10 MIN     LB  /thoracic/ B ut

## 2025-05-19 ENCOUNTER — APPOINTMENT (OUTPATIENT)
Dept: PHYSICAL THERAPY | Age: 75
End: 2025-05-19
Payer: MEDICARE

## 2025-05-21 ENCOUNTER — OFFICE VISIT (OUTPATIENT)
Dept: PHYSICAL THERAPY | Age: 75
End: 2025-05-21
Attending: PAIN MEDICINE
Payer: MEDICARE

## 2025-05-21 DIAGNOSIS — M54.16 LUMBAR RADICULOPATHY: ICD-10-CM

## 2025-05-21 DIAGNOSIS — M70.61 TROCHANTERIC BURSITIS OF BOTH HIPS: Primary | ICD-10-CM

## 2025-05-21 DIAGNOSIS — M70.62 TROCHANTERIC BURSITIS OF BOTH HIPS: Primary | ICD-10-CM

## 2025-05-21 DIAGNOSIS — G89.29 CHRONIC MIDLINE THORACIC BACK PAIN: ICD-10-CM

## 2025-05-21 DIAGNOSIS — M54.6 CHRONIC MIDLINE THORACIC BACK PAIN: ICD-10-CM

## 2025-05-21 PROCEDURE — 97110 THERAPEUTIC EXERCISES: CPT | Performed by: PHYSICAL THERAPIST

## 2025-05-21 PROCEDURE — 97140 MANUAL THERAPY 1/> REGIONS: CPT | Performed by: PHYSICAL THERAPIST

## 2025-05-21 NOTE — PROGRESS NOTES
PT Evaluation  / PT Reassessment     Today's date: 2025  Patient name: Jaye Jasso  : 1950  MRN: [id number]  Referring provider: Richard Aguilar*  Dx:   Encounter Diagnosis     ICD-10-CM    1. Trochanteric bursitis of both hips  M70.61     M70.62       2. Lumbar radiculopathy  M54.16       3. Chronic midline thoracic back pain  M54.6     G89.29                      Assessment  Impairments: abnormal gait, abnormal or restricted ROM, abnormal movement, activity intolerance, impaired physical strength, lacks appropriate home exercise program and pain with function    Assessment details: PT Reassessment: 25.  Patient reported the following progress since onset of PT:decrease in pain, increase in lumbar spine mobility, increase in bilateral lower extremity mobility and strength and standing based functional activities.  But, she noted the following deficits that still persists:unable to perform side lying in bed, prolonged sitting functional activities like typing, bending, lifting activities, assisting her  with his functional activities.     PT IE: 3/31/25.  Patient reported chronic bilateral hip, lumbar and thoracic spine pain was aggravated with self, house hold and work based activities.  Patient reported she also has to help her  around the house that aggravated her lumbar, thoracic and bilateral hip pain.  Patient noted she has thoracic spine pain that radiated into the anterior ribs.  Patient noted helping her  with his iadls and activities.  Patient noted she has return of bilateral hip bursitis.  Patient noted she had x-ray of lumbar spine and thoracic spine that showed arthritic changes.  Patient noted she saw her spine and pain specialist who ordered MRI on lumbar and thoracic spine.  Patient noted she gets lumbar and thoracic spine will get aggravated with bed transfers, prolonged sitting with pain from the lumbar spine to the thoracic spine / scapular  "region.  Patient noted sleep is deprived.  Patient noted she has bilateral lateral hip pain that radiates into the bilateral lateral knees.  Patient noted walking aggravates her bilateral lateral hip pain with intermittent episodes of left lower extremity buckle sensation without falling.  Patient noted stair climbing is going \"ok\" but she noted she limits the stairs as much as possible.  Patient noted bending activities is pain aggravating.  Patient noted bilateral lower extremities are weak.  Patient noted she has challenges with standing based self, spouse and house hold chores and activities.    Understanding of Dx/Px/POC: excellent     Prognosis: good  Prognosis details: Patient is a 74 y.o. year old female seen for outpatient PT reevaluation with a Trochanteric bursitis of both hips [M70.61, M70.62], lumbar radiculopathy and midline thoracic back pain.  Patient presents with the following progress since onset of PT: decrease in lumbar, thoracic and bilateral hip pain, increase in lumbar spine mobility, increase in bilateral lower extremity mobility and strength, decrease in TTP, gait improvements and self functional progress.  Patient presents to PT reassessment with the following problems, concerns, deficits and impairments: bilateral hip, lumbar and thoracic spine region pain, decreased lumbar spine range of motion, decreased bilateral lower extremity mobility and strength, gait and stair dysfunctions, + TTP, + muscle guarding, transfer dysfunctions, functional limitations and decreased tolerance to activity.  Patient would benefit from skilled PT services under the following PT treatment plan to address the above noted deficits: therapeutic exercises and activities to facilitate lumbar spine mobility and bilateral lower extremity mobility and strength, modalities, manual therapy techniques, postural reeducation and strengthening, DLS and abdominal strengthening, traction, gait and stair training, transfer " training, balance and proprioception activities, and a hep.  Thank you for the referral.     Goals  Short Term goals 4 - 6 weeks  1.  Patient will be independent HEP. MET.  2.  Patient will report a 25 - 50% decrease in pain complaints.  MET.  3.  Increase strength 1/2 grade.  MET.  4.  Increase ROM 5-10 degrees.  MET.    Long Term goals 8 - 12 weeks  1.  Patient will report elimination of pain complaints.  Partially MET.  2.  Patient will return to all work related activities without restriction.Partially MET.  3.  Patient will return to all recreational activities without restriction.Partially MET.  4.  ROM WFL.Partially MET.  5.  Strength 5/5.Partially MET.  6.  Patient will report ability to resume independent self dressing activities without bilateral hip, lumbar or thoracic spine region pain aggravation or limitations.Partially MET.  7.  Patient will report ability to resume standing house hold chores and activities without bilateral hip, lumbar or thoracic spine region pain aggravation or limitations.Partially MET.  8.  Patient will report ability to resume all transfers without bilateral hip, lumbar or thoracic spine region pain aggravation or limitations.Partially MET.  9.  Patient will report ability to resume all ambulation based self functional activities without bilateral hip, lumbar or thoracic spine region pain aggravation or limitations.Partially MET.  10.  Patient will report ability to perform house hold stairs activities without bilateral hip, lumbar or thoracic spine region pain aggravation or limitations.Partially MET.  11.  Patient will report ability to resume chores and activities that involve bending, squatting and lifting without bilateral hip, lumbar or thoracic spine region pain aggravation or limitations.Partially MET.  12.  Patient will report ability to resume standing based functional activities without bilateral hip, lumbar or thoracic spine region pain aggravation or  limitations.Partially MET.    Plan  Patient would benefit from: skilled physical therapy and PT eval  Planned modality interventions: low level laser therapy, manual electrical stimulation, TENS, thermotherapy: hydrocollator packs, ultrasound, unattended electrical stimulation, cryotherapy and electrical stimulation/Russian stimulation    Planned therapy interventions: IASTM, joint mobilization, kinesiology taping, manual therapy, massage, balance, balance/weight bearing training, neuromuscular re-education, postural training, body mechanics training, self care, compression, strengthening, stretching, therapeutic activities, therapeutic exercise, therapeutic training, transfer training, flexibility, functional ROM exercises, gait training, graded activity, graded exercise, graded motor, home exercise program, IADL retraining and aquatic therapy    Frequency: 1-2x week  Duration in weeks: 12  Treatment plan discussed with: patient      Subjective Evaluation    History of Present Illness  Mechanism of injury: Patient's PMHx is remarkable for HTN, Asthma and Osteoporosis.      LUMBAR SPINE     INDICATION:   Radiculopathy, lumbar region.      COMPARISON:  None.     VIEWS:  XR SPINE LUMBAR MINIMUM 4 VIEWS NON INJURY  Images: 5     FINDINGS:     There are 5 non rib bearing lumbar vertebral bodies.      There is no evidence of acute fracture or destructive osseous lesion.     Trace anterolisthesis L4-5.     L4-5 and L5-S1 disc height loss with advanced lower lumbar facet arthropathy with multilevel vertebral body endplate spurring.     The pedicles appear intact.     There are atherosclerotic calcifications. Soft tissues are otherwise unremarkable.     IMPRESSION:        No acute osseous abnormality.       Degenerative changes as described.       THORACIC SPINE     INDICATION:   Pain in thoracic spine. Other chronic pain.      COMPARISON:  None.     VIEWS:  XR SPINE THORACIC 3 VW  Images: 3     FINDINGS:     There is no  fracture or pathologic bone lesion.     Thoracic vertebral alignment is within normal limits.     Age related degenerative changes are seen.      There is no displacement of the paraspinal line.      The pedicles appear intact.     IMPRESSION:        No acute osseous abnormality.  Degenerative changes as described.      LEFT HIP     INDICATION:   Pain in left hip.      COMPARISON:  None.     VIEWS:  XR HIP/PELV 2-3 VWS LEFT  W PELVIS IF PERFORMED   Images: 3     FINDINGS:     There is no acute fracture or dislocation.     Mild left hip osteoarthritis is seen.     No lytic or blastic osseous lesion.     Soft tissues are unremarkable.     Degenerative changes pubic symphysis and visualized lower lumbar spine.     IMPRESSION:        No acute osseous abnormality.     Degenerative changes as described.      Patient Goals  Patient goals for therapy: decreased pain, increased motion, independence with ADLs/IADLs, return to sport/leisure activities, increased strength and improved balance  Patient goal: Patient's goals of to losen up her tight musculature, to get stronger and resume all activities without bilateral lateral hip, lumbar and thoracic spine pain  Pain  At best pain ratin  At worst pain ratin  Location: lumbar, thoracic spine and bilateral hips      Objective     Tenderness     Additional Tenderness Details  Patient is + TTP at bilateral ITB at moderate  levels.  Patient is + for moderate to severe muscle guarding at bilateral lumbar spine erector spinae musculature.    Active Range of Motion   Cervical/Thoracic Spine       Thoracic    Flexion: 92 degrees  with pain  Extension: 34 degrees      Left lateral flexion: 16 degrees     Right lateral flexion: 24 degrees   Left rotation: 56 degrees   Right rotation: 60 degrees   Left Hip   Flexion: 116 degrees   Abduction: 28 degrees     Right Hip   Flexion: 120 degrees   Abduction: 32 degrees     Additional Active Range of Motion Details  Hamstring mobility on  right at 70 and at 74 degrees on left.    Strength/Myotome Testing     Left Hip   Planes of Motion   Flexion: 4  Extension: 4  Abduction: 4+  Adduction: 5    Right Hip   Planes of Motion   Flexion: 4  Extension: 4  Abduction: 4+  Adduction: 5    Left Knee   Flexion: 4  Extension: 4    Right Knee   Flexion: 4  Extension: 4    Left Ankle/Foot   Dorsiflexion: 4+  Plantar flexion: 5    Right Ankle/Foot   Dorsiflexion: 4+  Plantar flexion: 5    Tests     Lumbar     Left   Positive slump test.   Negative passive SLR.     Right   Negative passive SLR and slump test.     Left Pelvic Girdle/Sacrum   Negative: active SLR test.     Right Pelvic Girdle/Sacrum   Negative: active SLR test.     Left Hip   Positive ELODIA and piriformis.     Right Hip   Positive ELODIA.   Negative piriformis.     Ambulation     Ambulation: Level Surfaces   Ambulation without assistive device: independent    Additional Level Surfaces Ambulation Details  .    Ambulation: Stairs   Ascend stairs: independent  Pattern: reciprocal  Railings: two rails  Descend stairs: independent  Pattern: non-reciprocal  Railings: two rails                         Precautions: Patient's PMHx is remarkable for HTN, Asthma and Osteoporosis.      Manuals  5/15 5/21   4/23  4/25 4/28 5/1 5/8   IASTM to bilateral lateral hip and ITB in side lying      And STM to thoracic spine 10 min  10 min   10 min  10 min  X 10 min  X 10 -GF 10 min  along with STM and pa mobilization to thoracic spine in prone                                       Neuro Re-Ed                                                Ther Ex                        Nu step  10 min  10 min   10 min  10 min  X 10 min  X 10 min L 1 10 MIN    Standing lumbar spine extension against counter 30x  3 x 10   30x 20x  X 20  2 x 10  20X    Standing ITB stretch:B: 20sec 5x  20 sec x 5   20sec 5x  20 sec5x  20 sec x 5 20 sec x 5  20SEC 5X    Standing hip flexor stretch:B:  20sec 5x  20 sec x 5   NT 20SEC 5X  20 sec x 5  20 sec x 5   20SEC 5X                LAQ:B: 20x  NT   20X 3#  NT   NT   Hip flexion seated:B: 20x  NT   20X 3#  NT   NT   Seated thoracic ext with half roll  20x 2x  NT   NT NT   NT   Hamstring stretch:B  20sec 5x  20 sec x 5   20SEC 5X  20SEC 5X  20 sec x 5  20 sec x 5  20SEC 5X    LTR:B: 20x 2sec  10 sec x 5   20X 3SEC  20X 3SEC  3 sec x 20  3 sec x 20  20SEC 5X    Piriformis stretch:B: 20sec 5x  20 sec x 5   20SEC 5X  20SEC 5X  20 sec x 5  20 sec x 5  20SEC 5X    DLS abdominal bracing in hook lying  20sec 5x  NT   NT 20X 3SEC  20 x 3 sec  20  x 3 sec  20SEC 5X    DLS abdominal bracing in hook lying with hip flexion:B: 20x  2 x 10   20X 3#  20X 3#  x 20  3 x 10  20x 3#    DLS abdominal bracing in hook lying with slr flexion:B:  20x  NT   20X 3#  20X 3SEC  20 x 3 sec  20 x 3 sec  20x 3#    bridges 20x  2 x 10   20X 3SEC  20x 2sec  20 x 2 sec x  20 x 2 sec  20x 3sec    Side lying hip abduction:B: NT  NT   20X 3#  NT   NT   Prone lying NT 2 min   NT NT   NT   Prone press ups 20X  2 x 10   20X 20X  X 20  2 x 10  20X    Prone on elbows   3 MIN  NT   2 MIN  3 MIN  X 3 min  X 3 min  3 MIN                Mini squats 20X NT   20X 20X X 20  2 x 10  20X    Lunges on foam airex 20X  NT   20X 20X  X 20  2 x 10  20X    Tband rows and extension:B NT NT   NT NT   NT   Tband Paloff Press NT  NT   NT NT   NT   SLRX 3 20X  NT   20X 3#  20X 3#  3# x 20  3# 2 x 10  20X 3#                                        Ther Activity                                    Gait Training                                    Modalities                        MHP to bilateral lateral hips in hook lying or seatd 10 MIN   NT   10 MIN   PRONE  10 MIN  X 10 min Prone  X 10 min prone  10 MIN     LB  /thoracic/ B ut

## 2025-05-22 ENCOUNTER — APPOINTMENT (OUTPATIENT)
Dept: PHYSICAL THERAPY | Age: 75
End: 2025-05-22
Payer: MEDICARE

## 2025-05-22 ENCOUNTER — OFFICE VISIT (OUTPATIENT)
Age: 75
End: 2025-05-22

## 2025-05-22 VITALS — BODY MASS INDEX: 28.48 KG/M2 | WEIGHT: 165.9 LBS | TEMPERATURE: 97.6 F

## 2025-05-22 DIAGNOSIS — L82.1 SEBORRHEIC KERATOSIS: ICD-10-CM

## 2025-05-22 DIAGNOSIS — L21.9 SEBORRHEIC DERMATITIS: ICD-10-CM

## 2025-05-22 DIAGNOSIS — L65.0 TELOGEN EFFLUVIUM: Primary | ICD-10-CM

## 2025-05-22 RX ORDER — KETOCONAZOLE 20 MG/ML
SHAMPOO, SUSPENSION TOPICAL
Qty: 120 ML | Refills: 2 | Status: SHIPPED | OUTPATIENT
Start: 2025-05-22

## 2025-05-22 NOTE — PROGRESS NOTES
"[facility] Note     Patient Name: Jaye Jasso  Encounter Date: 05/22/2025       Have you been cared for by a [facility] Dermatologist in the last 3 years and, if so, which description applies to you? Yes. I have been here within the last 3 years, and my medical history has NOT changed since that time. I am not of child-bearing potential.     REVIEW OF SYSTEMS:  Have you recently had or currently have any of the following? No changes in my recent health.   PAST MEDICAL HISTORY:  Have you personally ever had or currently have any of the following?  If \"YES,\" then please provide more detail. No changes in my medical history.   HISTORY OF IMMUNOSUPPRESSION: Do you have a history of any of the following:  Systemic Immunosuppression such as Diabetes, Biologic or Immunotherapy, Chemotherapy, Organ Transplantation, Bone Marrow Transplantation or Prednisone?  No     Answering \"YES\" requires the addition of the dotphrase \"IMMUNOSUPPRESSED\" as the first diagnosis of the patient's visit.   FAMILY HISTORY:  Any \"first degree relatives\" (parent, brother, sister, or child) with the following?    No changes in my family's known health.   PATIENT EXPERIENCE:    Do you want the Dermatologist to perform a COMPLETE skin exam today including a clinical examination under the \"bra and underwear\" areas?  NO  If necessary, do we have your permission to call and leave a detailed message on your Preferred Phone number that includes your specific medical information?  Yes      Allergies[1] Current Medications[2]              Whom besides the patient is providing clinical information about today's encounter?   NO ADDITIONAL HISTORIAN (patient alone provided history)    Physical Exam and Assessment/Plan by Diagnosis:    TELOGEN EFFLUVIUM    History of Present Condition:      Duration: 7-8 weeks ago  Shedding/Decreased density?: Shedding, decreased density  Previous treatments: No  Current treatments: Castor oil, " rosemary oil  Shampoo frequency: 2-3x a week  Styling products: Mousse  Heat styling? Curling iron states she has stopped since hair loss  Hair color? Yes, patient goes to the hairdresser about once a week  Relaxers/Perms? No  Extensions/Weave? No  Premenopausal? No, post menopausal  Family planning/pregnancy? No              Method of birth control (if applicable)? N/A  Hx of breast cancer? No              History of racing heart rate/arrhythmia? Racing heart rate              History of low blood pressure? No  Thyroid disease? No  Anemia? No  Vitamin D Deficiency? No  Family history of hair loss? No  Dietary Restrictions? No  New medications ? No  Recent stressors: Sister was ill then  was ill  Additional details? States feel it is slowing down or has stopped and complains of itching    Physical Exam:    Well appearing, in no acute distress. Well nourishes, well developed. Alert and oriented. A focused skin exam was performed including scalp and hair. The exam was negative except as noted below:     Scalp:   Thinning diffusely, notably over the bitemporal scalp  Positive hair pull test  No perifollicular erythema or scale  Follicular orifices in tact  Hairline without recession   Eyebrows and Eyelashes In tact  Pertinent Positives:  Pertinent Negatives:      Assessment and Plan:  History and physical consistent with telogen effluvium  Educated patient that telogen effluvium is a common non-scarring form of hair loss that leads to diffuse shedding.  Educated patient that telogen effluvium classically occurs in the setting of significant physical/psychological stress, illness, medication/hormonal changes. While hair loss is often noted weeks to months after a trigger, the trigger is often never identified.  Denies recent surgeries, serious illnesses, childbirth, vaccinations, protein or caloric malnutrition, medication additions  severe emotional distress  No history of thyroid disease, vitamin D deficiency  or anemia.   Set expectations that hair loss often continues for 6-12 months with then cessation of shedding and subsequent regrowth. Further educated that a small subset of patients will have chronic telogen effluvium but that this is far less common and not expected.  Educated patient that as this condition is self-limited no interventions are indicated. However, should any abnormalities arise on lab work, appropriate therapies can be discussed. Of note, literature supports iron supplementation in patient with telogen effluvium with ferratin levels below 40 ng/mL to at least 70 ng/mL as this can lead to clinical improvement.   Recommend attempts to minimize any stressors    After discussion of risks/benefits of treatments, patient elects to proceed with the below plan:     -  No treatment  - Follow up as needed      PRURITUS OF SCALP     Physical Exam:  Anatomic Location Affected:  scalp  Morphological Description:  no rash present  Pertinent Positives:  Pertinent Negatives:    Additional History of Present Condition:  Reported by patient. Complains of itchy scalp.    Assessment and Plan:  Based on a thorough discussion of this condition and the management approach to it (including a comprehensive discussion of the known risks, side effects and potential benefits of treatment), the patient (family) agrees to implement the following specific plan:  Seborrheic dermatitis is related to a number of genetic and environmental factors. Comorbid conditions, including central nervous system disorders such as Parkinsons, can predispose individuals to developing the disease. The incidence in those with immunodeficiency is also higher than the general population.  Patient advised on good hygiene practices. Frequent cleansing with soap advised.  Ketoconazole 2% shampoo: Lather in scalp for 5 minutes and then rinse out 3 times a week for the next month and then taper to once weekly.      by its clinical appearance and behavior.  "Skin biopsy may be helpful but is rarely necessary to make this diagnosis.      SEBORRHEIC KERATOSIS; NON-INFLAMED    Physical Exam:  Anatomic Location Affected:  back  Morphological Description:  Flat and raised, waxy, smooth to warty textured, yellow to brownish-grey to dark brown to blackish, discrete, \"stuck-on\" appearing papule.  Pertinent Positives:  Pertinent Negatives:    Additional History of Present Condition:  Patient reports new bumps on the skin.  Denies burn, pain, bleeding or ulceration.  Complains of itching.  Present constantly; nothing seems to make it worse or better.  No prior treatment.      Assessment and Plan:  Based on a thorough discussion of this condition and the management approach to it (including a comprehensive discussion of the known risks, side effects and potential benefits of treatment), the patient (family) agrees to implement the following specific plan:  Educated that this is benign        Scribe Attestation      I,:  Jackie Jaeger MA am acting as a scribe while in the presence of the attending physician.:       I,:  James Real MD personally performed the services described in this documentation    as scribed in my presence.:                  [1]   Allergies  Allergen Reactions    Misc. Sulfonamide Containing Compounds Anaphylaxis    Sulfa Antibiotics Anaphylaxis and Other (See Comments)     rash    Zinc Rash    Other      Annotation - 26Jan2016: many inhalants, carries epi pen    Nabumetone Rash and Angioedema   [2]   Current Outpatient Medications:     Albuterol Sulfate 108 (90 Base) MCG/ACT AEPB, Inhale 2 puffs every 4 (four) hours, Disp: , Rfl:     aspirin 81 mg chewable tablet, , Disp: , Rfl:     beclomethasone (QVAR) 40 MCG/ACT inhaler, Inhale 2 puffs, Disp: , Rfl:     brinzolamide (AZOPT) 1 % ophthalmic suspension, , Disp: , Rfl:     Cholecalciferol (Vitamin D3) 50 MCG (2000 UT) capsule, , Disp: , Rfl:     fexofenadine (Allegra Allergy) 180 MG tablet, , " Disp: , Rfl:     losartan (COZAAR) 50 mg tablet, 50 mg (Patient taking differently: 50 mg in the morning and 50 mg before bedtime.), Disp: , Rfl:     LUMIGAN 0.01 % ophthalmic drops, Administer 0.001 drops to both eyes in the morning and 0.001 drops in the evening., Disp: , Rfl: 11    metoprolol succinate (TOPROL-XL) 25 mg 24 hr tablet, Take 1 tablet (25 mg total) by mouth daily (Patient taking differently: Take 12.5 mg by mouth in the morning.), Disp: 90 tablet, Rfl: 3    mometasone (NASONEX) 50 mcg/act nasal spray, , Disp: , Rfl:     multivitamin (THERAGRAN) TABS, Take 1 tablet by mouth in the morning., Disp: , Rfl:     Olopatadine HCl 0.6 % SOLN, , Disp: , Rfl:     rosuvastatin (CRESTOR) 10 MG tablet, Take 10 mg by mouth daily (Patient taking differently: Take 5 mg by mouth in the morning.), Disp: , Rfl:     venlafaxine (EFFEXOR-XR) 75 mg 24 hr capsule, TAKE 1 CAPSULE DAILY WITH 37.5 MG CAPSULE, TO EQUAL 112.5MG DAILY (Patient taking differently: 112.5 mg), Disp: , Rfl:     ofloxacin (OCUFLOX) 0.3 % ophthalmic solution, Administer 1 drop into the left eye 4 (four) times a day (Patient not taking: Reported on 5/22/2025), Disp: 5 mL, Rfl: 0

## 2025-05-22 NOTE — PATIENT INSTRUCTIONS
TELOGEN EFFLUVIUM    Physical Exam:    Well appearing, in no acute distress. Well nourishes, well developed. Alert and oriented. A focused skin exam was performed including scalp and hair. The exam was negative except as noted below:     Scalp:   Thinning diffusely, notably over the bitemporal scalp  Positive hair pull test  No perifollicular erythema or scale  Follicular orifices in tact  Hairline without recession   Eyebrows and Eyelashes In tact  Pertinent Positives:  Pertinent Negatives:      Assessment and Plan:  History and physical consistent with telogen effluvium  Educated patient that telogen effluvium is a common non-scarring form of hair loss that leads to diffuse shedding.  Educated patient that telogen effluvium classically occurs in the setting of significant physical/psychological stress, illness, medication/hormonal changes. While hair loss is often noted weeks to months after a trigger, the trigger is often never identified.  Denies recent surgeries, serious illnesses, childbirth, vaccinations, protein or caloric malnutrition, medication additions   severe emotional distress  No history of thyroid disease, vitamin D deficiency or anemia.   Set expectations that hair loss often continues for 6-12 months with then cessation of shedding and subsequent regrowth. Further educated that a small subset of patients will have chronic telogen effluvium but that this is far less common and not expected.  Educated patient that as this condition is self-limited no interventions are indicated. However, should any abnormalities arise on lab work, appropriate therapies can be discussed. Of note, literature supports iron supplementation in patient with telogen effluvium with ferratin levels below 40 ng/mL to at least 70 ng/mL as this can lead to clinical improvement.   Recommend attempts to minimize any stressors    After discussion of risks/benefits of treatments, patient elects to proceed with the below plan:  "    - No treatment  - Follow up as needed    2. SEBORRHEIC DERMATITIS    Physical Exam:  Anatomic Location Affected:  scalp    Assessment and Plan:  Based on a thorough discussion of this condition and the management approach to it (including a comprehensive discussion of the known risks, side effects and potential benefits of treatment), the patient (family) agrees to implement the following specific plan:  Seborrheic dermatitis is related to a number of genetic and environmental factors. Comorbid conditions, including central nervous system disorders such as Parkinsons, can predispose individuals to developing the disease. The incidence in those with immunodeficiency is also higher than the general population.  Patient advised on good hygiene practices. Frequent cleansing with soap advised.  Ketoconazole 2% shampoo: Apply to affected area of scalp daily for 5 to 10 minutes, then rinse clear.      Seborrheic Dermatitis   Seborrheic dermatitis is a common, chronic or relapsing form of eczema/dermatitis that mainly affects the sebaceous, gland-rich regions of the scalp, face, and trunk.  There are infantile and adult forms of seborrhoeic dermatitis. It is sometimes associated with psoriasis and, in that clinical scenario, may be referred to as \"sebo-psoriasis.\"  Seborrheic dermatitis is also known as \"seborrheic eczema.\"  Dandruff (also called \"pityriasis capitis\") is an uninflamed form of seborrhoeic dermatitis. Dandruff presents as bran-like scaly patches scattered within hair-bearing areas of the scalp.  In an infant, this condition may be referred to as \"cradle cap.\"  The cause of seborrheic dermatitis is not completely understood. It is associated with proliferation of various species of the skin commensal Malassezia, in its yeast (non-pathogenic) form. Its metabolites (such as the fatty acids oleic acid, malssezin, and indole-3-carbaldehyde) may cause an inflammatory reaction. Differences in skin barrier lipid " content and function may account for individual presentations.  ppears undisturbed by the rash, even when more generalized.    Adult Seborrheic Dermatitis  Adult seborrheic dermatitis tends to begin in late adolescence; prevalence is greatest in young adults and in the elderly. It is more common in males than in females.    The following factors are sometimes associated with severe adult seborrheic dermatitis:  Oily skin  Familial tendency to seborrhoeic dermatitis or a family history of psoriasis  Immunosuppression: organ transplant recipient, human immunodeficiency virus (HIV) infection and patients with lymphoma  Neurological and psychiatric diseases: Parkinson disease, tardive dyskinesia, depression, epilepsy, facial nerve palsy, spinal cord injury and congenital disorders such as Down syndrome  Treatment for psoriasis with psoralen and ultraviolet A (PUVA) therapy  Lack of sleep  Stressful events.    In adults, seborrheic dermatitis may typically affect the scalp, face (creases around the nose, behind ears, within eyebrows) and upper trunk. Typical clinical features include:  Winter flares, improving in summer following sun exposure  Minimal itch most of the time  Combination oily and dry mid-facial skin  Ill-defined localized scaly patches or diffuse scale in the scalp  Blepharitis; scaly red eyelid margins  [place]-pink, thin, scaly, and ill-defined plaques in skin folds on both sides of the face  Petal or ring-shaped flaky patches on hair-line and on anterior chest  Rash in armpits, under the breasts, in the groin folds and genital creases  Superficial folliculitis (inflamed hair follicles) on cheeks and upper trunk    Seborrheic dermatitis is diagnosed by its clinical appearance and behavior. Skin biopsy may be helpful but is rarely necessary to make this diagnosis.    3. SEBORRHEIC KERATOSIS; NON-INFLAMED    Physical Exam:  Anatomic Location Affected:  back    Assessment and Plan:  Based on a thorough  "discussion of this condition and the management approach to it (including a comprehensive discussion of the known risks, side effects and potential benefits of treatment), the patient (family) agrees to implement the following specific plan:  Educated that this is benign    Seborrheic Keratosis  A seborrheic keratosis is a harmless warty spot that appears during adult life as a common sign of skin aging.  Seborrheic keratoses can arise on any area of skin, covered or uncovered, with the usual exception of the palms and soles. They do not arise from mucous membranes. Seborrheic keratoses can have highly variable appearance.      Seborrheic keratoses are extremely common. It has been estimated that over 90% of adults over the age of 60 years have one or more of them. They occur in males and females of all races, typically beginning to erupt in the 30s or 40s. They are uncommon under the age of 20 years.  The precise cause of seborrhoeic keratoses is not known.  Seborrhoeic keratoses are considered degenerative in nature. As time goes by, seborrheic keratoses tend to become more numerous. Some people inherit a tendency to develop a very large number of them; some people may have hundreds of them.    The name \"seborrheic keratosis\" is misleading, because these lesions are not limited to a seborrhoeic distribution (scalp, mid-face, chest, upper back), nor are they formed from sebaceous glands, nor are they associated with sebum -- which is greasy.  Seborrheic keratosis may also be called \"SK,\" \"Seb K,\" \"basal cell papilloma,\" \"senile wart,\" or \"barnacle.\"      Researchers have noted:  Eruptive seborrhoeic keratoses can follow sunburn or dermatitis  Skin friction may be the reason they appear in body folds  Viral cause (e.g., human papillomavirus) seems unlikely  Stable and clonal mutations or activation of FRFR3, PIK3CA, BROOKLYNN, AKT1 and EGFR genes are found in seborrhoeic keratoses  Seborrhoeic keratosis can arise from " "solar lentigo  FRFR3 mutations also arise in solar lentigines. These mutations are associated with increased age and location on the head and neck, suggesting a role of ultraviolet radiation in these lesions  Seborrheic keratoses do not harbour tumour suppressor gene mutations  Epidermal growth factor receptor inhibitors, which are used to treat some cancers, often result in an increase in verrucal (warty) keratoses.    There is no easy way to remove multiple lesions on a single occasion.  Unless a specific lesion is \"inflamed\" and is causing pain or stinging/burning or is bleeding, most insurance companies do not authorize treatment.  "

## 2025-05-23 ENCOUNTER — APPOINTMENT (OUTPATIENT)
Dept: PHYSICAL THERAPY | Age: 75
End: 2025-05-23
Attending: PAIN MEDICINE
Payer: MEDICARE

## 2025-05-27 ENCOUNTER — OFFICE VISIT (OUTPATIENT)
Dept: PAIN MEDICINE | Facility: CLINIC | Age: 75
End: 2025-05-27
Payer: MEDICARE

## 2025-05-27 VITALS — BODY MASS INDEX: 28 KG/M2 | WEIGHT: 164 LBS | HEIGHT: 64 IN

## 2025-05-27 DIAGNOSIS — M51.9 THORACIC DISC DISEASE: ICD-10-CM

## 2025-05-27 DIAGNOSIS — M51.9 THORACIC DISC DISEASE: Primary | ICD-10-CM

## 2025-05-27 DIAGNOSIS — M70.62 TROCHANTERIC BURSITIS OF LEFT HIP: ICD-10-CM

## 2025-05-27 PROCEDURE — 99214 OFFICE O/P EST MOD 30 MIN: CPT | Performed by: PAIN MEDICINE

## 2025-05-27 PROCEDURE — 20611 DRAIN/INJ JOINT/BURSA W/US: CPT | Performed by: PAIN MEDICINE

## 2025-05-27 RX ORDER — METHOCARBAMOL 500 MG/1
500 TABLET, FILM COATED ORAL 3 TIMES DAILY
Qty: 90 TABLET | Refills: 1 | Status: SHIPPED | OUTPATIENT
Start: 2025-05-27 | End: 2025-05-30

## 2025-05-27 RX ORDER — METHYLPREDNISOLONE ACETATE 40 MG/ML
1 INJECTION, SUSPENSION INTRA-ARTICULAR; INTRALESIONAL; INTRAMUSCULAR; SOFT TISSUE
Status: COMPLETED | OUTPATIENT
Start: 2025-05-27 | End: 2025-05-27

## 2025-05-27 RX ORDER — BUPIVACAINE HYDROCHLORIDE 2.5 MG/ML
4 INJECTION, SOLUTION INFILTRATION; PERINEURAL
Status: COMPLETED | OUTPATIENT
Start: 2025-05-27 | End: 2025-05-27

## 2025-05-27 RX ADMIN — BUPIVACAINE HYDROCHLORIDE 4 ML: 2.5 INJECTION, SOLUTION INFILTRATION; PERINEURAL at 14:00

## 2025-05-27 RX ADMIN — METHYLPREDNISOLONE ACETATE 1 ML: 40 INJECTION, SUSPENSION INTRA-ARTICULAR; INTRALESIONAL; INTRAMUSCULAR; SOFT TISSUE at 14:00

## 2025-05-27 NOTE — PROGRESS NOTES
"Large joint arthrocentesis: L greater trochanteric bursa    Performed by: Richard Aguilar MD  Authorized by: Richard Aguilar MD    [place] Protocol:  procedure performed by consultantConsent: Verbal consent obtained. Written consent obtained  Risks and benefits: risks, benefits and alternatives were discussed  Consent given by: patient  Time out: Immediately prior to procedure a \"time out\" was called to verify the correct patient, procedure, equipment, support staff and site/side marked as required.  Patient understanding: patient states understanding of the procedure being performed  Patient consent: the patient's understanding of the procedure matches consent given  Procedure consent: procedure consent matches procedure scheduled  Relevant documents: relevant documents present and verified  Test results: test results available and properly labeled  Site marked: the operative site was marked  Radiology Images displayed and confirmed. If images not available, report reviewed: imaging studies available  Required items: required blood products, implants, devices, and special equipment available  Patient identity confirmed: verbally with patient  Supporting Documentation  Indications: pain     Is this a Visco injection? NoProcedure Details  Location: hip - L greater trochanteric bursa  Needle size: 22 G  Ultrasound guidance: yes  Approach: lateral  Medications administered: 4 mL bupivacaine 0.25 %; 1 mL methylPREDNISolone acetate 40 mg/mL    Patient tolerance: patient tolerated the procedure well with no immediate complications          "

## 2025-05-27 NOTE — PROGRESS NOTES
Name: Jaye Jasso      : 1950      MRN: [id number]  Encounter Provider: Richard Aguilar MD  Encounter Date: 2025   Encounter department: [facility]  :  Assessment & Plan  Thoracic disc disease    Orders:  •  diclofenac sodium (VOLTAREN) 50 mg EC tablet; Take 1 tablet (50 mg total) by mouth 2 (two) times a day  •  methocarbamol (ROBAXIN) 500 mg tablet; Take 1 tablet (500 mg total) by mouth 3 (three) times a day  •  FL spine and pain procedure; Future  Jaye has pain in the mid thoracic spine with radiation bilaterally to mid flank , pain increased with flexion and activity, MRI T spine consistent with modic changes at T67, T78   As she has failed conservative therapy   Will recommend diclofenac 50 mg bid, robaxin 500 mg tid  Will recommend T78 interlaminar epidural if symptoms do no improve with oral agents  The risks of the procedure were discussed in detail.  These risks include infection, increased pain, paralysis, bleeding.  Patient understands the risks and is willing to pursue with the procedure    Trochanteric bursitis of left hip  TTP left GTB  Will offer left GTB injection in the office today  Orders:  •  Large joint arthrocentesis: L greater trochanteric bursa  •  bupivacaine (MARCAINE) 0.25 % injection 4 mL  •  methylPREDNISolone acetate (DEPO-MEDROL) injection 1 mL            My impressions and treatment recommendations were discussed in detail with the patient who verbalized understanding and had no further questions.  Discharge instructions were provided. I personally saw and examined the patient and I agree with the above discussed plan of care.    History of Present Illness     Jaye Jasso is a 74 y.o. female who presents for a follow up office visit in regards to mid back pain, increases to 7/10 that increased with activity level, reports radiation laterally below the bra line to mid flank. She has not tried oral agents for her  "pain, but has completed PT with minimal benefit, she recently completed MRI T spine here to review. Also with C/o of left GTB pain.       Opioid contract date    Last UDS Date: No results in last 5 years                    last taken on    Review of Systems   Musculoskeletal:  Positive for arthralgias, joint swelling and myalgias.   All other systems reviewed and are negative.      Medical History Reviewed by provider this encounter:     .       Objective   Ht 5' 4\" (1.626 m)   Wt 74.4 kg (164 lb)   BMI 28.15 kg/m²         Physical Exam    Lumbar Spine:  No masses or atrophy,    Range of motion - Decreased extension/flexion  Palpation -  Tenderness to palpation in the thoracic parapsinals   PSIS tenderness neg  Claudio's/ELODIA neg  Gaenslen's neg  SLR neg       Strength Right Left   Hip flexion L1,2 5 5   Knee extension L3 5 5   Ankle dorsiflexion L4 5 5   Great toe extension L5 5 5   Ankle Plantarflexion S1 5 5       Sensory Exam:  intact to light touch bilateral LE       Reflexes:     Right Left   Biceps 2+ 2+   Triceps 2+ 2+   Brachioradialis 2+ 2+   Patellar 2+ 2+   Achilles 2+ 2+   Babinski neg neg        Gait normal    TTP along left GTB    MRI L/T spine       TECHNIQUE:  Multiplanar, multisequence imaging of the thoracic spine was performed. .     IMAGE QUALITY: Diagnostic.     FINDINGS:     ALIGNMENT: Normal alignment of the thoracic spine.  No compression fracture.  No subluxation.  No scoliosis.     MARROW SIGNAL: No suspicious marrow signal abnormality. Modic type I endplate degenerative changes most pronounced at T6-7. Mild type I endplate changes at T4-5 and T7-T8. Multilevel Modic type II endplate degenerative changes. Hemangioma at T12 and   possible small hemangioma at T10 as well.     THORACIC CORD: Normal signal within the thoracic cord.     PARAVERTEBRAL SOFT TISSUES:  Normal.     THORACIC DEGENERATIVE CHANGE: Tiny central central protrusion at T5-T6 that mildly indents the thecal sac. Tiny disc " bulges at T5-6 and T7-T8 that minimally indents the thecal sac. Left foraminal stenosis at T10-11 due to facet arthropathy.     OTHER FINDINGS: Small perineural cysts at T8-9 and on the left at T11-T12.  Small probable parapelvic renal cysts.     IMPRESSION:     Mild degenerative changes most pronounced at T6-7.     PARASPINAL SOFT TISSUES:  Paraspinal soft tissues are unremarkable.     LOWER THORACIC DISC SPACES: Small disc bulges. No significant canal stenosis. Left foraminal stenosis at T10-11 due to facet arthropathy. Left perineural cyst at T11-T12.     LUMBAR DISC SPACES:     L1-L2: No disc herniation, canal or foraminal stenosis.     L2-L3: No disc herniation, canal or foraminal stenosis. Mild facet arthropathy     L3-L4: Small disc bulge and facet arthropathy. Mild canal and mild foraminal stenosis.     L4-L5: Minimal anterolisthesis. Annular fissure. Facet arthropathy. Mild canal and mild left foraminal stenosis. No significant right foraminal stenosis.     L5-S1: No disc herniation. Annular fissure. Facet arthropathy. No significant canal stenosis. Left lateral recess stenosis. No significant foraminal stenosis.     OTHER FINDINGS: Small bilateral parapelvic renal cysts. Hepatic cysts are stable compared with abdominal CT dated 6/28/2024.

## 2025-05-27 NOTE — ASSESSMENT & PLAN NOTE
TTP left GTB  Will offer left GTB injection in the office today  Orders:  •  Large joint arthrocentesis: L greater trochanteric bursa  •  bupivacaine (MARCAINE) 0.25 % injection 4 mL  •  methylPREDNISolone acetate (DEPO-MEDROL) injection 1 mL

## 2025-05-28 DIAGNOSIS — M51.9 THORACIC DISC DISEASE: ICD-10-CM

## 2025-05-28 RX ORDER — BACLOFEN 10 MG/1
TABLET ORAL
Refills: 0 | OUTPATIENT
Start: 2025-05-28

## 2025-05-29 ENCOUNTER — OFFICE VISIT (OUTPATIENT)
Dept: PHYSICAL THERAPY | Age: 75
End: 2025-05-29
Attending: PAIN MEDICINE
Payer: MEDICARE

## 2025-05-29 DIAGNOSIS — M70.62 TROCHANTERIC BURSITIS OF BOTH HIPS: Primary | ICD-10-CM

## 2025-05-29 DIAGNOSIS — M70.61 TROCHANTERIC BURSITIS OF BOTH HIPS: Primary | ICD-10-CM

## 2025-05-29 PROCEDURE — 97140 MANUAL THERAPY 1/> REGIONS: CPT

## 2025-05-29 PROCEDURE — 97110 THERAPEUTIC EXERCISES: CPT

## 2025-05-29 NOTE — PROGRESS NOTES
"Daily Note     Today's date: 2025  Patient name: Jaye Jasso  : 1950  MRN: [id number]  Referring provider: Richard Aguilar  Dx:   Encounter Diagnosis     ICD-10-CM    1. Trochanteric bursitis of both hips  M70.61     M70.62                      Subjective: Pt noted being upset today with MRI results \"the doctor wants me to have injections\"       Objective: See treatment diary below      Assessment: Decreased thoracic and low back pain after manual work and modalities. Progress as able       Plan: Cont with Plan of care              Precautions: Patient's PMHx is remarkable for HTN, Asthma and Osteoporosis.      Manuals  5/15 5/21 5/29  4/23  4/25 4/28 5/1 5/8   IASTM to bilateral lateral hip and ITB in side lying      And STM to thoracic spine 10 min  10 min 10 min   10 min  10 min  X 10 min  X 10 -GF 10 min  along with STM and pa mobilization to thoracic spine in prone                                       Neuro Re-Ed                                                Ther Ex                        Nu step  10 min  10 min 10 min   10 min  10 min  X 10 min  X 10 min L 1 10 MIN    Standing lumbar spine extension against counter 30x  3 x 10 30x  30x 20x  X 20  2 x 10  20X    Standing ITB stretch:B: 20sec 5x  20 sec x 5 20sec 5x   20sec 5x  20 sec5x  20 sec x 5 20 sec x 5  20SEC 5X    Standing hip flexor stretch:B:  20sec 5x  20 sec x 5 20sec 5x   NT 20SEC 5X  20 sec x 5  20 sec x 5  20SEC 5X                LAQ:B: 20x  NT 20x 3#   20X 3#  NT   NT   Hip flexion seated:B: 20x  NT 20x 3#   20X 3#  NT   NT   Seated thoracic ext with half roll  20x 2x  NT NT  NT NT   NT   Hamstring stretch:B  20sec 5x  20 sec x 5 20SEC 5X   20SEC 5X  20SEC 5X  20 sec x 5  20 sec x 5  20SEC 5X    LTR:B: 20x 2sec  10 sec x 5 20X 3SEC   20X 3SEC  20X 3SEC  3 sec x 20  3 sec x 20  20SEC 5X    Piriformis stretch:B: 20sec 5x  20 sec x 5 20SEC 5X   20SEC 5X  20SEC 5X  20 sec x 5  20 sec x 5  20SEC 5X    DLS abdominal " bracing in hook lying  20sec 5x  NT NT  NT 20X 3SEC  20 x 3 sec  20  x 3 sec  20SEC 5X    DLS abdominal bracing in hook lying with hip flexion:B: 20x  2 x 10 20X   20X 3#  20X 3#  x 20  3 x 10  20x 3#    DLS abdominal bracing in hook lying with slr flexion:B:  20x  NT 20X 3#   20X 3#  20X 3SEC  20 x 3 sec  20 x 3 sec  20x 3#    bridges 20x  2 x 10 20X  20X 3SEC  20x 2sec  20 x 2 sec x  20 x 2 sec  20x 3sec    Side lying hip abduction:B: NT  NT NT  20X 3#  NT   NT   Prone lying NT 2 min 3 MIN  NT NT   NT   Prone press ups 20X  2 x 10 20X  20X 20X  X 20  2 x 10  20X    Prone on elbows   3 MIN  NT NT  2 MIN  3 MIN  X 3 min  X 3 min  3 MIN    DLS Alt UE & LE    20x 2#          Mini squats 20X NT 20x   20X 20X X 20  2 x 10  20X    Lunges on foam airex 20X  NT NT  20X 20X  X 20  2 x 10  20X    Tband rows and extension:B NT NT Dark green   20x   NT NT   NT   Tband Paloff Press NT  NT NT  NT NT   NT   SLRX 3 20X  NT 20X 3#   20X 3#  20X 3#  3# x 20  3# 2 x 10  20X 3#                                        Ther Activity                                    Gait Training                                    Modalities                        MHP to bilateral lateral hips in hook lying or seatd 10 MIN   NT 10 MIN     MHP  10 MIN   PRONE  10 MIN  X 10 min Prone  X 10 min prone  10 MIN     LB  /thoracic/ B ut                              No abnormal movements

## 2025-05-31 DIAGNOSIS — I10 ESSENTIAL HYPERTENSION: ICD-10-CM

## 2025-06-02 RX ORDER — METOPROLOL SUCCINATE 25 MG/1
25 TABLET, EXTENDED RELEASE ORAL DAILY
Qty: 90 TABLET | Refills: 0 | Status: SHIPPED | OUTPATIENT
Start: 2025-06-02

## 2025-06-03 ENCOUNTER — OFFICE VISIT (OUTPATIENT)
Dept: PHYSICAL THERAPY | Age: 75
End: 2025-06-03
Attending: PAIN MEDICINE
Payer: MEDICARE

## 2025-06-03 DIAGNOSIS — M70.61 TROCHANTERIC BURSITIS OF BOTH HIPS: Primary | ICD-10-CM

## 2025-06-03 DIAGNOSIS — M70.62 TROCHANTERIC BURSITIS OF BOTH HIPS: Primary | ICD-10-CM

## 2025-06-03 PROCEDURE — 97140 MANUAL THERAPY 1/> REGIONS: CPT

## 2025-06-03 PROCEDURE — 97110 THERAPEUTIC EXERCISES: CPT

## 2025-06-03 NOTE — PROGRESS NOTES
Daily Note     Today's date: 6/3/2025  Patient name: Jaye Jasso  : 1950  MRN: [id number]  Referring provider: Richard Aguilar  Dx:   Encounter Diagnosis     ICD-10-CM    1. Trochanteric bursitis of both hips  M70.61     M70.62                        Subjective: Pt noted feeling better today.       Objective: See treatment diary below 1:1 Coy DUEÑAS DPT       Assessment: Pain with palpation at thoracic spine decreased with manual work and modalities. Progress as able       Plan: Cont with Plan of care              Precautions: Patient's PMHx is remarkable for HTN, Asthma and Osteoporosis.      Manuals  5/15 5/21 5/29  4/23  4/25 4/28 5/1 5/8   IASTM to bilateral lateral hip and ITB in side lying      And STM to thoracic spine 10 min  10 min 10 min   10 min  10 min  X 10 min  X 10 -GF 10 min  along with STM and pa mobilization to thoracic spine in prone                                       Neuro Re-Ed                                                Ther Ex                        Nu step  10 min  10 min 10 min   10 min  10 min  X 10 min  X 10 min L 1 10 MIN    Standing lumbar spine extension against counter 30x  3 x 10 30x  30x 20x  X 20  2 x 10  20X    Standing ITB stretch:B: 20sec 5x  20 sec x 5 20sec 5x   20sec 5x  20 sec5x  20 sec x 5 20 sec x 5  20SEC 5X    Standing hip flexor stretch:B:  20sec 5x  20 sec x 5 20sec 5x   NT 20SEC 5X  20 sec x 5  20 sec x 5  20SEC 5X                LAQ:B: 20x  NT 20x 3#   20X 3#  NT   NT   Hip flexion seated:B: 20x  NT 20x 3#   20X 3#  NT   NT   Seated thoracic ext with half roll  20x 2x  NT NT  NT NT   NT   Hamstring stretch:B  20sec 5x  20 sec x 5 20SEC 5X   20SEC 5X  20SEC 5X  20 sec x 5  20 sec x 5  20SEC 5X    LTR:B: 20x 2sec  10 sec x 5 20X 3SEC   20X 3SEC  20X 3SEC  3 sec x 20  3 sec x 20  20SEC 5X    Piriformis stretch:B: 20sec 5x  20 sec x 5 20SEC 5X  20sec 5x  20SEC 5X  20SEC 5X  20 sec x 5  20 sec x 5  20SEC 5X    DLS abdominal bracing in hook  lying  20sec 5x  NT NT  NT 20X 3SEC  20 x 3 sec  20  x 3 sec  20SEC 5X    DLS abdominal bracing in hook lying with hip flexion:B: 20x  2 x 10 20X   20X 3#  20X 3#  x 20  3 x 10  20x 3#    DLS abdominal bracing in hook lying with slr flexion:B:  20x  NT 20X 3#  20x 3# 20X 3#  20X 3SEC  20 x 3 sec  20 x 3 sec  20x 3#    bridges 20x  2 x 10 20X 20x 20X 3SEC  20x 2sec  20 x 2 sec x  20 x 2 sec  20x 3sec    Side lying hip abduction:B: NT  NT NT  20X 3#  NT   NT   Prone lying NT 2 min 3 MIN  NT NT   NT   Prone press ups 20X  2 x 10 20X  20X 20X  X 20  2 x 10  20X    Prone on elbows   3 MIN  NT NT  2 MIN  3 MIN  X 3 min  X 3 min  3 MIN    DLS Alt UE & LE    20x 2#          Mini squats 20X NT 20x   20X 20X X 20  2 x 10  20X    Lunges on foam airex 20X  NT NT  20X 20X  X 20  2 x 10  20X    Tband rows and extension:B NT NT Dark green   20x   NT NT   NT   Tband Paloff Press NT  NT NT  NT NT   NT   SLRX 3 20X  NT 20X 3#   20X 3#  20X 3#  3# x 20  3# 2 x 10  20X 3#                                        Ther Activity                                    Gait Training                                    Modalities                        MHP to bilateral lateral hips in hook lying or seatd 10 MIN   NT 10 MIN     MHP  10 MIN   PRONE  10 MIN  X 10 min Prone  X 10 min prone  10 MIN     LB  /thoracic/ B ut

## 2025-06-05 ENCOUNTER — TELEPHONE (OUTPATIENT)
Dept: PAIN MEDICINE | Facility: CLINIC | Age: 75
End: 2025-06-05

## 2025-06-05 NOTE — TELEPHONE ENCOUNTER
Pt lm stating she wants to cancel and hold off on epidural. She will call us when ready. Did call her back and lm to advise I will cancel appt

## 2025-06-06 ENCOUNTER — APPOINTMENT (OUTPATIENT)
Dept: PHYSICAL THERAPY | Age: 75
End: 2025-06-06
Attending: PAIN MEDICINE
Payer: MEDICARE

## 2025-06-09 ENCOUNTER — OFFICE VISIT (OUTPATIENT)
Dept: PHYSICAL THERAPY | Age: 75
End: 2025-06-09
Attending: PAIN MEDICINE
Payer: MEDICARE

## 2025-06-09 DIAGNOSIS — M54.16 LUMBAR RADICULOPATHY: Primary | ICD-10-CM

## 2025-06-09 DIAGNOSIS — M70.61 TROCHANTERIC BURSITIS OF BOTH HIPS: ICD-10-CM

## 2025-06-09 DIAGNOSIS — M70.62 TROCHANTERIC BURSITIS OF BOTH HIPS: ICD-10-CM

## 2025-06-09 PROCEDURE — 97110 THERAPEUTIC EXERCISES: CPT

## 2025-06-09 PROCEDURE — 97140 MANUAL THERAPY 1/> REGIONS: CPT

## 2025-06-09 NOTE — PROGRESS NOTES
"Daily Note     Today's date: 2025  Patient name: Jaye Jasso  : 1950  MRN: [id number]  Referring provider: Richard Aguilar*  Dx:   Encounter Diagnosis     ICD-10-CM    1. Lumbar radiculopathy  M54.16       2. Trochanteric bursitis of both hips  M70.61     M70.62                          Subjective: Pt noted \"I am feeling better\"       Objective: See treatment diary below        Assessment: Decreased thoracic/ lumbar pain as well as increased ROM. Progress as able        Plan: Cont with Plan of care              Precautions: Patient's PMHx is remarkable for HTN, Asthma and Osteoporosis.      Manuals  5/15 5/21 5/29 6/9 4/23  4/25 4/28 5/1 5/8   IASTM to bilateral lateral hip and ITB in side lying      And STM to thoracic spine 10 min  10 min 10 min  10 min  10 min  10 min  X 10 min  X 10 -GF 10 min  along with STM and pa mobilization to thoracic spine in prone                                       Neuro Re-Ed                                                Ther Ex                        Nu step  10 min  10 min 10 min  10 min  10 min  10 min  X 10 min  X 10 min L 1 10 MIN    Standing lumbar spine extension against counter 30x  3 x 10 30x 30x  30x 20x  X 20  2 x 10  20X    Standing ITB stretch:B: 20sec 5x  20 sec x 5 20sec 5x  20sec 5x  20sec 5x  20 sec5x  20 sec x 5 20 sec x 5  20SEC 5X    Standing hip flexor stretch:B:  20sec 5x  20 sec x 5 20sec 5x  20sec 5x  NT 20SEC 5X  20 sec x 5  20 sec x 5  20SEC 5X                LAQ:B: 20x  NT 20x 3#  20x 3#  20X 3#  NT   NT   Hip flexion seated:B: 20x  NT 20x 3#  20x 3#  20X 3#  NT   NT   Seated thoracic ext with half roll  20x 2x  NT NT NT NT NT   NT   Hamstring stretch:B  20sec 5x  20 sec x 5 20SEC 5X  20SEC 5X  20SEC 5X  20SEC 5X  20 sec x 5  20 sec x 5  20SEC 5X    LTR:B: 20x 2sec  10 sec x 5 20X 3SEC  20X 3SEC  20X 3SEC  20X 3SEC  3 sec x 20  3 sec x 20  20SEC 5X    Piriformis stretch:B: 20sec 5x  20 sec x 5 20SEC 5X  20sec 5x  20SEC 5X  " 20SEC 5X  20 sec x 5  20 sec x 5  20SEC 5X    DLS abdominal bracing in hook lying  20sec 5x  NT NT 20X  NT 20X 3SEC  20 x 3 sec  20  x 3 sec  20SEC 5X    DLS abdominal bracing in hook lying with hip flexion:B: 20x  2 x 10 20X  20X  20X 3#  20X 3#  x 20  3 x 10  20x 3#    DLS abdominal bracing in hook lying with slr flexion:B:  20x  NT 20X 3#  20x 3# 20X 3#  20X 3SEC  20 x 3 sec  20 x 3 sec  20x 3#    bridges 20x  2 x 10 20X 20x 20X 3SEC  20x 2sec  20 x 2 sec x  20 x 2 sec  20x 3sec    Side lying hip abduction:B: NT  NT NT 20X 3#  20X 3#  NT   NT   Prone lying NT 2 min 3 MIN 3 MIN  NT NT   NT   Prone press ups 20X  2 x 10 20X 20X 20X 20X  X 20  2 x 10  20X    Prone on elbows   3 MIN  NT NT NT 2 MIN  3 MIN  X 3 min  X 3 min  3 MIN    DLS Alt UE & LE    20x 2#  NT        Mini squats 20X NT 20x  20X  20X 20X X 20  2 x 10  20X    Lunges on foam airex 20X  NT NT NT 20X 20X  X 20  2 x 10  20X    Tband rows and extension:B NT NT Dark green   20x  NT NT NT   NT   Tband Paloff Press NT  NT NT NT NT NT   NT   SLRX 3 20X  NT 20X 3#  20X 3#  20X 3#  20X 3#  3# x 20  3# 2 x 10  20X 3#                                        Ther Activity                                    Gait Training                                    Modalities                        MHP to bilateral lateral hips in hook lying or seatd 10 MIN   NT 10 MIN     MHP 10 MIN  10 MIN   PRONE  10 MIN  X 10 min Prone  X 10 min prone  10 MIN     LB  /thoracic/ B ut

## 2025-06-11 ENCOUNTER — APPOINTMENT (OUTPATIENT)
Dept: PHYSICAL THERAPY | Age: 75
End: 2025-06-11
Attending: PAIN MEDICINE
Payer: MEDICARE

## 2025-06-13 ENCOUNTER — OFFICE VISIT (OUTPATIENT)
Dept: PHYSICAL THERAPY | Age: 75
End: 2025-06-13
Attending: PAIN MEDICINE
Payer: MEDICARE

## 2025-06-13 DIAGNOSIS — M54.16 LUMBAR RADICULOPATHY: Primary | ICD-10-CM

## 2025-06-13 DIAGNOSIS — M70.61 TROCHANTERIC BURSITIS OF BOTH HIPS: ICD-10-CM

## 2025-06-13 DIAGNOSIS — M70.62 TROCHANTERIC BURSITIS OF BOTH HIPS: ICD-10-CM

## 2025-06-13 PROCEDURE — 97140 MANUAL THERAPY 1/> REGIONS: CPT

## 2025-06-13 PROCEDURE — 97110 THERAPEUTIC EXERCISES: CPT

## 2025-06-13 NOTE — PROGRESS NOTES
"Daily Note     Today's date: 2025  Patient name: Jaye Jasso  : 1950  MRN: [id number]  Referring provider: Richard Aguilar  Dx:   Encounter Diagnosis     ICD-10-CM    1. Lumbar radiculopathy  M54.16       2. Trochanteric bursitis of both hips  M70.61     M70.62                            Subjective: Pt noted \"I am having a lot more pain today\"       Objective: See treatment diary below        Assessment: Decreased pain and improved mobility. Held ankle weights due to symptoms today with good tolerance.       Plan: Cont with Plan of care              Precautions: Patient's PMHx is remarkable for HTN, Asthma and Osteoporosis.      Manuals  5/15 5/21 5/29 6/9 6/13        IASTM to bilateral lateral hip and ITB in side lying      And STM to thoracic spine 10 min  10 min 10 min  10 min  10 min                                            Neuro Re-Ed                                                 Ther Ex                        Nu step  10 min  10 min 10 min  10 min  10 min        Standing lumbar spine extension against counter 30x  3 x 10 30x 30x  30x       Standing ITB stretch:B: 20sec 5x  20 sec x 5 20sec 5x  20sec 5x  20sec 5x        Standing hip flexor stretch:B:  20sec 5x  20 sec x 5 20sec 5x  20sec 5x  20sec 3x                     LAQ:B: 20x  NT 20x 3#  20x 3#  NT       Hip flexion seated:B: 20x  NT 20x 3#  20x 3#  NT       Seated thoracic ext with half roll  20x 2x  NT NT NT NT       Hamstring stretch:B  20sec 5x  20 sec x 5 20SEC 5X  20SEC 5X  20SEC 5X        LTR:B: 20x 2sec  10 sec x 5 20X 3SEC  20X 3SEC  20X 3SEC        Piriformis stretch:B: 20sec 5x  20 sec x 5 20SEC 5X  20sec 5x  20SEC 5X        DLS abdominal bracing in hook lying  20sec 5x  NT NT 20X  20X 3SEC        DLS abdominal bracing in hook lying with hip flexion:B: 20x  2 x 10 20X  20X  20X          DLS abdominal bracing in hook lying with slr flexion:B:  20x  NT 20X 3#  20x 3# 20X         bridges 20x  2 x 10 20X 20x 20X " 3SEC        Side lying hip abduction:B: NT  NT NT 20X 3#  NT       Prone lying NT 2 min 3 MIN 3 MIN  3 MIN        Prone press ups 20X  2 x 10 20X 20X 20X       Prone on elbows   3 MIN  NT NT NT 2 MIN   2X       DLS Alt UE & LE    20x 2#  NT NT       Mini squats 20X NT 20x  20X  20X       Lunges on foam airex 20X  NT NT NT 20X       [place] Rows       12# 20x       [place] ext      12# 20x        SLRX 3 20X  NT 20X 3#  20X 3#  NT       Ramesh Palloff      9# 20x                                Ther Activity                                    Gait Training                                    Modalities                        MHP to bilateral lateral hips in hook lying or seatd 10 MIN   NT 10 MIN     MHP 10 MIN  PRONE     LB   MHP     10 MIN

## 2025-06-20 ENCOUNTER — OFFICE VISIT (OUTPATIENT)
Dept: PHYSICAL THERAPY | Age: 75
End: 2025-06-20
Attending: PAIN MEDICINE
Payer: MEDICARE

## 2025-06-20 DIAGNOSIS — M70.62 TROCHANTERIC BURSITIS OF BOTH HIPS: ICD-10-CM

## 2025-06-20 DIAGNOSIS — M54.16 LUMBAR RADICULOPATHY: Primary | ICD-10-CM

## 2025-06-20 DIAGNOSIS — M70.61 TROCHANTERIC BURSITIS OF BOTH HIPS: ICD-10-CM

## 2025-06-20 PROCEDURE — 97140 MANUAL THERAPY 1/> REGIONS: CPT

## 2025-06-20 PROCEDURE — 97110 THERAPEUTIC EXERCISES: CPT

## 2025-06-20 NOTE — PROGRESS NOTES
"Daily Note     Today's date: 2025  Patient name: Jaye Jasso  : 1950  MRN: [id number]  Referring provider: Richard Aguilar  Dx:   Encounter Diagnosis     ICD-10-CM    1. Lumbar radiculopathy  M54.16       2. Trochanteric bursitis of both hips  M70.61     M70.62                   Start Time: 1100  Stop Time: 1200  Total time in clinic (min): 60 minutes    Subjective: Pt noted \"I have been having leg cramps, I have been very busy my upper back gets sore\"       Objective: See treatment diary below        Assessment: Pt tolerated session well. No symptoms by session's end.       Plan: Cont with Plan of care              Precautions: Patient's PMHx is remarkable for HTN, Asthma and Osteoporosis.      Manuals  5/15 5/21 5/29 6/9 6/13  6/20      IASTM to bilateral lateral hip and ITB in side lying      And STM to thoracic spine 10 min  10 min 10 min  10 min  10 min  10 min                                           Neuro Re-Ed                                                 Ther Ex                        Nu step  10 min  10 min 10 min  10 min  10 min  10 min       Standing lumbar spine extension against counter 30x  3 x 10 30x 30x  30x 30x      Standing ITB stretch:B: 20sec 5x  20 sec x 5 20sec 5x  20sec 5x  20sec 5x  20sec 5x       Standing hip flexor stretch:B:  20sec 5x  20 sec x 5 20sec 5x  20sec 5x  20sec 3x   20sec 5x                   LAQ:B: 20x  NT 20x 3#  20x 3#  NT NT      Hip flexion seated:B: 20x  NT 20x 3#  20x 3#  NT NT      Seated thoracic ext with half roll  20x 2x  NT NT NT NT NT      Hamstring stretch:B  20sec 5x  20 sec x 5 20SEC 5X  20SEC 5X  20SEC 5X  20SEC 5X       LTR:B: 20x 2sec  10 sec x 5 20X 3SEC  20X 3SEC  20X 3SEC  20SEC 5X       Piriformis stretch:B: 20sec 5x  20 sec x 5 20SEC 5X  20sec 5x  20SEC 5X  20SEC 5X       DLS abdominal bracing in hook lying  20sec 5x  NT NT 20X  20X 3SEC  20X 3SEC  3#      DLS abdominal bracing in hook lying with hip flexion:B: 20x  2 x " 10 20X  20X  20X    20X 3#       DLS abdominal bracing in hook lying with slr flexion:B:  20x  NT 20X 3#  20x 3# 20X   20X 3#       bridges 20x  2 x 10 20X 20x 20X 3SEC  20X 3SEC       Side lying hip abduction:B: NT  NT NT 20X 3#  NT NT      Prone lying NT 2 min 3 MIN 3 MIN  3 MIN  3 MIN       Prone press ups 20X  2 x 10 20X 20X 20X 20X       Prone on elbows   3 MIN  NT NT NT 2 MIN   2X NT      DLS Alt UE & LE    20x 2#  NT NT NT      Mini squats 20X NT 20x  20X  20X 20X       Lunges on foam airex 20X  NT NT NT 20X NT      Ramesh Rows       12# 20x NT      [place] ext      12# 20x  NT      SLRX 3 20X  NT 20X 3#  20X 3#  NT NT      Ramesh Palloff      9# 20x  NT                              Ther Activity                                    Gait Training                                    Modalities                        MHP to bilateral lateral hips in hook lying or seatd 10 MIN   NT 10 MIN     MHP 10 MIN  PRONE     LB   MHP     10 MIN  PRONE     LB   MHP     10 MIN

## 2025-06-23 ENCOUNTER — OFFICE VISIT (OUTPATIENT)
Dept: PHYSICAL THERAPY | Age: 75
End: 2025-06-23
Attending: PAIN MEDICINE
Payer: MEDICARE

## 2025-06-23 DIAGNOSIS — M54.16 LUMBAR RADICULOPATHY: Primary | ICD-10-CM

## 2025-06-23 DIAGNOSIS — M70.61 TROCHANTERIC BURSITIS OF BOTH HIPS: ICD-10-CM

## 2025-06-23 DIAGNOSIS — M70.62 TROCHANTERIC BURSITIS OF BOTH HIPS: ICD-10-CM

## 2025-06-23 PROCEDURE — 97110 THERAPEUTIC EXERCISES: CPT | Performed by: PHYSICAL THERAPIST

## 2025-06-23 PROCEDURE — 97140 MANUAL THERAPY 1/> REGIONS: CPT | Performed by: PHYSICAL THERAPIST

## 2025-06-23 NOTE — PROGRESS NOTES
Daily Note     Today's date: 2025  Patient name: Jaye Jasso  : 1950  MRN: [id number]  Referring provider: Richard Aguilar*  Dx:   Encounter Diagnosis     ICD-10-CM    1. Lumbar radiculopathy  M54.16       2. Trochanteric bursitis of both hips  M70.61     M70.62           Start Time: 1115  Stop Time: 1215  Total time in clinic (min): 60 minutes    Subjective: Patient reports that sx in mid spine continue to improve but are provoked with rotation to both R and L.       Objective: See treatment diary below      Assessment: Patient tolerated treatment well with no aggravation of sx reported following today's session. In addition, she continues to respond positively to soft tissue mobilization techniques performed to thoracic paraspinals. Continue to address postural strength/mobility, as able.       Plan: Continue per plan of care.      Precautions: Patient's PMHx is remarkable for HTN, Asthma and Osteoporosis.      Manuals  5/15 5/21 5/29 6/9 6/13  6/20 6/23     IASTM to bilateral lateral hip and ITB in side lying      And STM to thoracic spine 10 min  10 min 10 min  10 min  10 min  10 min  STM thoracic paraspinals;      Gentle thoracic/rib PAs       JAB                             Neuro Re-Ed                                                 Ther Ex                        Nu step  10 min  10 min 10 min  10 min  10 min  10 min  10 min     Standing lumbar spine extension against counter 30x  3 x 10 30x 30x  30x 30x 30x     Standing ITB stretch:B: 20sec 5x  20 sec x 5 20sec 5x  20sec 5x  20sec 5x  20sec 5x  20 sec x 5     Standing hip flexor stretch:B:  20sec 5x  20 sec x 5 20sec 5x  20sec 5x  20sec 3x   20sec 5x  20 sec x 5                 LAQ:B: 20x  NT 20x 3#  20x 3#  NT NT      Hip flexion seated:B: 20x  NT 20x 3#  20x 3#  NT NT      Seated thoracic ext with half roll  20x 2x  NT NT NT NT NT      Hamstring stretch:B  20sec 5x  20 sec x 5 20SEC 5X  20SEC 5X  20SEC 5X  20SEC 5X  20 sec x 5      LTR:B: 20x 2sec  10 sec x 5 20X 3SEC  20X 3SEC  20X 3SEC  20SEC 5X  20 sec x 5     Piriformis stretch:B: 20sec 5x  20 sec x 5 20SEC 5X  20sec 5x  20SEC 5X  20SEC 5X  20 sec x 5     DLS abdominal bracing in hook lying  20sec 5x  NT NT 20X  20X 3SEC  20X 3SEC  3#      DLS abdominal bracing in hook lying with hip flexion:B: 20x  2 x 10 20X  20X  20X    20X 3#       DLS abdominal bracing in hook lying with slr flexion:B:  20x  NT 20X 3#  20x 3# 20X   20X 3#       bridges 20x  2 x 10 20X 20x 20X 3SEC  20X 3SEC  20x 3 sec     Side lying hip abduction:B: NT  NT NT 20X 3#  NT NT      Prone lying NT 2 min 3 MIN 3 MIN  3 MIN  3 MIN       Prone press ups 20X  2 x 10 20X 20X 20X 20X       Prone on elbows   3 MIN  NT NT NT 2 MIN   2X NT 2 min x2     DLS Alt UE & LE    20x 2#  NT NT NT      Mini squats 20X NT 20x  20X  20X 20X  20x     Lunges on foam airex 20X  NT NT NT 20X NT      Ramesh Rows       12# 20x NT      [place] ext      12# 20x  NT      SLRX 3 20X  NT 20X 3#  20X 3#  NT NT      [place] Palloff      9# 20x  NT                              Ther Activity                                    Gait Training                                    Modalities                        MHP to bilateral lateral hips in hook lying or seatd 10 MIN   NT 10 MIN     MHP 10 MIN  PRONE     LB   MHP     10 MIN  PRONE     LB   MHP     10 MIN  Prone thoracic MHP

## 2025-06-27 ENCOUNTER — OFFICE VISIT (OUTPATIENT)
Dept: PHYSICAL THERAPY | Age: 75
End: 2025-06-27
Attending: PAIN MEDICINE
Payer: MEDICARE

## 2025-06-27 DIAGNOSIS — M54.16 LUMBAR RADICULOPATHY: ICD-10-CM

## 2025-06-27 DIAGNOSIS — M70.62 TROCHANTERIC BURSITIS OF BOTH HIPS: Primary | ICD-10-CM

## 2025-06-27 DIAGNOSIS — M70.61 TROCHANTERIC BURSITIS OF BOTH HIPS: Primary | ICD-10-CM

## 2025-06-27 PROCEDURE — 97140 MANUAL THERAPY 1/> REGIONS: CPT

## 2025-06-27 PROCEDURE — 97110 THERAPEUTIC EXERCISES: CPT

## 2025-06-27 NOTE — PROGRESS NOTES
"Daily Note     Today's date: 2025  Patient name: Jaye Jasso  : 1950  MRN: [id number]  Referring provider: Richard Aguilar  Dx:   Encounter Diagnosis     ICD-10-CM    1. Trochanteric bursitis of both hips  M70.61     M70.62       2. Lumbar radiculopathy  M54.16                        Subjective: Pt reported thoracic spine \"clicking\"        Objective: See treatment diary below      Assessment: Decreased thoracic pain after manual work and modalities.       Plan: Continue per plan of care.      Precautions: Patient's PMHx is remarkable for HTN, Asthma and Osteoporosis.      Manuals  5/15 5/21 5/29 6/9 6/13  6/20 6/23 6/27    IASTM to bilateral lateral hip and ITB in side lying      And STM to thoracic spine 10 min  10 min 10 min  10 min  10 min  10 min  STM thoracic paraspinals;  10 min     Gentle thoracic/rib PAs       JAB NT                            Neuro Re-Ed                                                 Ther Ex                        Nu step  10 min  10 min 10 min  10 min  10 min  10 min  10 min 10 MIN     Standing lumbar spine extension against counter 30x  3 x 10 30x 30x  30x 30x 30x 30X    Standing ITB stretch:B: 20sec 5x  20 sec x 5 20sec 5x  20sec 5x  20sec 5x  20sec 5x  20 sec x 5 NT    Standing hip flexor stretch:B:  20sec 5x  20 sec x 5 20sec 5x  20sec 5x  20sec 3x   20sec 5x  20 sec x 5 20SEC 5X                 LAQ:B: 20x  NT 20x 3#  20x 3#  NT NT  20X 4#     Hip flexion seated:B: 20x  NT 20x 3#  20x 3#  NT NT  20X 4#     Seated thoracic ext with half roll  20x 2x  NT NT NT NT NT  NT    Hamstring stretch:B  20sec 5x  20 sec x 5 20SEC 5X  20SEC 5X  20SEC 5X  20SEC 5X  20 sec x 5 20SEC 5X     LTR:B: 20x 2sec  10 sec x 5 20X 3SEC  20X 3SEC  20X 3SEC  20SEC 5X  20 sec x 5 20X 3SEC     Piriformis stretch:B: 20sec 5x  20 sec x 5 20SEC 5X  20sec 5x  20SEC 5X  20SEC 5X  20 sec x 5 20SEC 5X     DLS abdominal bracing in hook lying  20sec 5x  NT NT 20X  20X 3SEC  20X 3SEC  3#  NT  "   DLS abdominal bracing in hook lying with hip flexion:B: 20x  2 x 10 20X  20X  20X    20X 3#   20X 4#     DLS abdominal bracing in hook lying with slr flexion:B:  20x  NT 20X 3#  20x 3# 20X   20X 3#   20X 4#    bridges 20x  2 x 10 20X 20x 20X 3SEC  20X 3SEC  20x 3 sec 20X 3SEC     Side lying hip abduction:B: NT  NT NT 20X 3#  NT NT  NT    Prone lying NT 2 min 3 MIN 3 MIN  3 MIN  3 MIN   3 MIN     Prone press ups 20X  2 x 10 20X 20X 20X 20X   30X     Prone on elbows   3 MIN  NT NT NT 2 MIN   2X NT 2 min x2 2 MIN 2X     DLS Alt UE & LE    20x 2#  NT NT NT  NT    Mini squats 20X NT 20x  20X  20X 20X  20x 20X     Lunges on foam airex 20X  NT NT NT 20X NT  NT    [place] Rows       12# 20x NT  NT    [place] ext      12# 20x  NT  NT    SLRX 3 20X  NT 20X 3#  20X 3#  NT NT  20X 4#     [place] Palloff      9# 20x  NT  NT                            Ther Activity                                    Gait Training                                    Modalities                        MHP to bilateral lateral hips in hook lying or seatd 10 MIN   NT 10 MIN     MHP 10 MIN  PRONE     LB   MHP     10 MIN  PRONE     LB   MHP     10 MIN  Prone thoracic MHP PRONE     LB/ thoracic  MHP     10 MIN

## 2025-06-30 ENCOUNTER — OFFICE VISIT (OUTPATIENT)
Dept: PHYSICAL THERAPY | Age: 75
End: 2025-06-30
Attending: PAIN MEDICINE
Payer: MEDICARE

## 2025-06-30 DIAGNOSIS — M70.61 TROCHANTERIC BURSITIS OF BOTH HIPS: Primary | ICD-10-CM

## 2025-06-30 DIAGNOSIS — M70.62 TROCHANTERIC BURSITIS OF LEFT HIP: ICD-10-CM

## 2025-06-30 DIAGNOSIS — M54.16 LUMBAR RADICULOPATHY: ICD-10-CM

## 2025-06-30 DIAGNOSIS — M70.62 TROCHANTERIC BURSITIS OF BOTH HIPS: Primary | ICD-10-CM

## 2025-06-30 PROCEDURE — 97110 THERAPEUTIC EXERCISES: CPT | Performed by: PHYSICAL THERAPIST

## 2025-06-30 PROCEDURE — 97140 MANUAL THERAPY 1/> REGIONS: CPT | Performed by: PHYSICAL THERAPIST

## 2025-06-30 NOTE — PROGRESS NOTES
PT Evaluation  / PT Reassessment     Today's date: 2025  Patient name: Jaye Jasso  : 1950  MRN: [id number]  Referring provider: Richard Aguilar*  Dx:   Encounter Diagnosis     ICD-10-CM    1. Trochanteric bursitis of both hips  M70.61     M70.62       2. Lumbar radiculopathy  M54.16                      Assessment  Impairments: abnormal gait, abnormal or restricted ROM, abnormal movement, activity intolerance, impaired physical strength, lacks appropriate home exercise program and pain with function    Assessment details: PT Reassessment: 25.  Patient reported the following progress since onset of PT: decrease in lumbar spine and bilateral hip pain, standing, walking, stair climbing, transfers and self functional progress.  But, she noted the following deficits that still persists: pushing and assisting her spouse in WC and with his iadls, bending, lifting, prolonged standing based functional deficits and prolonged sitting.    PT Reassessment: 25.  Patient reported the following progress since onset of PT:decrease in pain, increase in lumbar spine mobility, increase in bilateral lower extremity mobility and strength and standing based functional activities.  But, she noted the following deficits that still persists:unable to perform side lying in bed, prolonged sitting functional activities like typing, bending, lifting activities, assisting her  with his functional activities.     PT IE: 3/31/25.  Patient reported chronic bilateral hip, lumbar and thoracic spine pain was aggravated with self, house hold and work based activities.  Patient reported she also has to help her  around the house that aggravated her lumbar, thoracic and bilateral hip pain.  Patient noted she has thoracic spine pain that radiated into the anterior ribs.  Patient noted helping her  with his iadls and activities.  Patient noted she has return of bilateral hip bursitis.  Patient  "noted she had x-ray of lumbar spine and thoracic spine that showed arthritic changes.  Patient noted she saw her spine and pain specialist who ordered MRI on lumbar and thoracic spine.  Patient noted she gets lumbar and thoracic spine will get aggravated with bed transfers, prolonged sitting with pain from the lumbar spine to the thoracic spine / scapular region.  Patient noted sleep is deprived.  Patient noted she has bilateral lateral hip pain that radiates into the bilateral lateral knees.  Patient noted walking aggravates her bilateral lateral hip pain with intermittent episodes of left lower extremity buckle sensation without falling.  Patient noted stair climbing is going \"ok\" but she noted she limits the stairs as much as possible.  Patient noted bending activities is pain aggravating.  Patient noted bilateral lower extremities are weak.  Patient noted she has challenges with standing based self, spouse and house hold chores and activities.    Understanding of Dx/Px/POC: excellent     Prognosis: good  Prognosis details: Patient is a 74 y.o. year old female seen for outpatient PT reevaluation with a Trochanteric bursitis of both hips [M70.61, M70.62], lumbar radiculopathy and midline thoracic back pain.  Patient presents with the following progress since onset of PT: decrease in lumbar, thoracic and bilateral hip pain, increase in lumbar spine mobility,  increase in bilateral lower extremity mobility and strength, decrease in TTP, gait improvements and self functional progress.  Patient presents to PT reassessment with the following problems, concerns, deficits and impairments: bilateral hip, lumbar and thoracic spine region pain, decreased lumbar spine range of motion, decreased bilateral lower extremity mobility and strength, gait and stair dysfunctions, + TTP, + muscle guarding, transfer dysfunctions, functional limitations and decreased tolerance to activity.  Patient would benefit from skilled PT services " under the following PT treatment plan to address the above noted deficits: therapeutic exercises and activities to facilitate lumbar spine mobility and bilateral lower extremity mobility and strength, modalities, manual therapy techniques, postural reeducation and strengthening, DLS and abdominal strengthening, traction, gait and stair training, transfer training, balance and proprioception activities, and a hep.  Thank you for the referral.     Goals  Short Term goals 4 - 6 weeks  1.  Patient will be independent HEP. MET.  2.  Patient will report a 25 - 50% decrease in pain complaints.  MET.  3.  Increase strength 1/2 grade.  MET.  4.  Increase ROM 5-10 degrees.  MET.    Long Term goals 8 - 12 weeks  1.  Patient will report elimination of pain complaints.  Partially MET.  2.  Patient will return to all work related activities without restriction.Partially MET.  3.  Patient will return to all recreational activities without restriction.Partially MET.  4.  ROM WFL.Partially MET.  5.  Strength 5/5.Partially MET.  6.  Patient will report ability to resume independent self dressing activities without bilateral hip, lumbar or thoracic spine region pain aggravation or limitations.Partially MET.  7.  Patient will report ability to resume standing house hold chores and activities without bilateral hip, lumbar or thoracic spine region pain aggravation or limitations.Partially MET.  8.  Patient will report ability to resume all transfers without bilateral hip, lumbar or thoracic spine region pain aggravation or limitations.Partially MET.  9.  Patient will report ability to resume all ambulation based self functional activities without bilateral hip, lumbar or thoracic spine region pain aggravation or limitations.Partially MET.  10.  Patient will report ability to perform house hold stairs activities without bilateral hip, lumbar or thoracic spine region pain aggravation or limitations.Partially MET.  11.  Patient will report  ability to resume chores and activities that involve bending, squatting and lifting without bilateral hip, lumbar or thoracic spine region pain aggravation or limitations.Partially MET.  12.  Patient will report ability to resume standing based functional activities without bilateral hip, lumbar or thoracic spine region pain aggravation or limitations.Partially MET.    Plan  Patient would benefit from: skilled physical therapy and PT eval  Planned modality interventions: low level laser therapy, manual electrical stimulation, TENS, thermotherapy: hydrocollator packs, ultrasound, unattended electrical stimulation, cryotherapy and electrical stimulation/Russian stimulation    Planned therapy interventions: IASTM, joint mobilization, kinesiology taping, manual therapy, massage, balance, balance/weight bearing training, neuromuscular re-education, postural training, body mechanics training, self care, compression, strengthening, stretching, therapeutic activities, therapeutic exercise, therapeutic training, transfer training, flexibility, functional ROM exercises, gait training, graded activity, graded exercise, graded motor, home exercise program, IADL retraining and aquatic therapy    Frequency: 1-2x week  Duration in weeks: 12  Treatment plan discussed with: patient    Subjective Evaluation    History of Present Illness  Mechanism of injury: Patient's PMHx is remarkable for HTN, Asthma and Osteoporosis.      LUMBAR SPINE     INDICATION:   Radiculopathy, lumbar region.      COMPARISON:  None.     VIEWS:  XR SPINE LUMBAR MINIMUM 4 VIEWS NON INJURY  Images: 5     FINDINGS:     There are 5 non rib bearing lumbar vertebral bodies.      There is no evidence of acute fracture or destructive osseous lesion.     Trace anterolisthesis L4-5.     L4-5 and L5-S1 disc height loss with advanced lower lumbar facet arthropathy with multilevel vertebral body endplate spurring.     The pedicles appear intact.     There are  atherosclerotic calcifications. Soft tissues are otherwise unremarkable.     IMPRESSION:        No acute osseous abnormality.       Degenerative changes as described.       THORACIC SPINE     INDICATION:   Pain in thoracic spine. Other chronic pain.      COMPARISON:  None.     VIEWS:  XR SPINE THORACIC 3 VW  Images: 3     FINDINGS:     There is no fracture or pathologic bone lesion.     Thoracic vertebral alignment is within normal limits.     Age related degenerative changes are seen.      There is no displacement of the paraspinal line.      The pedicles appear intact.     IMPRESSION:        No acute osseous abnormality.  Degenerative changes as described.      LEFT HIP     INDICATION:   Pain in left hip.      COMPARISON:  None.     VIEWS:  XR HIP/PELV 2-3 VWS LEFT  W PELVIS IF PERFORMED   Images: 3     FINDINGS:     There is no acute fracture or dislocation.     Mild left hip osteoarthritis is seen.     No lytic or blastic osseous lesion.     Soft tissues are unremarkable.     Degenerative changes pubic symphysis and visualized lower lumbar spine.     IMPRESSION:        No acute osseous abnormality.     Degenerative changes as described.      Patient Goals  Patient goals for therapy: decreased pain, increased motion, independence with ADLs/IADLs, return to sport/leisure activities, increased strength and improved balance  Patient goal: Patient's goals of to losen up her tight musculature, to get stronger and resume all activities without bilateral lateral hip, lumbar and thoracic spine pain  Pain  At best pain ratin  Location: lumbar, thoracic spine and bilateral hips      Objective     Tenderness     Additional Tenderness Details  Patient is + TTP at bilateral ITB at moderate to minimal  levels.  Patient is + for moderate to severe muscle guarding at bilateral lumbar spine erector spinae musculature.    Active Range of Motion   Cervical/Thoracic Spine       Thoracic    Extension: 34 degrees        Lumbar    Flexion: 94 degrees  with pain  Extension: 35 degrees   Left lateral flexion: 18 degrees     Right lateral flexion: 25 degrees   Left rotation: 58 degrees   Right rotation: 62 degrees   Left Hip   Flexion: 120 degrees   Abduction: 30 degrees     Right Hip   Flexion: 122 degrees   Abduction: 35 degrees     Additional Active Range of Motion Details  Hamstring mobility on right at 70 and at 74 degrees on left.    Strength/Myotome Testing     Left Hip   Planes of Motion   Flexion: 4  Extension: 4+  Abduction: 4+  Adduction: 5    Right Hip   Planes of Motion   Flexion: 4  Extension: 4+  Abduction: 4+  Adduction: 5    Left Knee   Flexion: 4+  Extension: 4    Right Knee   Flexion: 4+  Extension: 4    Left Ankle/Foot   Dorsiflexion: 4+  Plantar flexion: 5    Right Ankle/Foot   Dorsiflexion: 4+  Plantar flexion: 5    Tests     Lumbar     Left   Positive slump test.   Negative passive SLR.     Right   Negative passive SLR and slump test.     Left Pelvic Girdle/Sacrum   Negative: active SLR test.     Right Pelvic Girdle/Sacrum   Negative: active SLR test.     Left Hip   Positive ELODIA and piriformis.     Right Hip   Positive ELODIA.   Negative piriformis.     Ambulation     Ambulation: Level Surfaces   Ambulation without assistive device: independent    Additional Level Surfaces Ambulation Details  .    Ambulation: Stairs   Ascend stairs: independent  Pattern: reciprocal  Railings: two rails  Descend stairs: independent  Pattern: non-reciprocal  Railings: two rails        Precautions: Patient's PMHx is remarkable for HTN, Asthma and Osteoporosis.      Manuals  5/15 5/21 5/29 6/9 6/13 6/20 6/23 6/27 6/30   IASTM to bilateral lateral hip and ITB in side lying      And STM to thoracic spine 10 min  10 min 10 min  10 min  10 min  10 min  STM thoracic paraspinals;  10 min  10 min   Gentle thoracic/rib PAs       JAB NT                            Neuro Re-Ed                                                 Ther Ex                         Nu step  10 min  10 min 10 min  10 min  10 min  10 min  10 min 10 MIN  10 min   Standing lumbar spine extension against counter 30x  3 x 10 30x 30x  30x 30x 30x 30X 3 x 10   Standing ITB stretch:B: 20sec 5x  20 sec x 5 20sec 5x  20sec 5x  20sec 5x  20sec 5x  20 sec x 5 NT 20 sec x 5   Standing hip flexor stretch:B:  20sec 5x  20 sec x 5 20sec 5x  20sec 5x  20sec 3x   20sec 5x  20 sec x 5 20SEC 5X  20 sec x 5   Standing heel cord stretch:B:         20 sec x 5   LAQ:B: 20x  NT 20x 3#  20x 3#  NT NT  20X 4#  4# x 20   Hip flexion seated:B: 20x  NT 20x 3#  20x 3#  NT NT  20X 4#  NT   Seated thoracic ext with half roll  20x 2x  NT NT NT NT NT  NT NT   Hamstring stretch:B  20sec 5x  20 sec x 5 20SEC 5X  20SEC 5X  20SEC 5X  20SEC 5X  20 sec x 5 20SEC 5X  20 sec x 5   LTR:B: 20x 2sec  10 sec x 5 20X 3SEC  20X 3SEC  20X 3SEC  20SEC 5X  20 sec x 5 20X 3SEC  20 sec x 5   Piriformis stretch:B: 20sec 5x  20 sec x 5 20SEC 5X  20sec 5x  20SEC 5X  20SEC 5X  20 sec x 5 20SEC 5X  20 sec  x 5   DLS abdominal bracing in hook lying  20sec 5x  NT NT 20X  20X 3SEC  20X 3SEC  3#  NT NT   DLS abdominal bracing in hook lying with hip flexion:B: 20x  2 x 10 20X  20X  20X    20X 3#   20X 4#  4# x 20   DLS abdominal bracing in hook lying with slr flexion:B:  20x  NT 20X 3#  20x 3# 20X   20X 3#   20X 4# 4# x 20   bridges 20x  2 x 10 20X 20x 20X 3SEC  20X 3SEC  20x 3 sec 20X 3SEC  3 sec x 20   Side lying hip abduction:B: NT  NT NT 20X 3#  NT NT  NT NT   Prone lying NT 2 min 3 MIN 3 MIN  3 MIN  3 MIN   3 MIN  3 min   Prone press ups 20X  2 x 10 20X 20X 20X 20X   30X  2 x 10   Prone on elbows   3 MIN  NT NT NT 2 MIN   2X NT 2 min x2 2 MIN 2X  NT   DLS Alt UE & LE    20x 2#  NT NT NT  NT NT   Mini squats 20X NT 20x  20X  20X 20X  20x 20X  2 x 10   Lunges on foam airex 20X  NT NT NT 20X NT  NT NT   [place] Rows       12# 20x NT  NT NT   Ramesh ext      12# 20x  NT  NT NT   SLRX 3 20X  NT 20X 3#  20X 3#  NT NT  20X 4#  4# x 20   Ramesh Palloff       9# 20x  NT  NT NT                           Ther Activity                                    Gait Training                                    Modalities                        MHP to bilateral lateral hips in hook lying or seatd 10 MIN   NT 10 MIN     MHP 10 MIN  PRONE     LB   MHP     10 MIN  PRONE     LB   MHP     10 MIN  Prone thoracic MHP PRONE     LB/ thoracic  MHP     10 MIN  PRONE     LB/ thoracic  MHP     10 MIN

## 2025-07-02 ENCOUNTER — APPOINTMENT (OUTPATIENT)
Dept: PHYSICAL THERAPY | Age: 75
End: 2025-07-02
Attending: PAIN MEDICINE
Payer: MEDICARE

## 2025-07-02 ENCOUNTER — OFFICE VISIT (OUTPATIENT)
Dept: PHYSICAL THERAPY | Age: 75
End: 2025-07-02
Attending: PAIN MEDICINE
Payer: MEDICARE

## 2025-07-02 DIAGNOSIS — M54.16 LUMBAR RADICULOPATHY: ICD-10-CM

## 2025-07-02 DIAGNOSIS — M70.61 TROCHANTERIC BURSITIS OF BOTH HIPS: Primary | ICD-10-CM

## 2025-07-02 DIAGNOSIS — M70.62 TROCHANTERIC BURSITIS OF BOTH HIPS: Primary | ICD-10-CM

## 2025-07-02 PROCEDURE — 97110 THERAPEUTIC EXERCISES: CPT

## 2025-07-02 PROCEDURE — 97140 MANUAL THERAPY 1/> REGIONS: CPT

## 2025-07-02 NOTE — PROGRESS NOTES
"Daily Note     Today's date: 2025  Patient name: Jaye Jasso  : 1950  MRN: [id number]  Referring provider: Richard Aguilar*  Dx:   Encounter Diagnosis     ICD-10-CM    1. Trochanteric bursitis of both hips  M70.61     M70.62       2. Lumbar radiculopathy  M54.16                      Subjective: \"I have been having more spasms and clicking on the top of my back going around to the front\"      Objective: See treatment diary below      Assessment: Decresed thoracic pain with manual work and ktape. Progress as able       Plan: Cont with plan of care      Precautions: Patient's PMHx is remarkable for HTN, Asthma and Osteoporosis.      Manuals  5/15 5/21 5/29 6/9 6/13  6/20 6/23 6/27 7/2   IASTM to bilateral lateral hip and ITB in side lying      And STM to thoracic spine 10 min  10 min 10 min  10 min  10 min  10 min  STM thoracic paraspinals;  10 min  10 min    Gentle thoracic/rib PAs       JAB NT NT   Ktape B paraspinals          SEKOU                Neuro Re-Ed                                                 Ther Ex                        Nu step  10 min  10 min 10 min  10 min  10 min  10 min  10 min 10 MIN  10 MIN    Standing lumbar spine extension against counter 30x  3 x 10 30x 30x  30x 30x 30x 30X 30X   Standing ITB stretch:B: 20sec 5x  20 sec x 5 20sec 5x  20sec 5x  20sec 5x  20sec 5x  20 sec x 5 NT NT   Standing hip flexor stretch:B:  20sec 5x  20 sec x 5 20sec 5x  20sec 5x  20sec 3x   20sec 5x  20 sec x 5 20SEC 5X  20SEC 5X                LAQ:B: 20x  NT 20x 3#  20x 3#  NT NT  20X 4#  NT   Hip flexion seated:B: 20x  NT 20x 3#  20x 3#  NT NT  20X 4#  NT   Seated thoracic ext with half roll  20x 2x  NT NT NT NT NT  NT NT   Hamstring stretch:B  20sec 5x  20 sec x 5 20SEC 5X  20SEC 5X  20SEC 5X  20SEC 5X  20 sec x 5 20SEC 5X  20SEC 5X   LTR:B: 20x 2sec  10 sec x 5 20X 3SEC  20X 3SEC  20X 3SEC  20SEC 5X  20 sec x 5 20X 3SEC  NT   Piriformis stretch:B: 20sec 5x  20 sec x 5 20SEC 5X  20sec 5x  " 20SEC 5X  20SEC 5X  20 sec x 5 20SEC 5X  NT   DLS abdominal bracing in hook lying  20sec 5x  NT NT 20X  20X 3SEC  20X 3SEC  3#  NT NT   DLS abdominal bracing in hook lying with hip flexion:B: 20x  2 x 10 20X  20X  20X    20X 3#   20X 4#  NT   DLS abdominal bracing in hook lying with slr flexion:B:  20x  NT 20X 3#  20x 3# 20X   20X 3#   20X 4# NT   bridges 20x  2 x 10 20X 20x 20X 3SEC  20X 3SEC  20x 3 sec 20X 3SEC  20X 3SEC    Side lying hip abduction:B: NT  NT NT 20X 3#  NT NT  NT NT   Prone lying NT 2 min 3 MIN 3 MIN  3 MIN  3 MIN   3 MIN  3 MIN    Prone press ups 20X  2 x 10 20X 20X 20X 20X   30X  30X    Prone on elbows   3 MIN  NT NT NT 2 MIN   2X NT 2 min x2 2 MIN 2X  2 MIN 2X    DLS Alt UE & LE    20x 2#  NT NT NT  NT NT   Mini squats 20X NT 20x  20X  20X 20X  20x 20X  20X    Lunges on foam airex 20X  NT NT NT 20X NT  NT 20X   Ramesh Rows       12# 20x NT  NT 15# 20X   [place] ext      12# 20x  NT  NT 12# 20X   SLRX 3 20X  NT 20X 3#  20X 3#  NT NT  20X 4#  NT   Ramesh Palloff      9# 20x  NT  NT 10# 29X                            Ther Activity                                    Gait Training                                    Modalities                        MHP to bilateral lateral hips in hook lying or seatd 10 MIN   NT 10 MIN     MHP 10 MIN  PRONE     LB   MHP     10 MIN  PRONE     LB   MHP     10 MIN  Prone thoracic MHP PRONE     LB/ thoracic  MHP     10 MIN  DEFERRED

## 2025-07-03 ENCOUNTER — APPOINTMENT (OUTPATIENT)
Dept: PHYSICAL THERAPY | Age: 75
End: 2025-07-03
Attending: PAIN MEDICINE
Payer: MEDICARE

## 2025-07-09 ENCOUNTER — APPOINTMENT (OUTPATIENT)
Dept: PHYSICAL THERAPY | Age: 75
End: 2025-07-09
Attending: PAIN MEDICINE
Payer: MEDICARE

## 2025-07-10 ENCOUNTER — OFFICE VISIT (OUTPATIENT)
Dept: PHYSICAL THERAPY | Age: 75
End: 2025-07-10
Attending: PAIN MEDICINE
Payer: MEDICARE

## 2025-07-10 DIAGNOSIS — M54.16 LUMBAR RADICULOPATHY: ICD-10-CM

## 2025-07-10 DIAGNOSIS — M70.62 TROCHANTERIC BURSITIS OF BOTH HIPS: Primary | ICD-10-CM

## 2025-07-10 DIAGNOSIS — M70.61 TROCHANTERIC BURSITIS OF BOTH HIPS: Primary | ICD-10-CM

## 2025-07-10 DIAGNOSIS — M70.62 TROCHANTERIC BURSITIS OF LEFT HIP: ICD-10-CM

## 2025-07-10 PROCEDURE — 97110 THERAPEUTIC EXERCISES: CPT | Performed by: PHYSICAL THERAPIST

## 2025-07-10 PROCEDURE — 97140 MANUAL THERAPY 1/> REGIONS: CPT | Performed by: PHYSICAL THERAPIST

## 2025-07-10 NOTE — PROGRESS NOTES
Daily Note     Today's date: 7/10/2025  Patient name: Jaye Jasso  : 1950  MRN: [id number]  Referring provider: Richard Aguilar*  Dx:   Encounter Diagnosis     ICD-10-CM    1. Trochanteric bursitis of both hips  M70.61     M70.62       2. Lumbar radiculopathy  M54.16       3. Trochanteric bursitis of left hip  M70.62                      Subjective: Patient reported her  used her left arm to assist during a transfer in which he aggravated her left thoracic  / lumbar spine pain despite right thoracic / lumbar spine pain continues to persist at moderate levels and > left sided pain presentation.  Patient noted assisting her spouse, self bending based functional activities remain limited by pain aggravation.      Objective: See treatment diary below.      Assessment: Patient presents with lumbar spine extension bias towards lumbar spine pain reduction and functional progress.  But, she exhibits flexion based lumbar spine functional activities as pain aggravating, which is limited by core and postural weakness.  Thus, PT is warranted to facilitate long term pain reduction in lumbar and thoracic spine region and functional progress with return to all self and house hold chores and activities without symptom limitations.        Plan: Cont with plan of care      Precautions: Patient's PMHx is remarkable for HTN, Asthma and Osteoporosis.      Manuals 6/9 6/13  6/20 6/23 6/27 7/2 7/10   IASTM to bilateral lateral hip and ITB in side lying      And STM to thoracic spine 10 min  10 min  10 min  STM thoracic paraspinals;  10 min  10 min  10 min   Gentle thoracic/rib PAs    JAB NT NT NT   Ktape B paraspinals       SEKOU  NT             Neuro Re-Ed                                         Ther Ex                    Nu step 10 min  10 min  10 min  10 min 10 MIN  10 MIN  10 min   Standing lumbar spine extension against counter 30x  30x 30x 30x 30X 30X 3 x 10   Standing ITB stretch:B: 20sec 5x  20sec 5x   20sec 5x  20 sec x 5 NT NT 20 sec x 5   Standing hip flexor stretch:B: 20sec 5x  20sec 3x   20sec 5x  20 sec x 5 20SEC 5X  20SEC 5X  20 sec x 5             LAQ:B: 20x 3#  NT NT  20X 4#  NT NT   Hip flexion seated:B: 20x 3#  NT NT  20X 4#  NT NT   Seated thoracic ext with half roll  NT NT NT  NT NT NT   Hamstring stretch:B 20SEC 5X  20SEC 5X  20SEC 5X  20 sec x 5 20SEC 5X  20SEC 5X NT   LTR:B: 20X 3SEC  20X 3SEC  20SEC 5X  20 sec x 5 20X 3SEC  NT NT   Piriformis stretch:B: 20sec 5x  20SEC 5X  20SEC 5X  20 sec x 5 20SEC 5X  NT NT   DLS abdominal bracing in hook lying 20X  20X 3SEC  20X 3SEC  3#  NT NT NT   DLS abdominal bracing in hook lying with hip flexion:B: 20X  20X    20X 3#   20X 4#  NT NT   DLS abdominal bracing in hook lying with slr flexion:B: 20x 3# 20X   20X 3#   20X 4# NT NT   bridges 20x 20X 3SEC  20X 3SEC  20x 3 sec 20X 3SEC  20X 3SEC  2 x 10   Side lying hip abduction:B: 20X 3#  NT NT  NT NT NT   Prone lying 3 MIN  3 MIN  3 MIN   3 MIN  3 MIN  2 min   Prone press ups 20X 20X 20X   30X  30X  2 x 10   Prone on elbows  NT 2 MIN   2X NT 2 min x2 2 MIN 2X  2 MIN 2X  2 min   DLS Alt UE & LE  NT NT NT  NT NT NT   Mini squats 20X  20X 20X  20x 20X  20X  2 x 10   Lunges on foam airex NT 20X NT  NT 20X 2 x 10   Ramesh Rows    12# 20x NT  NT 15# 20X 15# x 20   Ramesh ext   12# 20x  NT  NT 12# 20X 12# x 20   SLRX 3 20X 3#  NT NT  20X 4#  NT 4# x 20   Ramesh Palloff   9# 20x  NT  NT 10# 20 x  10# x 20                       Ther Activity                              Gait Training                              Modalities                    MHP to bilateral lateral hips in hook lying or seatd 10 MIN  PRONE     LB   MHP     10 MIN  PRONE     LB   MHP     10 MIN  Prone thoracic MHP PRONE     LB/ thoracic  MHP     10 MIN  DEFERRED 10 min prone MHP

## 2025-07-11 ENCOUNTER — APPOINTMENT (OUTPATIENT)
Dept: PHYSICAL THERAPY | Age: 75
End: 2025-07-11
Attending: PAIN MEDICINE
Payer: MEDICARE

## 2025-07-14 ENCOUNTER — APPOINTMENT (OUTPATIENT)
Dept: PHYSICAL THERAPY | Age: 75
End: 2025-07-14
Attending: PAIN MEDICINE
Payer: MEDICARE

## 2025-07-16 ENCOUNTER — OFFICE VISIT (OUTPATIENT)
Dept: PHYSICAL THERAPY | Age: 75
End: 2025-07-16
Attending: PAIN MEDICINE
Payer: MEDICARE

## 2025-07-16 DIAGNOSIS — M54.16 LUMBAR RADICULOPATHY: ICD-10-CM

## 2025-07-16 DIAGNOSIS — M70.62 TROCHANTERIC BURSITIS OF LEFT HIP: ICD-10-CM

## 2025-07-16 DIAGNOSIS — M70.62 TROCHANTERIC BURSITIS OF BOTH HIPS: Primary | ICD-10-CM

## 2025-07-16 DIAGNOSIS — M70.61 TROCHANTERIC BURSITIS OF BOTH HIPS: Primary | ICD-10-CM

## 2025-07-16 PROCEDURE — 97140 MANUAL THERAPY 1/> REGIONS: CPT

## 2025-07-16 PROCEDURE — 97110 THERAPEUTIC EXERCISES: CPT

## 2025-07-16 NOTE — PROGRESS NOTES
"Daily Note     Today's date: 2025  Patient name: Jaye Jasso  : 1950  MRN: [id number]  Referring provider: Richard Aguilar*  Dx:   Encounter Diagnosis     ICD-10-CM    1. Trochanteric bursitis of both hips  M70.61     M70.62       2. Lumbar radiculopathy  M54.16       3. Trochanteric bursitis of left hip  M70.62                      Subjective: Pt reports she has been pretty sore over the last few days. Pt reports she has a bulging disk in her thoracic spine.      Objective: See treatment diary below      Assessment: Tolerated treatment well. Avoided aggressive ext-based exercises so as not to exacerbate pt-reported bulging disk in thoracic spine. Pt reports relief with ext, informed her to only do gentle ext exercises at most for now until she could get clarification on whether ext based exercises were safe for her current condition. Pt responded positively to IASTM to ITB region, reports relief afterwards. Patient demonstrated fatigue post treatment, exhibited good technique with therapeutic exercises, and would benefit from continued PT      Plan: Continue per plan of care.      Precautions: Patient's PMHx is remarkable for HTN, Asthma and Osteoporosis.      Manuals 6/9 6/13  6/20 6/23 6/27 7/2 7/10 7/16   IASTM to bilateral lateral hip and ITB in side lying      And STM to thoracic spine 10 min  10 min  10 min  STM thoracic paraspinals;  10 min  10 min  10 min 10 min   Gentle thoracic/rib PAs    JAB NT NT NT    Ktape B paraspinals       SEKOU  NT               Neuro Re-Ed                                             Ther Ex                      Nu step 10 min  10 min  10 min  10 min 10 MIN  10 MIN  10 min 10 min   Standing lumbar spine extension against counter 30x  30x 30x 30x 30X 30X 3 x 10 2x10 GENTLE   Standing ITB stretch:B: 20sec 5x  20sec 5x  20sec 5x  20 sec x 5 NT NT 20 sec x 5 20\" x5   Standing hip flexor stretch:B: 20sec 5x  20sec 3x   20sec 5x  20 sec x 5 20SEC 5X  20SEC 5X  " "20 sec x 5 20\" x5              LAQ:B: 20x 3#  NT NT  20X 4#  NT NT    Hip flexion seated:B: 20x 3#  NT NT  20X 4#  NT NT    Seated thoracic ext with half roll  NT NT NT  NT NT NT    Hamstring stretch:B 20SEC 5X  20SEC 5X  20SEC 5X  20 sec x 5 20SEC 5X  20SEC 5X NT    LTR:B: 20X 3SEC  20X 3SEC  20SEC 5X  20 sec x 5 20X 3SEC  NT NT    Piriformis stretch:B: 20sec 5x  20SEC 5X  20SEC 5X  20 sec x 5 20SEC 5X  NT NT    DLS abdominal bracing in hook lying 20X  20X 3SEC  20X 3SEC  3#  NT NT NT    DLS abdominal bracing in hook lying with hip flexion:B: 20X  20X    20X 3#   20X 4#  NT NT    DLS abdominal bracing in hook lying with slr flexion:B: 20x 3# 20X   20X 3#   20X 4# NT NT    bridges 20x 20X 3SEC  20X 3SEC  20x 3 sec 20X 3SEC  20X 3SEC  2 x 10    Side lying hip abduction:B: 20X 3#  NT NT  NT NT NT    Prone lying 3 MIN  3 MIN  3 MIN   3 MIN  3 MIN  2 min held   Prone press ups 20X 20X 20X   30X  30X  2 x 10 held   Prone on elbows  NT 2 MIN   2X NT 2 min x2 2 MIN 2X  2 MIN 2X  2 min held   DLS Alt UE & LE  NT NT NT  NT NT NT    Mini squats 20X  20X 20X  20x 20X  20X  2 x 10 2x10   Lunges on foam airex NT 20X NT  NT 20X 2 x 10    Concrete Rows    12# 20x NT  NT 15# 20X 15# x 20 15# x20   Concrete ext   12# 20x  NT  NT 12# 20X 12# x 20 12# x20   SLRX 3 20X 3#  NT NT  20X 4#  NT 4# x 20    Ramesh Palloff   9# 20x  NT  NT 10# 20 x  10# x 20 10# x20                         Ther Activity                                 Gait Training                                 Modalities                      MHP to bilateral lateral hips in hook lying or seatd 10 MIN  PRONE     LB   MHP     10 MIN  PRONE     LB   MHP     10 MIN  Prone thoracic MHP PRONE     LB/ thoracic  MHP     10 MIN  DEFERRED 10 min prone MHP nv                                 "

## 2025-07-21 ENCOUNTER — OFFICE VISIT (OUTPATIENT)
Dept: PHYSICAL THERAPY | Age: 75
End: 2025-07-21
Attending: PAIN MEDICINE
Payer: MEDICARE

## 2025-07-21 DIAGNOSIS — M70.61 TROCHANTERIC BURSITIS OF BOTH HIPS: ICD-10-CM

## 2025-07-21 DIAGNOSIS — M54.16 LUMBAR RADICULOPATHY: ICD-10-CM

## 2025-07-21 DIAGNOSIS — M70.62 TROCHANTERIC BURSITIS OF BOTH HIPS: ICD-10-CM

## 2025-07-21 DIAGNOSIS — M70.62 TROCHANTERIC BURSITIS OF LEFT HIP: Primary | ICD-10-CM

## 2025-07-21 PROCEDURE — 97110 THERAPEUTIC EXERCISES: CPT

## 2025-07-21 PROCEDURE — 97140 MANUAL THERAPY 1/> REGIONS: CPT

## 2025-07-21 NOTE — PROGRESS NOTES
"Daily Note     Today's date: 2025  Patient name: Jaye Jasso  : 1950  MRN: [id number]  Referring provider: Richard Aguilar*  Dx:   Encounter Diagnosis     ICD-10-CM    1. Trochanteric bursitis of left hip  M70.62       2. Lumbar radiculopathy  M54.16       3. Trochanteric bursitis of both hips  M70.61     M70.62                        Subjective: Pt reported she had new medication prescribed is helping with upper back pain \"the doctor didn't want me to do the extension. I got my orthotics as well\"       Objective: See treatment diary below      Assessment:  Pain with palpation at thoracic spine today. Modalities and stretches provide relief. Pt stated that lumbar extension are still beneficial with less ROM       Plan: Continue per plan of care.      Precautions: Patient's PMHx is remarkable for HTN, Asthma and Osteoporosis.      Manuals  7/21   6/20 6/23 6/27 7/2 7/10 7/16   IASTM to bilateral lateral hip and ITB in side lying      And STM to thoracic spine 10 min   10 min  STM thoracic paraspinals;  10 min  10 min  10 min 10 min   Gentle thoracic/rib PAs NT   JAB NT NT NT    Ktape B paraspinals  NT     SEKOU  NT               Neuro Re-Ed                                             Ther Ex                       Nu step 10 min   10 min  10 min 10 MIN  10 MIN  10 min 10 min   Standing lumbar spine extension against counter 2x10 GENTLE  30x 30x 30X 30X 3 x 10 2x10 GENTLE   Standing ITB stretch:B: 20 SEC 5X   20sec 5x  20 sec x 5 NT NT 20 sec x 5 20\" x5   Standing hip flexor stretch:B: 20SEC 5X    20sec 5x  20 sec x 5 20SEC 5X  20SEC 5X  20 sec x 5 20\" x5              LAQ:B: NT  NT  20X 4#  NT NT    Hip flexion seated:B: NT  NT  20X 4#  NT NT    Seated thoracic ext with half roll  NT  NT  NT NT NT    Hamstring stretch:B  20SEC 5X   20SEC 5X  20 sec x 5 20SEC 5X  20SEC 5X NT    LTR:B: 20X 3SEC   20SEC 5X  20 sec x 5 20X 3SEC  NT NT    Piriformis stretch:B: 20SEC 5X   20SEC 5X  20 sec x 5 20SEC " 5X  NT NT    DLS abdominal bracing in hook lying 20X 3SEC  20X 3SEC  3#  NT NT NT    DLS abdominal bracing in hook lying with hip flexion:B: 20X 4#    20X 3#   20X 4#  NT NT    DLS abdominal bracing in hook lying with slr flexion:B: 20X 4#   20X 3#   20X 4# NT NT    bridges 20X 3SEC   20X 3SEC  20x 3 sec 20X 3SEC  20X 3SEC  2 x 10    Side lying hip abduction:B: NT  NT  NT NT NT    Prone lying NT  3 MIN   3 MIN  3 MIN  2 min held   Prone press ups 20X  20X   30X  30X  2 x 10 held   Prone on elbows  20X   NT 2 min x2 2 MIN 2X  2 MIN 2X  2 min held   DLS Alt UE & LE  NT  NT  NT NT NT    Mini squats NT  20X  20x 20X  20X  2 x 10 2x10   Lunges on foam airex NT  NT  NT 20X 2 x 10    Ramesh Rows   NT 12# 20x NT  NT 15# 20X 15# x 20 15# x20   [place] ext  NT 12# 20x  NT  NT 12# 20X 12# x 20 12# x20   SLRX 3 20X 3#  NT NT  20X 4#  NT 4# x 20    [place] Palloff  NT 9# 20x  NT  NT 10# 20 x  10# x 20 10# x20                         Ther Activity                                 Gait Training                                 Modalities                      MHP to bilateral lateral hips in hook lying or seatd 10 MIN  PRONE     LB   MHP     10 MIN  PRONE     LB   MHP     10 MIN  Prone thoracic MHP PRONE     LB/ thoracic  MHP     10 MIN  DEFERRED 10 min prone MHP nv

## 2025-07-25 ENCOUNTER — APPOINTMENT (OUTPATIENT)
Dept: PHYSICAL THERAPY | Age: 75
End: 2025-07-25
Attending: PAIN MEDICINE
Payer: MEDICARE

## 2025-07-30 ENCOUNTER — OFFICE VISIT (OUTPATIENT)
Dept: PHYSICAL THERAPY | Age: 75
End: 2025-07-30
Attending: PAIN MEDICINE
Payer: MEDICARE

## 2025-07-30 DIAGNOSIS — M70.62 TROCHANTERIC BURSITIS OF BOTH HIPS: ICD-10-CM

## 2025-07-30 DIAGNOSIS — M54.16 LUMBAR RADICULOPATHY: ICD-10-CM

## 2025-07-30 DIAGNOSIS — M70.61 TROCHANTERIC BURSITIS OF BOTH HIPS: ICD-10-CM

## 2025-07-30 DIAGNOSIS — M70.62 TROCHANTERIC BURSITIS OF LEFT HIP: Primary | ICD-10-CM

## 2025-07-30 PROCEDURE — 97110 THERAPEUTIC EXERCISES: CPT | Performed by: PHYSICAL THERAPIST

## 2025-07-30 PROCEDURE — 97750 PHYSICAL PERFORMANCE TEST: CPT | Performed by: PHYSICAL THERAPIST

## 2025-07-30 PROCEDURE — 97140 MANUAL THERAPY 1/> REGIONS: CPT | Performed by: PHYSICAL THERAPIST

## 2025-08-04 ENCOUNTER — OFFICE VISIT (OUTPATIENT)
Dept: PHYSICAL THERAPY | Age: 75
End: 2025-08-04
Attending: PAIN MEDICINE
Payer: MEDICARE

## 2025-08-04 DIAGNOSIS — M70.62 TROCHANTERIC BURSITIS OF LEFT HIP: ICD-10-CM

## 2025-08-04 DIAGNOSIS — M70.62 TROCHANTERIC BURSITIS OF BOTH HIPS: Primary | ICD-10-CM

## 2025-08-04 DIAGNOSIS — M70.61 TROCHANTERIC BURSITIS OF BOTH HIPS: Primary | ICD-10-CM

## 2025-08-04 DIAGNOSIS — M54.16 LUMBAR RADICULOPATHY: ICD-10-CM

## 2025-08-04 PROCEDURE — 97140 MANUAL THERAPY 1/> REGIONS: CPT

## 2025-08-04 PROCEDURE — 97110 THERAPEUTIC EXERCISES: CPT
